# Patient Record
Sex: FEMALE | Race: OTHER | HISPANIC OR LATINO | Employment: OTHER | URBAN - METROPOLITAN AREA
[De-identification: names, ages, dates, MRNs, and addresses within clinical notes are randomized per-mention and may not be internally consistent; named-entity substitution may affect disease eponyms.]

---

## 2021-01-01 ENCOUNTER — APPOINTMENT (INPATIENT)
Dept: DIALYSIS | Facility: HOSPITAL | Age: 71
DRG: 637 | End: 2021-01-01
Attending: INTERNAL MEDICINE
Payer: MEDICARE

## 2021-01-01 ENCOUNTER — TELEPHONE (OUTPATIENT)
Dept: OTHER | Facility: OTHER | Age: 71
End: 2021-01-01

## 2021-01-01 ENCOUNTER — APPOINTMENT (INPATIENT)
Dept: RADIOLOGY | Facility: HOSPITAL | Age: 71
DRG: 637 | End: 2021-01-01
Payer: MEDICARE

## 2021-01-01 ENCOUNTER — APPOINTMENT (INPATIENT)
Dept: CT IMAGING | Facility: HOSPITAL | Age: 71
DRG: 637 | End: 2021-01-01
Payer: MEDICARE

## 2021-01-01 ENCOUNTER — APPOINTMENT (INPATIENT)
Dept: DIALYSIS | Facility: HOSPITAL | Age: 71
DRG: 637 | End: 2021-01-01
Payer: MEDICARE

## 2021-01-01 ENCOUNTER — TELEPHONE (OUTPATIENT)
Dept: NEUROLOGY | Facility: CLINIC | Age: 71
End: 2021-01-01

## 2021-01-01 ENCOUNTER — NURSING HOME VISIT (OUTPATIENT)
Dept: GERIATRICS | Facility: OTHER | Age: 71
End: 2021-01-01
Payer: MEDICARE

## 2021-01-01 ENCOUNTER — HOSPITAL ENCOUNTER (INPATIENT)
Facility: HOSPITAL | Age: 71
LOS: 18 days | Discharge: NURSING, REHAB OR LONG-TERM CARE | DRG: 637 | End: 2021-12-22
Attending: INTERNAL MEDICINE | Admitting: INTERNAL MEDICINE
Payer: MEDICARE

## 2021-01-01 ENCOUNTER — PREP FOR PROCEDURE (OUTPATIENT)
Dept: INTERVENTIONAL RADIOLOGY/VASCULAR | Facility: CLINIC | Age: 71
End: 2021-01-01

## 2021-01-01 ENCOUNTER — HOSPITAL ENCOUNTER (INPATIENT)
Dept: VASCULAR ULTRASOUND | Facility: HOSPITAL | Age: 71
Discharge: HOME | DRG: 637 | End: 2021-12-08
Payer: MEDICARE

## 2021-01-01 ENCOUNTER — TELEPHONE (OUTPATIENT)
Dept: CT IMAGING | Facility: HOSPITAL | Age: 71
End: 2021-01-01

## 2021-01-01 ENCOUNTER — APPOINTMENT (EMERGENCY)
Dept: CT IMAGING | Facility: HOSPITAL | Age: 71
End: 2021-01-01
Payer: MEDICARE

## 2021-01-01 ENCOUNTER — APPOINTMENT (INPATIENT)
Dept: NEUROLOGY | Facility: HOSPITAL | Age: 71
DRG: 637 | End: 2021-01-01
Payer: MEDICARE

## 2021-01-01 ENCOUNTER — APPOINTMENT (INPATIENT)
Dept: NON INVASIVE DIAGNOSTICS | Facility: HOSPITAL | Age: 71
DRG: 637 | End: 2021-01-01
Payer: MEDICARE

## 2021-01-01 ENCOUNTER — HOSPITAL ENCOUNTER (EMERGENCY)
Facility: HOSPITAL | Age: 71
End: 2021-12-04
Attending: EMERGENCY MEDICINE | Admitting: EMERGENCY MEDICINE
Payer: MEDICARE

## 2021-01-01 ENCOUNTER — APPOINTMENT (INPATIENT)
Dept: RADIOLOGY | Facility: HOSPITAL | Age: 71
DRG: 637 | End: 2021-01-01
Attending: RADIOLOGY
Payer: MEDICARE

## 2021-01-01 VITALS
BODY MASS INDEX: 20.43 KG/M2 | SYSTOLIC BLOOD PRESSURE: 145 MMHG | DIASTOLIC BLOOD PRESSURE: 68 MMHG | OXYGEN SATURATION: 92 % | TEMPERATURE: 98.4 F | WEIGHT: 119 LBS | RESPIRATION RATE: 18 BRPM | HEART RATE: 70 BPM

## 2021-01-01 VITALS
HEART RATE: 78 BPM | DIASTOLIC BLOOD PRESSURE: 58 MMHG | SYSTOLIC BLOOD PRESSURE: 103 MMHG | HEIGHT: 64 IN | RESPIRATION RATE: 18 BRPM | BODY MASS INDEX: 20.32 KG/M2 | TEMPERATURE: 97.8 F | WEIGHT: 119.05 LBS | OXYGEN SATURATION: 97 %

## 2021-01-01 VITALS
WEIGHT: 130.73 LBS | RESPIRATION RATE: 18 BRPM | OXYGEN SATURATION: 99 % | SYSTOLIC BLOOD PRESSURE: 142 MMHG | TEMPERATURE: 97.8 F | HEART RATE: 94 BPM | DIASTOLIC BLOOD PRESSURE: 62 MMHG

## 2021-01-01 DIAGNOSIS — Z99.2 ESRD (END STAGE RENAL DISEASE) ON DIALYSIS (HCC): Primary | ICD-10-CM

## 2021-01-01 DIAGNOSIS — N18.6 ESRD (END STAGE RENAL DISEASE) ON DIALYSIS (HCC): ICD-10-CM

## 2021-01-01 DIAGNOSIS — R93.89 ABNORMAL CT OF THE CHEST: ICD-10-CM

## 2021-01-01 DIAGNOSIS — E11.00 TYPE 2 DIABETES MELLITUS WITH HYPEROSMOLARITY WITHOUT COMA, WITH LONG-TERM CURRENT USE OF INSULIN (HCC): ICD-10-CM

## 2021-01-01 DIAGNOSIS — R13.10 DYSPHAGIA, UNSPECIFIED TYPE: ICD-10-CM

## 2021-01-01 DIAGNOSIS — Z79.4 TYPE 2 DIABETES MELLITUS WITH HYPEROSMOLARITY WITHOUT COMA, WITH LONG-TERM CURRENT USE OF INSULIN (HCC): ICD-10-CM

## 2021-01-01 DIAGNOSIS — R29.90 STROKE-LIKE SYMPTOMS: ICD-10-CM

## 2021-01-01 DIAGNOSIS — I25.10 CAD (CORONARY ARTERY DISEASE): ICD-10-CM

## 2021-01-01 DIAGNOSIS — K21.9 GASTROESOPHAGEAL REFLUX DISEASE WITHOUT ESOPHAGITIS: ICD-10-CM

## 2021-01-01 DIAGNOSIS — R26.2 AMBULATORY DYSFUNCTION: ICD-10-CM

## 2021-01-01 DIAGNOSIS — G93.40 ENCEPHALOPATHY: ICD-10-CM

## 2021-01-01 DIAGNOSIS — R41.82 ALTERED MENTAL STATUS: Primary | ICD-10-CM

## 2021-01-01 DIAGNOSIS — E87.6 HYPOKALEMIA: ICD-10-CM

## 2021-01-01 DIAGNOSIS — N18.6 ESRD (END STAGE RENAL DISEASE) ON DIALYSIS (HCC): Primary | ICD-10-CM

## 2021-01-01 DIAGNOSIS — I25.5 ISCHEMIC CARDIOMYOPATHY: ICD-10-CM

## 2021-01-01 DIAGNOSIS — R13.10 DYSPHAGIA, UNSPECIFIED TYPE: Primary | ICD-10-CM

## 2021-01-01 DIAGNOSIS — I63.9 CEREBELLAR STROKE (HCC): ICD-10-CM

## 2021-01-01 DIAGNOSIS — R77.8 TROPONIN LEVEL ELEVATED: ICD-10-CM

## 2021-01-01 DIAGNOSIS — Z99.2 ESRD ON PERITONEAL DIALYSIS (HCC): Primary | ICD-10-CM

## 2021-01-01 DIAGNOSIS — Z99.2 ESRD (END STAGE RENAL DISEASE) ON DIALYSIS (HCC): ICD-10-CM

## 2021-01-01 DIAGNOSIS — I63.9 CVA (CEREBRAL VASCULAR ACCIDENT) (HCC): ICD-10-CM

## 2021-01-01 DIAGNOSIS — E11.01 HYPEROSMOLAR COMA (HCC): ICD-10-CM

## 2021-01-01 DIAGNOSIS — N18.6 ESRD ON PERITONEAL DIALYSIS (HCC): Primary | ICD-10-CM

## 2021-01-01 DIAGNOSIS — I10 PRIMARY HYPERTENSION: ICD-10-CM

## 2021-01-01 DIAGNOSIS — R77.8 ELEVATED TROPONIN: ICD-10-CM

## 2021-01-01 LAB
2HR DELTA HS TROPONIN: 49 NG/L
2HR DELTA HS TROPONIN: 85 NG/L
4HR DELTA HS TROPONIN: 118 NG/L
4HR DELTA HS TROPONIN: 131 NG/L
ALBUMIN SERPL BCP-MCNC: 1.7 G/DL (ref 3.5–5)
ALBUMIN SERPL BCP-MCNC: 1.8 G/DL (ref 3.5–5)
ALBUMIN SERPL BCP-MCNC: 1.8 G/DL (ref 3.5–5)
ALBUMIN SERPL BCP-MCNC: 2.6 G/DL (ref 3.4–4.8)
ALP SERPL-CCNC: 108 U/L (ref 46–116)
ALP SERPL-CCNC: 64 U/L (ref 35–140)
ALP SERPL-CCNC: 79 U/L (ref 46–116)
ALP SERPL-CCNC: 80 U/L (ref 46–116)
ALT SERPL W P-5'-P-CCNC: 43 U/L (ref 5–54)
ALT SERPL W P-5'-P-CCNC: 48 U/L (ref 12–78)
ALT SERPL W P-5'-P-CCNC: 50 U/L (ref 12–78)
ALT SERPL W P-5'-P-CCNC: 63 U/L (ref 12–78)
AMMONIA PLAS-SCNC: 20 UMOL/L (ref 11–35)
ANION GAP SERPL CALCULATED.3IONS-SCNC: 10 MMOL/L (ref 4–13)
ANION GAP SERPL CALCULATED.3IONS-SCNC: 11 MMOL/L (ref 4–13)
ANION GAP SERPL CALCULATED.3IONS-SCNC: 12 MMOL/L (ref 4–13)
ANION GAP SERPL CALCULATED.3IONS-SCNC: 13 MMOL/L (ref 4–13)
ANION GAP SERPL CALCULATED.3IONS-SCNC: 13 MMOL/L (ref 4–13)
ANION GAP SERPL CALCULATED.3IONS-SCNC: 14 MMOL/L (ref 4–13)
ANION GAP SERPL CALCULATED.3IONS-SCNC: 6 MMOL/L (ref 4–13)
ANION GAP SERPL CALCULATED.3IONS-SCNC: 6 MMOL/L (ref 4–13)
ANION GAP SERPL CALCULATED.3IONS-SCNC: 7 MMOL/L (ref 4–13)
ANION GAP SERPL CALCULATED.3IONS-SCNC: 7 MMOL/L (ref 4–13)
ANION GAP SERPL CALCULATED.3IONS-SCNC: 8 MMOL/L (ref 4–13)
ANION GAP SERPL CALCULATED.3IONS-SCNC: 9 MMOL/L (ref 4–13)
AORTIC ROOT: 2.8 CM
APICAL FOUR CHAMBER EJECTION FRACTION: 41 %
APPEARANCE FLD: CLEAR
APTT PPP: 18 SECONDS (ref 23–37)
APTT PPP: 28 SECONDS (ref 23–37)
ARTERIAL PATENCY WRIST A: YES
ARTERIAL PATENCY WRIST A: YES
ASCENDING AORTA: 3.1 CM
AST SERPL W P-5'-P-CCNC: 40 U/L (ref 5–45)
AST SERPL W P-5'-P-CCNC: 44 U/L (ref 5–45)
AST SERPL W P-5'-P-CCNC: 50 U/L (ref 15–41)
AST SERPL W P-5'-P-CCNC: 66 U/L (ref 5–45)
ATRIAL RATE: 100 BPM
ATRIAL RATE: 107 BPM
ATRIAL RATE: 110 BPM
ATRIAL RATE: 21 BPM
ATRIAL RATE: 83 BPM
ATRIAL RATE: 89 BPM
ATRIAL RATE: 90 BPM
ATRIAL RATE: 91 BPM
ATRIAL RATE: 95 BPM
AV LVOT PEAK GRADIENT: 1 MMHG
AV PEAK GRADIENT: 10 MMHG
AV REGURGITATION PRESSURE HALF TIME: 0.35 MS
BACTERIA SPEC BFLD CULT: NO GROWTH
BASE EX.OXY STD BLDV CALC-SCNC: 78 % (ref 60–80)
BASE EX.OXY STD BLDV CALC-SCNC: 80.4 % (ref 60–80)
BASE EXCESS BLDA CALC-SCNC: 1.4 MMOL/L
BASE EXCESS BLDA CALC-SCNC: 2 MMOL/L (ref -2–3)
BASE EXCESS BLDV CALC-SCNC: -0.5 MMOL/L
BASE EXCESS BLDV CALC-SCNC: 0.6 MMOL/L
BASOPHILS # BLD AUTO: 0.01 THOUSANDS/ΜL (ref 0–0.1)
BASOPHILS # BLD AUTO: 0.01 THOUSANDS/ΜL (ref 0–0.1)
BASOPHILS # BLD AUTO: 0.02 THOUSANDS/ΜL (ref 0–0.1)
BASOPHILS # BLD AUTO: 0.03 THOUSANDS/ΜL (ref 0–0.1)
BASOPHILS # BLD MANUAL: 0 THOUSAND/UL (ref 0–0.1)
BASOPHILS NFR BLD AUTO: 0 % (ref 0–1)
BASOPHILS NFR BLD AUTO: 1 % (ref 0–1)
BASOPHILS NFR MAR MANUAL: 0 % (ref 0–1)
BETA-HYDROXYBUTYRATE: 2.3 MMOL/L
BILIRUB DIRECT SERPL-MCNC: 0.22 MG/DL (ref 0–0.2)
BILIRUB SERPL-MCNC: 0.37 MG/DL (ref 0.2–1)
BILIRUB SERPL-MCNC: 0.41 MG/DL (ref 0.2–1)
BILIRUB SERPL-MCNC: 0.41 MG/DL (ref 0.2–1)
BILIRUB SERPL-MCNC: 0.49 MG/DL (ref 0.3–1.2)
BUN SERPL-MCNC: 17 MG/DL (ref 5–25)
BUN SERPL-MCNC: 18 MG/DL (ref 5–25)
BUN SERPL-MCNC: 24 MG/DL (ref 5–25)
BUN SERPL-MCNC: 27 MG/DL (ref 5–25)
BUN SERPL-MCNC: 28 MG/DL (ref 5–25)
BUN SERPL-MCNC: 28 MG/DL (ref 5–25)
BUN SERPL-MCNC: 29 MG/DL (ref 5–25)
BUN SERPL-MCNC: 30 MG/DL (ref 5–25)
BUN SERPL-MCNC: 30 MG/DL (ref 5–25)
BUN SERPL-MCNC: 32 MG/DL (ref 5–25)
BUN SERPL-MCNC: 35 MG/DL (ref 5–25)
BUN SERPL-MCNC: 36 MG/DL (ref 5–25)
BUN SERPL-MCNC: 36 MG/DL (ref 6–20)
BUN SERPL-MCNC: 38 MG/DL (ref 5–25)
BUN SERPL-MCNC: 38 MG/DL (ref 5–25)
BUN SERPL-MCNC: 38 MG/DL (ref 6–20)
BUN SERPL-MCNC: 38 MG/DL (ref 6–20)
BUN SERPL-MCNC: 39 MG/DL (ref 5–25)
BUN SERPL-MCNC: 40 MG/DL (ref 6–20)
BUN SERPL-MCNC: 42 MG/DL (ref 5–25)
CA-I BLD-SCNC: 0.96 MMOL/L (ref 1.12–1.32)
CA-I BLD-SCNC: 1.02 MMOL/L (ref 1.12–1.32)
CA-I BLD-SCNC: 1.07 MMOL/L (ref 1.12–1.32)
CA-I BLD-SCNC: 1.13 MMOL/L (ref 1.12–1.32)
CA-I BLD-SCNC: 1.14 MMOL/L (ref 1.12–1.32)
CA-I BLD-SCNC: 1.15 MMOL/L (ref 1.12–1.32)
CA-I BLD-SCNC: 1.19 MMOL/L (ref 1.12–1.32)
CA-I BLD-SCNC: 1.19 MMOL/L (ref 1.12–1.32)
CALCIUM ALBUM COR SERPL-MCNC: 10 MG/DL (ref 8.3–10.1)
CALCIUM ALBUM COR SERPL-MCNC: 10.1 MG/DL (ref 8.3–10.1)
CALCIUM ALBUM COR SERPL-MCNC: 10.5 MG/DL (ref 8.3–10.1)
CALCIUM SERPL-MCNC: 7.6 MG/DL (ref 8.3–10.1)
CALCIUM SERPL-MCNC: 7.6 MG/DL (ref 8.3–10.1)
CALCIUM SERPL-MCNC: 8 MG/DL (ref 8.4–10.2)
CALCIUM SERPL-MCNC: 8.1 MG/DL (ref 8.3–10.1)
CALCIUM SERPL-MCNC: 8.3 MG/DL (ref 8.3–10.1)
CALCIUM SERPL-MCNC: 8.4 MG/DL (ref 8.3–10.1)
CALCIUM SERPL-MCNC: 8.4 MG/DL (ref 8.3–10.1)
CALCIUM SERPL-MCNC: 8.5 MG/DL (ref 8.3–10.1)
CALCIUM SERPL-MCNC: 8.6 MG/DL (ref 8.3–10.1)
CALCIUM SERPL-MCNC: 8.7 MG/DL (ref 8.3–10.1)
CALCIUM SERPL-MCNC: 8.7 MG/DL (ref 8.4–10.2)
CALCIUM SERPL-MCNC: 8.8 MG/DL (ref 8.3–10.1)
CALCIUM SERPL-MCNC: 8.8 MG/DL (ref 8.3–10.1)
CALCIUM SERPL-MCNC: 8.9 MG/DL (ref 8.3–10.1)
CALCIUM SERPL-MCNC: 8.9 MG/DL (ref 8.4–10.2)
CALCIUM SERPL-MCNC: 8.9 MG/DL (ref 8.4–10.2)
CALCIUM SERPL-MCNC: 9 MG/DL (ref 8.3–10.1)
CALCIUM SERPL-MCNC: 9 MG/DL (ref 8.3–10.1)
CALCIUM SERPL-MCNC: 9.1 MG/DL (ref 8.3–10.1)
CALCIUM SERPL-MCNC: 9.2 MG/DL (ref 8.3–10.1)
CARDIAC TROPONIN I PNL SERPL HS: 297 NG/L
CARDIAC TROPONIN I PNL SERPL HS: 382 NG/L
CARDIAC TROPONIN I PNL SERPL HS: 428 NG/L
CARDIAC TROPONIN I PNL SERPL HS: 795 NG/L
CARDIAC TROPONIN I PNL SERPL HS: 844 NG/L
CARDIAC TROPONIN I PNL SERPL HS: 913 NG/L
CHLORIDE SERPL-SCNC: 102 MMOL/L (ref 100–108)
CHLORIDE SERPL-SCNC: 102 MMOL/L (ref 100–108)
CHLORIDE SERPL-SCNC: 102 MMOL/L (ref 96–108)
CHLORIDE SERPL-SCNC: 103 MMOL/L (ref 100–108)
CHLORIDE SERPL-SCNC: 104 MMOL/L (ref 100–108)
CHLORIDE SERPL-SCNC: 105 MMOL/L (ref 100–108)
CHLORIDE SERPL-SCNC: 105 MMOL/L (ref 96–108)
CHLORIDE SERPL-SCNC: 105 MMOL/L (ref 96–108)
CHLORIDE SERPL-SCNC: 106 MMOL/L (ref 100–108)
CHLORIDE SERPL-SCNC: 107 MMOL/L (ref 100–108)
CHLORIDE SERPL-SCNC: 109 MMOL/L (ref 100–108)
CHLORIDE SERPL-SCNC: 110 MMOL/L (ref 100–108)
CHLORIDE SERPL-SCNC: 96 MMOL/L (ref 96–108)
CHLORIDE SERPL-SCNC: 99 MMOL/L (ref 100–108)
CHOLEST SERPL-MCNC: 56 MG/DL
CO2 SERPL-SCNC: 20 MMOL/L (ref 21–32)
CO2 SERPL-SCNC: 23 MMOL/L (ref 21–32)
CO2 SERPL-SCNC: 24 MMOL/L (ref 21–32)
CO2 SERPL-SCNC: 24 MMOL/L (ref 22–33)
CO2 SERPL-SCNC: 25 MMOL/L (ref 21–32)
CO2 SERPL-SCNC: 26 MMOL/L (ref 21–32)
CO2 SERPL-SCNC: 26 MMOL/L (ref 22–33)
CO2 SERPL-SCNC: 27 MMOL/L (ref 21–32)
CO2 SERPL-SCNC: 28 MMOL/L (ref 21–32)
CO2 SERPL-SCNC: 28 MMOL/L (ref 22–33)
CO2 SERPL-SCNC: 29 MMOL/L (ref 22–33)
COLOR FLD: COLORLESS
CREAT SERPL-MCNC: 2.03 MG/DL (ref 0.6–1.3)
CREAT SERPL-MCNC: 2.2 MG/DL (ref 0.6–1.3)
CREAT SERPL-MCNC: 2.65 MG/DL (ref 0.6–1.3)
CREAT SERPL-MCNC: 2.87 MG/DL (ref 0.6–1.3)
CREAT SERPL-MCNC: 2.93 MG/DL (ref 0.6–1.3)
CREAT SERPL-MCNC: 3.04 MG/DL (ref 0.6–1.3)
CREAT SERPL-MCNC: 3.23 MG/DL (ref 0.6–1.3)
CREAT SERPL-MCNC: 3.36 MG/DL (ref 0.6–1.3)
CREAT SERPL-MCNC: 3.44 MG/DL (ref 0.6–1.3)
CREAT SERPL-MCNC: 3.49 MG/DL (ref 0.6–1.3)
CREAT SERPL-MCNC: 3.5 MG/DL (ref 0.6–1.3)
CREAT SERPL-MCNC: 3.58 MG/DL (ref 0.6–1.3)
CREAT SERPL-MCNC: 3.61 MG/DL (ref 0.6–1.3)
CREAT SERPL-MCNC: 3.62 MG/DL (ref 0.6–1.3)
CREAT SERPL-MCNC: 3.76 MG/DL (ref 0.6–1.3)
CREAT SERPL-MCNC: 3.79 MG/DL (ref 0.6–1.3)
CREAT SERPL-MCNC: 3.89 MG/DL (ref 0.6–1.3)
CREAT SERPL-MCNC: 3.96 MG/DL (ref 0.6–1.3)
CREAT SERPL-MCNC: 3.98 MG/DL (ref 0.6–1.3)
CREAT SERPL-MCNC: 4.02 MG/DL (ref 0.4–1.1)
CREAT SERPL-MCNC: 4.14 MG/DL (ref 0.4–1.1)
CREAT SERPL-MCNC: 4.18 MG/DL (ref 0.6–1.3)
CREAT SERPL-MCNC: 4.19 MG/DL (ref 0.4–1.1)
CREAT SERPL-MCNC: 4.27 MG/DL (ref 0.6–1.3)
CREAT SERPL-MCNC: 4.36 MG/DL (ref 0.4–1.1)
DOP CALC LVOT AREA: 2.54 CM2
DOP CALC LVOT DIAMETER: 1.8 CM
DOP CALC MV VTI: 24.39 CM
DOP CALC RVOT PEAK VEL: 0.7 M/S
E WAVE DECELERATION TIME: 126 MS
EOSINOPHIL # BLD AUTO: 0.02 THOUSAND/ΜL (ref 0–0.61)
EOSINOPHIL # BLD AUTO: 0.03 THOUSAND/ΜL (ref 0–0.61)
EOSINOPHIL # BLD AUTO: 0.04 THOUSAND/ΜL (ref 0–0.61)
EOSINOPHIL # BLD AUTO: 0.04 THOUSAND/ΜL (ref 0–0.61)
EOSINOPHIL # BLD AUTO: 0.05 THOUSAND/ΜL (ref 0–0.61)
EOSINOPHIL # BLD AUTO: 0.06 THOUSAND/ΜL (ref 0–0.61)
EOSINOPHIL # BLD AUTO: 0.08 THOUSAND/ΜL (ref 0–0.61)
EOSINOPHIL # BLD AUTO: 0.16 THOUSAND/ΜL (ref 0–0.61)
EOSINOPHIL # BLD MANUAL: 0.1 THOUSAND/UL (ref 0–0.4)
EOSINOPHIL NFR BLD AUTO: 0 % (ref 0–6)
EOSINOPHIL NFR BLD AUTO: 1 % (ref 0–6)
EOSINOPHIL NFR BLD AUTO: 3 % (ref 0–6)
EOSINOPHIL NFR BLD MANUAL: 2 % (ref 0–6)
ERYTHROCYTE [DISTWIDTH] IN BLOOD BY AUTOMATED COUNT: 14.5 % (ref 11.6–15.1)
ERYTHROCYTE [DISTWIDTH] IN BLOOD BY AUTOMATED COUNT: 14.6 % (ref 11.6–15.1)
ERYTHROCYTE [DISTWIDTH] IN BLOOD BY AUTOMATED COUNT: 14.9 % (ref 11.6–15.1)
ERYTHROCYTE [DISTWIDTH] IN BLOOD BY AUTOMATED COUNT: 14.9 % (ref 11.6–15.1)
ERYTHROCYTE [DISTWIDTH] IN BLOOD BY AUTOMATED COUNT: 15.2 % (ref 11.6–15.1)
ERYTHROCYTE [DISTWIDTH] IN BLOOD BY AUTOMATED COUNT: 15.3 % (ref 11.6–15.1)
ERYTHROCYTE [DISTWIDTH] IN BLOOD BY AUTOMATED COUNT: 15.5 % (ref 11.6–15.1)
EST. AVERAGE GLUCOSE BLD GHB EST-MCNC: 249 MG/DL
FIO2 GAS DIL.REBREATH: 50 L
FLUAV RNA RESP QL NAA+PROBE: NEGATIVE
FLUBV RNA RESP QL NAA+PROBE: NEGATIVE
FRACTIONAL SHORTENING: 29 % (ref 28–44)
GFR SERPL CREATININE-BSD FRML MDRD: 10 ML/MIN/1.73SQ M
GFR SERPL CREATININE-BSD FRML MDRD: 11 ML/MIN/1.73SQ M
GFR SERPL CREATININE-BSD FRML MDRD: 12 ML/MIN/1.73SQ M
GFR SERPL CREATININE-BSD FRML MDRD: 13 ML/MIN/1.73SQ M
GFR SERPL CREATININE-BSD FRML MDRD: 14 ML/MIN/1.73SQ M
GFR SERPL CREATININE-BSD FRML MDRD: 15 ML/MIN/1.73SQ M
GFR SERPL CREATININE-BSD FRML MDRD: 15 ML/MIN/1.73SQ M
GFR SERPL CREATININE-BSD FRML MDRD: 16 ML/MIN/1.73SQ M
GFR SERPL CREATININE-BSD FRML MDRD: 17 ML/MIN/1.73SQ M
GFR SERPL CREATININE-BSD FRML MDRD: 22 ML/MIN/1.73SQ M
GFR SERPL CREATININE-BSD FRML MDRD: 24 ML/MIN/1.73SQ M
GLUCOSE SERPL-MCNC: 101 MG/DL (ref 65–140)
GLUCOSE SERPL-MCNC: 102 MG/DL (ref 65–140)
GLUCOSE SERPL-MCNC: 103 MG/DL (ref 65–140)
GLUCOSE SERPL-MCNC: 104 MG/DL (ref 65–140)
GLUCOSE SERPL-MCNC: 105 MG/DL (ref 65–140)
GLUCOSE SERPL-MCNC: 105 MG/DL (ref 65–140)
GLUCOSE SERPL-MCNC: 106 MG/DL (ref 65–140)
GLUCOSE SERPL-MCNC: 107 MG/DL (ref 65–140)
GLUCOSE SERPL-MCNC: 111 MG/DL (ref 65–140)
GLUCOSE SERPL-MCNC: 112 MG/DL (ref 65–140)
GLUCOSE SERPL-MCNC: 113 MG/DL (ref 65–140)
GLUCOSE SERPL-MCNC: 113 MG/DL (ref 65–140)
GLUCOSE SERPL-MCNC: 114 MG/DL (ref 65–140)
GLUCOSE SERPL-MCNC: 115 MG/DL (ref 65–140)
GLUCOSE SERPL-MCNC: 116 MG/DL (ref 65–140)
GLUCOSE SERPL-MCNC: 118 MG/DL (ref 65–140)
GLUCOSE SERPL-MCNC: 118 MG/DL (ref 65–140)
GLUCOSE SERPL-MCNC: 121 MG/DL (ref 65–140)
GLUCOSE SERPL-MCNC: 123 MG/DL (ref 65–140)
GLUCOSE SERPL-MCNC: 124 MG/DL (ref 65–140)
GLUCOSE SERPL-MCNC: 124 MG/DL (ref 65–140)
GLUCOSE SERPL-MCNC: 125 MG/DL (ref 65–140)
GLUCOSE SERPL-MCNC: 125 MG/DL (ref 65–140)
GLUCOSE SERPL-MCNC: 126 MG/DL (ref 65–140)
GLUCOSE SERPL-MCNC: 128 MG/DL (ref 65–140)
GLUCOSE SERPL-MCNC: 129 MG/DL (ref 65–140)
GLUCOSE SERPL-MCNC: 132 MG/DL (ref 65–140)
GLUCOSE SERPL-MCNC: 132 MG/DL (ref 65–140)
GLUCOSE SERPL-MCNC: 134 MG/DL (ref 65–140)
GLUCOSE SERPL-MCNC: 134 MG/DL (ref 65–140)
GLUCOSE SERPL-MCNC: 135 MG/DL (ref 65–140)
GLUCOSE SERPL-MCNC: 136 MG/DL (ref 65–140)
GLUCOSE SERPL-MCNC: 137 MG/DL (ref 65–140)
GLUCOSE SERPL-MCNC: 139 MG/DL (ref 65–140)
GLUCOSE SERPL-MCNC: 140 MG/DL (ref 65–140)
GLUCOSE SERPL-MCNC: 142 MG/DL (ref 65–140)
GLUCOSE SERPL-MCNC: 143 MG/DL (ref 65–140)
GLUCOSE SERPL-MCNC: 144 MG/DL (ref 65–140)
GLUCOSE SERPL-MCNC: 144 MG/DL (ref 65–140)
GLUCOSE SERPL-MCNC: 146 MG/DL (ref 65–140)
GLUCOSE SERPL-MCNC: 148 MG/DL (ref 65–140)
GLUCOSE SERPL-MCNC: 152 MG/DL (ref 65–140)
GLUCOSE SERPL-MCNC: 153 MG/DL (ref 65–140)
GLUCOSE SERPL-MCNC: 154 MG/DL (ref 65–140)
GLUCOSE SERPL-MCNC: 156 MG/DL (ref 65–140)
GLUCOSE SERPL-MCNC: 159 MG/DL (ref 65–140)
GLUCOSE SERPL-MCNC: 159 MG/DL (ref 65–140)
GLUCOSE SERPL-MCNC: 160 MG/DL (ref 65–140)
GLUCOSE SERPL-MCNC: 160 MG/DL (ref 65–140)
GLUCOSE SERPL-MCNC: 163 MG/DL (ref 65–140)
GLUCOSE SERPL-MCNC: 164 MG/DL (ref 65–140)
GLUCOSE SERPL-MCNC: 166 MG/DL (ref 65–140)
GLUCOSE SERPL-MCNC: 168 MG/DL (ref 65–140)
GLUCOSE SERPL-MCNC: 169 MG/DL (ref 65–140)
GLUCOSE SERPL-MCNC: 170 MG/DL (ref 65–140)
GLUCOSE SERPL-MCNC: 172 MG/DL (ref 65–140)
GLUCOSE SERPL-MCNC: 177 MG/DL (ref 65–140)
GLUCOSE SERPL-MCNC: 180 MG/DL (ref 65–140)
GLUCOSE SERPL-MCNC: 187 MG/DL (ref 65–140)
GLUCOSE SERPL-MCNC: 188 MG/DL (ref 65–140)
GLUCOSE SERPL-MCNC: 189 MG/DL (ref 65–140)
GLUCOSE SERPL-MCNC: 190 MG/DL (ref 65–140)
GLUCOSE SERPL-MCNC: 194 MG/DL (ref 65–140)
GLUCOSE SERPL-MCNC: 194 MG/DL (ref 65–140)
GLUCOSE SERPL-MCNC: 197 MG/DL (ref 65–140)
GLUCOSE SERPL-MCNC: 202 MG/DL (ref 65–140)
GLUCOSE SERPL-MCNC: 205 MG/DL (ref 65–140)
GLUCOSE SERPL-MCNC: 208 MG/DL (ref 65–140)
GLUCOSE SERPL-MCNC: 214 MG/DL (ref 65–140)
GLUCOSE SERPL-MCNC: 216 MG/DL (ref 65–140)
GLUCOSE SERPL-MCNC: 219 MG/DL (ref 65–140)
GLUCOSE SERPL-MCNC: 224 MG/DL (ref 65–140)
GLUCOSE SERPL-MCNC: 225 MG/DL (ref 65–140)
GLUCOSE SERPL-MCNC: 229 MG/DL (ref 65–140)
GLUCOSE SERPL-MCNC: 231 MG/DL (ref 65–140)
GLUCOSE SERPL-MCNC: 231 MG/DL (ref 65–140)
GLUCOSE SERPL-MCNC: 233 MG/DL (ref 65–140)
GLUCOSE SERPL-MCNC: 235 MG/DL (ref 65–140)
GLUCOSE SERPL-MCNC: 241 MG/DL (ref 65–140)
GLUCOSE SERPL-MCNC: 242 MG/DL (ref 65–140)
GLUCOSE SERPL-MCNC: 249 MG/DL (ref 65–140)
GLUCOSE SERPL-MCNC: 251 MG/DL (ref 65–140)
GLUCOSE SERPL-MCNC: 281 MG/DL (ref 65–140)
GLUCOSE SERPL-MCNC: 293 MG/DL (ref 65–140)
GLUCOSE SERPL-MCNC: 296 MG/DL (ref 65–140)
GLUCOSE SERPL-MCNC: 301 MG/DL (ref 65–140)
GLUCOSE SERPL-MCNC: 330 MG/DL (ref 65–140)
GLUCOSE SERPL-MCNC: 360 MG/DL (ref 65–140)
GLUCOSE SERPL-MCNC: 384 MG/DL (ref 65–140)
GLUCOSE SERPL-MCNC: 398 MG/DL (ref 65–140)
GLUCOSE SERPL-MCNC: 404 MG/DL (ref 65–140)
GLUCOSE SERPL-MCNC: 405 MG/DL (ref 65–140)
GLUCOSE SERPL-MCNC: 406 MG/DL (ref 65–140)
GLUCOSE SERPL-MCNC: 413 MG/DL (ref 65–140)
GLUCOSE SERPL-MCNC: 440 MG/DL (ref 65–140)
GLUCOSE SERPL-MCNC: 454 MG/DL (ref 65–140)
GLUCOSE SERPL-MCNC: 462 MG/DL (ref 65–140)
GLUCOSE SERPL-MCNC: 475 MG/DL (ref 65–140)
GLUCOSE SERPL-MCNC: 498 MG/DL (ref 65–140)
GLUCOSE SERPL-MCNC: 55 MG/DL (ref 65–140)
GLUCOSE SERPL-MCNC: 56 MG/DL (ref 65–140)
GLUCOSE SERPL-MCNC: 57 MG/DL (ref 65–140)
GLUCOSE SERPL-MCNC: 59 MG/DL (ref 65–140)
GLUCOSE SERPL-MCNC: 64 MG/DL (ref 65–140)
GLUCOSE SERPL-MCNC: 670 MG/DL (ref 65–140)
GLUCOSE SERPL-MCNC: 684 MG/DL (ref 65–140)
GLUCOSE SERPL-MCNC: 69 MG/DL (ref 65–140)
GLUCOSE SERPL-MCNC: 700 MG/DL (ref 65–140)
GLUCOSE SERPL-MCNC: 71 MG/DL (ref 65–140)
GLUCOSE SERPL-MCNC: 75 MG/DL (ref 65–140)
GLUCOSE SERPL-MCNC: 76 MG/DL (ref 65–140)
GLUCOSE SERPL-MCNC: 77 MG/DL (ref 65–140)
GLUCOSE SERPL-MCNC: 78 MG/DL (ref 65–140)
GLUCOSE SERPL-MCNC: 81 MG/DL (ref 65–140)
GLUCOSE SERPL-MCNC: 82 MG/DL (ref 65–140)
GLUCOSE SERPL-MCNC: 82 MG/DL (ref 65–140)
GLUCOSE SERPL-MCNC: 83 MG/DL (ref 65–140)
GLUCOSE SERPL-MCNC: 85 MG/DL (ref 65–140)
GLUCOSE SERPL-MCNC: 86 MG/DL (ref 65–140)
GLUCOSE SERPL-MCNC: 86 MG/DL (ref 65–140)
GLUCOSE SERPL-MCNC: 869 MG/DL (ref 65–140)
GLUCOSE SERPL-MCNC: 87 MG/DL (ref 65–140)
GLUCOSE SERPL-MCNC: 88 MG/DL (ref 65–140)
GLUCOSE SERPL-MCNC: 88 MG/DL (ref 65–140)
GLUCOSE SERPL-MCNC: 92 MG/DL (ref 65–140)
GLUCOSE SERPL-MCNC: 99 MG/DL (ref 65–140)
GLUCOSE SERPL-MCNC: 99 MG/DL (ref 65–140)
GLUCOSE SERPL-MCNC: 990 MG/DL (ref 65–140)
GLUCOSE SERPL-MCNC: <20 MG/DL (ref 65–140)
GLUCOSE SERPL-MCNC: <20 MG/DL (ref 65–140)
GLUCOSE SERPL-MCNC: >500 MG/DL (ref 65–140)
GRAM STN SPEC: NORMAL
HBA1C MFR BLD: 10.3 %
HBV CORE AB SER QL: NORMAL
HBV CORE IGM SER QL: NORMAL
HBV SURFACE AB SER-ACNC: 412.89 MIU/ML
HBV SURFACE AG SER QL: NORMAL
HCO3 BLDA-SCNC: 24.5 MMOL/L (ref 22–28)
HCO3 BLDA-SCNC: 26 MMOL/L (ref 22–28)
HCO3 BLDV-SCNC: 24.7 MMOL/L (ref 24–30)
HCO3 BLDV-SCNC: 26.2 MMOL/L (ref 24–30)
HCT VFR BLD AUTO: 30.1 % (ref 34.8–46.1)
HCT VFR BLD AUTO: 30.1 % (ref 34.8–46.1)
HCT VFR BLD AUTO: 30.3 % (ref 34.8–46.1)
HCT VFR BLD AUTO: 34.8 % (ref 34.8–46.1)
HCT VFR BLD AUTO: 35.3 % (ref 34.8–46.1)
HCT VFR BLD AUTO: 35.7 % (ref 34.8–46.1)
HCT VFR BLD AUTO: 37.4 % (ref 34.8–46.1)
HCT VFR BLD AUTO: 41 % (ref 34.8–46.1)
HCT VFR BLD AUTO: 42 % (ref 34.8–46.1)
HCT VFR BLD AUTO: 42.2 % (ref 34.8–46.1)
HCT VFR BLD AUTO: 46 % (ref 34.8–46.1)
HCT VFR BLD CALC: 30 % (ref 34.8–46.1)
HCV AB SER QL: NORMAL
HDLC SERPL-MCNC: 14 MG/DL
HGB BLD-MCNC: 10.4 G/DL (ref 11.5–15.4)
HGB BLD-MCNC: 10.7 G/DL (ref 11.5–15.4)
HGB BLD-MCNC: 10.9 G/DL (ref 11.5–15.4)
HGB BLD-MCNC: 11.8 G/DL (ref 11.5–15.4)
HGB BLD-MCNC: 12.5 G/DL (ref 11.5–15.4)
HGB BLD-MCNC: 12.9 G/DL (ref 11.5–15.4)
HGB BLD-MCNC: 13.5 G/DL (ref 11.5–15.4)
HGB BLD-MCNC: 13.5 G/DL (ref 11.5–15.4)
HGB BLD-MCNC: 9.2 G/DL (ref 11.5–15.4)
HGB BLD-MCNC: 9.4 G/DL (ref 11.5–15.4)
HGB BLD-MCNC: 9.5 G/DL (ref 11.5–15.4)
HGB BLDA-MCNC: 10.2 G/DL (ref 11.5–15.4)
IMM GRANULOCYTES # BLD AUTO: 0.02 THOUSAND/UL (ref 0–0.2)
IMM GRANULOCYTES # BLD AUTO: 0.03 THOUSAND/UL (ref 0–0.2)
IMM GRANULOCYTES # BLD AUTO: 0.04 THOUSAND/UL (ref 0–0.2)
IMM GRANULOCYTES # BLD AUTO: 0.07 THOUSAND/UL (ref 0–0.2)
IMM GRANULOCYTES # BLD AUTO: 0.09 THOUSAND/UL (ref 0–0.2)
IMM GRANULOCYTES # BLD AUTO: 0.09 THOUSAND/UL (ref 0–0.2)
IMM GRANULOCYTES # BLD AUTO: 0.11 THOUSAND/UL (ref 0–0.2)
IMM GRANULOCYTES # BLD AUTO: 0.12 THOUSAND/UL (ref 0–0.2)
IMM GRANULOCYTES NFR BLD AUTO: 0 % (ref 0–2)
IMM GRANULOCYTES NFR BLD AUTO: 0 % (ref 0–2)
IMM GRANULOCYTES NFR BLD AUTO: 1 % (ref 0–2)
IMM GRANULOCYTES NFR BLD AUTO: 2 % (ref 0–2)
IMM GRANULOCYTES NFR BLD AUTO: 2 % (ref 0–2)
INR PPP: 0.93 (ref 0.84–1.19)
INR PPP: 0.99 (ref 0.84–1.19)
INR PPP: 1.11 (ref 0.84–1.19)
INTERVENTRICULAR SEPTUM IN DIASTOLE (PARASTERNAL SHORT AXIS VIEW): 1.1 CM
LACTATE SERPL-SCNC: 1.5 MMOL/L (ref 0–2)
LACTATE SERPL-SCNC: 1.9 MMOL/L (ref 0.5–2)
LACTATE SERPL-SCNC: 2.1 MMOL/L (ref 0–2)
LDLC SERPL CALC-MCNC: 3 MG/DL (ref 0–100)
LEFT ATRIUM AREA SYSTOLE SINGLE PLANE A4C: 14.9 CM2
LEFT INTERNAL DIMENSION IN SYSTOLE: 2.9 CM (ref 2.1–4)
LEFT VENTRICULAR INTERNAL DIMENSION IN DIASTOLE: 4.1 CM (ref 3.76–5.59)
LEFT VENTRICULAR POSTERIOR WALL IN END DIASTOLE: 0.8 CM
LEFT VENTRICULAR STROKE VOLUME: 43 ML
LYMPHOCYTES # BLD AUTO: 0.88 THOUSANDS/ΜL (ref 0.6–4.47)
LYMPHOCYTES # BLD AUTO: 1.06 THOUSANDS/ΜL (ref 0.6–4.47)
LYMPHOCYTES # BLD AUTO: 1.18 THOUSANDS/ΜL (ref 0.6–4.47)
LYMPHOCYTES # BLD AUTO: 1.69 THOUSAND/UL (ref 0.6–4.47)
LYMPHOCYTES # BLD AUTO: 1.75 THOUSANDS/ΜL (ref 0.6–4.47)
LYMPHOCYTES # BLD AUTO: 1.87 THOUSANDS/ΜL (ref 0.6–4.47)
LYMPHOCYTES # BLD AUTO: 1.91 THOUSANDS/ΜL (ref 0.6–4.47)
LYMPHOCYTES # BLD AUTO: 1.94 THOUSANDS/ΜL (ref 0.6–4.47)
LYMPHOCYTES # BLD AUTO: 2.03 THOUSANDS/ΜL (ref 0.6–4.47)
LYMPHOCYTES # BLD AUTO: 35 % (ref 14–44)
LYMPHOCYTES NFR BLD AUTO: 13 % (ref 14–44)
LYMPHOCYTES NFR BLD AUTO: 16 % (ref 14–44)
LYMPHOCYTES NFR BLD AUTO: 17 % (ref 14–44)
LYMPHOCYTES NFR BLD AUTO: 27 % (ref 14–44)
LYMPHOCYTES NFR BLD AUTO: 31 % (ref 14–44)
LYMPHOCYTES NFR BLD AUTO: 34 % (ref 14–44)
LYMPHOCYTES NFR BLD AUTO: 34 % (ref 14–44)
LYMPHOCYTES NFR BLD AUTO: 35 % (ref 14–44)
MAGNESIUM SERPL-MCNC: 1.5 MG/DL (ref 1.6–2.6)
MAGNESIUM SERPL-MCNC: 1.6 MG/DL (ref 1.6–2.6)
MAGNESIUM SERPL-MCNC: 1.7 MG/DL (ref 1.6–2.6)
MAGNESIUM SERPL-MCNC: 1.8 MG/DL (ref 1.6–2.6)
MAGNESIUM SERPL-MCNC: 1.8 MG/DL (ref 1.6–2.6)
MAGNESIUM SERPL-MCNC: 1.9 MG/DL (ref 1.6–2.6)
MAGNESIUM SERPL-MCNC: 2.5 MG/DL (ref 1.6–2.6)
MAGNESIUM SERPL-MCNC: 2.7 MG/DL (ref 1.6–2.6)
MAGNESIUM SERPL-MCNC: 2.9 MG/DL (ref 1.6–2.6)
MAGNESIUM SERPL-MCNC: 3 MG/DL (ref 1.6–2.6)
MCH RBC QN AUTO: 28.3 PG (ref 26.8–34.3)
MCH RBC QN AUTO: 28.4 PG (ref 26.8–34.3)
MCH RBC QN AUTO: 28.7 PG (ref 26.8–34.3)
MCH RBC QN AUTO: 28.9 PG (ref 26.8–34.3)
MCH RBC QN AUTO: 29.2 PG (ref 26.8–34.3)
MCH RBC QN AUTO: 29.7 PG (ref 26.8–34.3)
MCH RBC QN AUTO: 29.7 PG (ref 26.8–34.3)
MCH RBC QN AUTO: 29.8 PG (ref 26.8–34.3)
MCH RBC QN AUTO: 29.9 PG (ref 26.8–34.3)
MCHC RBC AUTO-ENTMCNC: 29.3 G/DL (ref 31.4–37.4)
MCHC RBC AUTO-ENTMCNC: 29.5 G/DL (ref 31.4–37.4)
MCHC RBC AUTO-ENTMCNC: 30.4 G/DL (ref 31.4–37.4)
MCHC RBC AUTO-ENTMCNC: 30.5 G/DL (ref 31.4–37.4)
MCHC RBC AUTO-ENTMCNC: 30.5 G/DL (ref 31.4–37.4)
MCHC RBC AUTO-ENTMCNC: 30.7 G/DL (ref 31.4–37.4)
MCHC RBC AUTO-ENTMCNC: 30.7 G/DL (ref 31.4–37.4)
MCHC RBC AUTO-ENTMCNC: 31.2 G/DL (ref 31.4–37.4)
MCHC RBC AUTO-ENTMCNC: 31.6 G/DL (ref 31.4–37.4)
MCHC RBC AUTO-ENTMCNC: 31.6 G/DL (ref 31.4–37.4)
MCHC RBC AUTO-ENTMCNC: 32 G/DL (ref 31.4–37.4)
MCV RBC AUTO: 93 FL (ref 82–98)
MCV RBC AUTO: 94 FL (ref 82–98)
MCV RBC AUTO: 95 FL (ref 82–98)
MCV RBC AUTO: 96 FL (ref 82–98)
MCV RBC AUTO: 96 FL (ref 82–98)
MCV RBC AUTO: 97 FL (ref 82–98)
MITRAL REGURGITATION PEAK VELOCITY: 5.79 M/S
MITRAL VALVE MEAN INFLOW VELOCITY: 4.46 M/S
MITRAL VALVE REGURGITANT PEAK GRADIENT: 134 MMHG
MONOCYTES # BLD AUTO: 0.24 THOUSAND/UL (ref 0–1.22)
MONOCYTES # BLD AUTO: 0.28 THOUSAND/ΜL (ref 0.17–1.22)
MONOCYTES # BLD AUTO: 0.32 THOUSAND/ΜL (ref 0.17–1.22)
MONOCYTES # BLD AUTO: 0.35 THOUSAND/ΜL (ref 0.17–1.22)
MONOCYTES # BLD AUTO: 0.39 THOUSAND/ΜL (ref 0.17–1.22)
MONOCYTES # BLD AUTO: 0.43 THOUSAND/ΜL (ref 0.17–1.22)
MONOCYTES # BLD AUTO: 0.44 THOUSAND/ΜL (ref 0.17–1.22)
MONOCYTES # BLD AUTO: 0.45 THOUSAND/ΜL (ref 0.17–1.22)
MONOCYTES # BLD AUTO: 0.48 THOUSAND/ΜL (ref 0.17–1.22)
MONOCYTES NFR BLD AUTO: 4 % (ref 4–12)
MONOCYTES NFR BLD AUTO: 5 % (ref 4–12)
MONOCYTES NFR BLD AUTO: 5 % (ref 4–12)
MONOCYTES NFR BLD AUTO: 6 % (ref 4–12)
MONOCYTES NFR BLD AUTO: 7 % (ref 4–12)
MONOCYTES NFR BLD AUTO: 8 % (ref 4–12)
MONOCYTES NFR BLD AUTO: 8 % (ref 4–12)
MONOCYTES NFR BLD AUTO: 9 % (ref 4–12)
MONOCYTES NFR BLD: 5 % (ref 4–12)
MRSA NOSE QL CULT: NORMAL
MV E'TISSUE VEL-SEP: 5 CM/S
MV MEAN GRADIENT: 5 MMHG
MV PEAK A VEL: 0.22 M/S
MV PEAK E VEL: 172 CM/S
MV PEAK GRADIENT: 14 MMHG
MV STENOSIS PRESSURE HALF TIME: 0 MS
NEUTROPHILS # BLD AUTO: 3.03 THOUSANDS/ΜL (ref 1.85–7.62)
NEUTROPHILS # BLD AUTO: 3.11 THOUSANDS/ΜL (ref 1.85–7.62)
NEUTROPHILS # BLD AUTO: 3.24 THOUSANDS/ΜL (ref 1.85–7.62)
NEUTROPHILS # BLD AUTO: 3.38 THOUSANDS/ΜL (ref 1.85–7.62)
NEUTROPHILS # BLD AUTO: 4.48 THOUSANDS/ΜL (ref 1.85–7.62)
NEUTROPHILS # BLD AUTO: 4.96 THOUSANDS/ΜL (ref 1.85–7.62)
NEUTROPHILS # BLD AUTO: 5.43 THOUSANDS/ΜL (ref 1.85–7.62)
NEUTROPHILS # BLD AUTO: 5.46 THOUSANDS/ΜL (ref 1.85–7.62)
NEUTROPHILS # BLD MANUAL: 2.81 THOUSAND/UL (ref 1.85–7.62)
NEUTS SEG NFR BLD AUTO: 54 % (ref 43–75)
NEUTS SEG NFR BLD AUTO: 55 % (ref 43–75)
NEUTS SEG NFR BLD AUTO: 55 % (ref 43–75)
NEUTS SEG NFR BLD AUTO: 58 % (ref 43–75)
NEUTS SEG NFR BLD AUTO: 59 % (ref 43–75)
NEUTS SEG NFR BLD AUTO: 64 % (ref 43–75)
NEUTS SEG NFR BLD AUTO: 76 % (ref 43–75)
NEUTS SEG NFR BLD AUTO: 78 % (ref 43–75)
NEUTS SEG NFR BLD AUTO: 82 % (ref 43–75)
NONHDLC SERPL-MCNC: 42 MG/DL
NRBC BLD AUTO-RTO: 0 /100 WBCS
O2 CT BLDA-SCNC: 14.9 ML/DL (ref 16–23)
O2 CT BLDV-SCNC: 12.8 ML/DL
O2 CT BLDV-SCNC: 14.7 ML/DL
OTHER FIO2: ABNORMAL %
OXYHGB MFR BLDA: 96.2 % (ref 94–97)
P AXIS: -28 DEGREES
P AXIS: -3 DEGREES
P AXIS: 0 DEGREES
P AXIS: 0 DEGREES
P AXIS: 185 DEGREES
P AXIS: 5 DEGREES
P AXIS: 8 DEGREES
PCO2 BLD: 27 MMOL/L (ref 21–32)
PCO2 BLD: 37 MM HG (ref 36–44)
PCO2 BLDA: 33.5 MM HG (ref 36–44)
PCO2 BLDV: 42.6 MM HG (ref 42–50)
PCO2 BLDV: 46 MM HG (ref 42–50)
PH BLD: 7.46 [PH] (ref 7.35–7.45)
PH BLDA: 7.48 [PH] (ref 7.35–7.45)
PH BLDV: 7.37 [PH] (ref 7.3–7.4)
PH BLDV: 7.38 [PH] (ref 7.3–7.4)
PHOSPHATE SERPL-MCNC: 1 MG/DL (ref 2.3–4.1)
PHOSPHATE SERPL-MCNC: 1.5 MG/DL (ref 2.3–4.1)
PHOSPHATE SERPL-MCNC: 1.9 MG/DL (ref 2.5–5)
PHOSPHATE SERPL-MCNC: 2 MG/DL (ref 2.5–5)
PHOSPHATE SERPL-MCNC: 2.2 MG/DL (ref 2.3–4.1)
PHOSPHATE SERPL-MCNC: 2.7 MG/DL (ref 2.5–5)
PHOSPHATE SERPL-MCNC: 3.5 MG/DL (ref 2.3–4.1)
PHOSPHATE SERPL-MCNC: 3.5 MG/DL (ref 2.3–4.1)
PHOSPHATE SERPL-MCNC: 3.7 MG/DL (ref 2.3–4.1)
PHOSPHATE SERPL-MCNC: 3.9 MG/DL (ref 2.3–4.1)
PHOSPHATE SERPL-MCNC: 4 MG/DL (ref 2.3–4.1)
PHOSPHATE SERPL-MCNC: 4.3 MG/DL (ref 2.3–4.1)
PLATELET # BLD AUTO: 107 THOUSANDS/UL (ref 149–390)
PLATELET # BLD AUTO: 133 THOUSANDS/UL (ref 149–390)
PLATELET # BLD AUTO: 174 THOUSANDS/UL (ref 149–390)
PLATELET # BLD AUTO: 175 THOUSANDS/UL (ref 149–390)
PLATELET # BLD AUTO: 179 THOUSANDS/UL (ref 149–390)
PLATELET # BLD AUTO: 192 THOUSANDS/UL (ref 149–390)
PLATELET # BLD AUTO: 197 THOUSANDS/UL (ref 149–390)
PLATELET # BLD AUTO: 202 THOUSANDS/UL (ref 149–390)
PLATELET # BLD AUTO: 203 THOUSANDS/UL (ref 149–390)
PLATELET # BLD AUTO: 203 THOUSANDS/UL (ref 149–390)
PLATELET # BLD AUTO: 238 THOUSANDS/UL (ref 149–390)
PLATELET BLD QL SMEAR: ADEQUATE
PMV BLD AUTO: 11.1 FL (ref 8.9–12.7)
PMV BLD AUTO: 11.7 FL (ref 8.9–12.7)
PMV BLD AUTO: 11.7 FL (ref 8.9–12.7)
PMV BLD AUTO: 12 FL (ref 8.9–12.7)
PMV BLD AUTO: 12.2 FL (ref 8.9–12.7)
PMV BLD AUTO: 12.3 FL (ref 8.9–12.7)
PMV BLD AUTO: 12.4 FL (ref 8.9–12.7)
PMV BLD AUTO: 12.5 FL (ref 8.9–12.7)
PMV BLD AUTO: 12.6 FL (ref 8.9–12.7)
PMV BLD AUTO: 12.7 FL (ref 8.9–12.7)
PMV BLD AUTO: 12.9 FL (ref 8.9–12.7)
PO2 BLD: 108 MM HG (ref 75–129)
PO2 BLDA: 89.3 MM HG (ref 75–129)
PO2 BLDV: 43.7 MM HG (ref 35–45)
PO2 BLDV: 46.3 MM HG (ref 35–45)
POTASSIUM BLD-SCNC: 3.7 MMOL/L (ref 3.5–5.3)
POTASSIUM SERPL-SCNC: 2.5 MMOL/L (ref 3.5–5.3)
POTASSIUM SERPL-SCNC: 2.7 MMOL/L (ref 3.5–5)
POTASSIUM SERPL-SCNC: 2.9 MMOL/L (ref 3.5–5.3)
POTASSIUM SERPL-SCNC: 3 MMOL/L (ref 3.5–5.3)
POTASSIUM SERPL-SCNC: 3 MMOL/L (ref 3.5–5.3)
POTASSIUM SERPL-SCNC: 3.3 MMOL/L (ref 3.5–5.3)
POTASSIUM SERPL-SCNC: 3.4 MMOL/L (ref 3.5–5.3)
POTASSIUM SERPL-SCNC: 3.4 MMOL/L (ref 3.5–5.3)
POTASSIUM SERPL-SCNC: 3.5 MMOL/L (ref 3.5–5)
POTASSIUM SERPL-SCNC: 3.5 MMOL/L (ref 3.5–5.3)
POTASSIUM SERPL-SCNC: 3.6 MMOL/L (ref 3.5–5)
POTASSIUM SERPL-SCNC: 3.6 MMOL/L (ref 3.5–5.3)
POTASSIUM SERPL-SCNC: 3.6 MMOL/L (ref 3.5–5.3)
POTASSIUM SERPL-SCNC: 3.7 MMOL/L (ref 3.5–5.3)
POTASSIUM SERPL-SCNC: 3.8 MMOL/L (ref 3.5–5)
POTASSIUM SERPL-SCNC: 3.8 MMOL/L (ref 3.5–5.3)
POTASSIUM SERPL-SCNC: 3.8 MMOL/L (ref 3.5–5.3)
POTASSIUM SERPL-SCNC: 4.2 MMOL/L (ref 3.5–5.3)
POTASSIUM SERPL-SCNC: 4.4 MMOL/L (ref 3.5–5.3)
POTASSIUM SERPL-SCNC: 4.6 MMOL/L (ref 3.5–5.3)
POTASSIUM SERPL-SCNC: 4.7 MMOL/L (ref 3.5–5.3)
PR INTERVAL: 117 MS
PR INTERVAL: 192 MS
PR INTERVAL: 200 MS
PR INTERVAL: 212 MS
PR INTERVAL: 218 MS
PR INTERVAL: 224 MS
PR INTERVAL: 64 MS
PROCALCITONIN SERPL-MCNC: 0.41 NG/ML
PROT SERPL-MCNC: 5 G/DL (ref 6.4–8.2)
PROT SERPL-MCNC: 5.2 G/DL (ref 6.4–8.2)
PROT SERPL-MCNC: 5.3 G/DL (ref 6.4–8.2)
PROT SERPL-MCNC: 5.3 G/DL (ref 6.4–8.3)
PROTHROMBIN TIME: 12.5 SECONDS (ref 11.6–14.5)
PROTHROMBIN TIME: 13.1 SECONDS (ref 11.6–14.5)
PROTHROMBIN TIME: 14.2 SECONDS (ref 11.6–14.5)
PV PEAK GRADIENT: 1 MMHG
QRS AXIS: -69 DEGREES
QRS AXIS: -71 DEGREES
QRS AXIS: -76 DEGREES
QRS AXIS: -77 DEGREES
QRS AXIS: -77 DEGREES
QRS AXIS: -78 DEGREES
QRS AXIS: -79 DEGREES
QRSD INTERVAL: 134 MS
QRSD INTERVAL: 152 MS
QRSD INTERVAL: 158 MS
QRSD INTERVAL: 162 MS
QRSD INTERVAL: 162 MS
QRSD INTERVAL: 167 MS
QRSD INTERVAL: 168 MS
QRSD INTERVAL: 190 MS
QRSD INTERVAL: 198 MS
QT INTERVAL: 378 MS
QT INTERVAL: 396 MS
QT INTERVAL: 424 MS
QT INTERVAL: 437 MS
QT INTERVAL: 440 MS
QT INTERVAL: 447 MS
QT INTERVAL: 454 MS
QT INTERVAL: 471 MS
QT INTERVAL: 492 MS
QTC INTERVAL: 510 MS
QTC INTERVAL: 511 MS
QTC INTERVAL: 541 MS
QTC INTERVAL: 552 MS
QTC INTERVAL: 564 MS
QTC INTERVAL: 575 MS
QTC INTERVAL: 578 MS
QTC INTERVAL: 586 MS
QTC INTERVAL: 626 MS
RBC # BLD AUTO: 3.15 MILLION/UL (ref 3.81–5.12)
RBC # BLD AUTO: 3.2 MILLION/UL (ref 3.81–5.12)
RBC # BLD AUTO: 3.21 MILLION/UL (ref 3.81–5.12)
RBC # BLD AUTO: 3.67 MILLION/UL (ref 3.81–5.12)
RBC # BLD AUTO: 3.67 MILLION/UL (ref 3.81–5.12)
RBC # BLD AUTO: 3.77 MILLION/UL (ref 3.81–5.12)
RBC # BLD AUTO: 3.94 MILLION/UL (ref 3.81–5.12)
RBC # BLD AUTO: 4.33 MILLION/UL (ref 3.81–5.12)
RBC # BLD AUTO: 4.46 MILLION/UL (ref 3.81–5.12)
RBC # BLD AUTO: 4.54 MILLION/UL (ref 3.81–5.12)
RBC # BLD AUTO: 4.75 MILLION/UL (ref 3.81–5.12)
RBC MORPH BLD: NORMAL
RIGHT ATRIUM AREA SYSTOLE A4C: 7.3 CM2
RIGHT VENTRICLE ID DIMENSION: 3 CM
RSV RNA RESP QL NAA+PROBE: NEGATIVE
SAO2 % BLD FROM PO2: 98 % (ref 60–85)
SARS-COV-2 RNA RESP QL NAA+PROBE: NEGATIVE
SL CV AV DECELERATION TIME RETROGRADE: 1201 MS
SL CV AV PEAK GRADIENT RETROGRADE: 46 MMHG
SL CV DOP CALC MV VTI RETROGRADE: 202.2 CM
SL CV LV EF: 35
SL CV MV MEAN GRADIENT RETROGRADE: 87 MMHG
SL CV PED ECHO LEFT VENTRICLE DIASTOLIC VOLUME (MOD BIPLANE) 2D: 76 ML
SL CV PED ECHO LEFT VENTRICLE SYSTOLIC VOLUME (MOD BIPLANE) 2D: 33 ML
SL CV RVOT MAX GRADIENT: 2 MMHG
SODIUM BLD-SCNC: 137 MMOL/L (ref 136–145)
SODIUM SERPL-SCNC: 133 MMOL/L (ref 136–145)
SODIUM SERPL-SCNC: 136 MMOL/L (ref 136–145)
SODIUM SERPL-SCNC: 137 MMOL/L (ref 133–145)
SODIUM SERPL-SCNC: 138 MMOL/L (ref 136–145)
SODIUM SERPL-SCNC: 139 MMOL/L (ref 133–145)
SODIUM SERPL-SCNC: 139 MMOL/L (ref 136–145)
SODIUM SERPL-SCNC: 140 MMOL/L (ref 136–145)
SODIUM SERPL-SCNC: 140 MMOL/L (ref 136–145)
SODIUM SERPL-SCNC: 141 MMOL/L (ref 136–145)
SODIUM SERPL-SCNC: 141 MMOL/L (ref 136–145)
SODIUM SERPL-SCNC: 142 MMOL/L (ref 136–145)
SODIUM SERPL-SCNC: 143 MMOL/L (ref 133–145)
SODIUM SERPL-SCNC: 143 MMOL/L (ref 133–145)
SODIUM SERPL-SCNC: 143 MMOL/L (ref 136–145)
SODIUM SERPL-SCNC: 144 MMOL/L (ref 136–145)
SODIUM SERPL-SCNC: 149 MMOL/L (ref 136–145)
SODIUM SERPL-SCNC: 150 MMOL/L (ref 136–145)
SPECIMEN SOURCE: ABNORMAL
SPECIMEN SOURCE: ABNORMAL
T WAVE AXIS: 100 DEGREES
T WAVE AXIS: 109 DEGREES
T WAVE AXIS: 148 DEGREES
T WAVE AXIS: 172 DEGREES
T WAVE AXIS: 83 DEGREES
T WAVE AXIS: 87 DEGREES
T WAVE AXIS: 89 DEGREES
T WAVE AXIS: 93 DEGREES
T WAVE AXIS: 94 DEGREES
TRICUSPID VALVE PEAK REGURGITATION VELOCITY: 2.39 M/S
TRICUSPID VALVE S': 1.7 CM/S
TRIGL SERPL-MCNC: 195 MG/DL
TV PEAK GRADIENT: 23 MMHG
VENTRICULAR RATE: 100 BPM
VENTRICULAR RATE: 104 BPM
VENTRICULAR RATE: 106 BPM
VENTRICULAR RATE: 110 BPM
VENTRICULAR RATE: 115 BPM
VENTRICULAR RATE: 83 BPM
VENTRICULAR RATE: 88 BPM
VENTRICULAR RATE: 89 BPM
VENTRICULAR RATE: 99 BPM
WBC # BLD AUTO: 10.36 THOUSAND/UL (ref 4.31–10.16)
WBC # BLD AUTO: 4.84 THOUSAND/UL (ref 4.31–10.16)
WBC # BLD AUTO: 5.47 THOUSAND/UL (ref 4.31–10.16)
WBC # BLD AUTO: 5.49 THOUSAND/UL (ref 4.31–10.16)
WBC # BLD AUTO: 5.64 THOUSAND/UL (ref 4.31–10.16)
WBC # BLD AUTO: 5.68 THOUSAND/UL (ref 4.31–10.16)
WBC # BLD AUTO: 6 THOUSAND/UL (ref 4.31–10.16)
WBC # BLD AUTO: 6.46 THOUSAND/UL (ref 4.31–10.16)
WBC # BLD AUTO: 6.72 THOUSAND/UL (ref 4.31–10.16)
WBC # BLD AUTO: 6.96 THOUSAND/UL (ref 4.31–10.16)
WBC # BLD AUTO: 7.12 THOUSAND/UL (ref 4.31–10.16)
WBC # FLD MANUAL: 0 /UL
Z-SCORE OF LEFT VENTRICULAR DIMENSION IN END SYSTOLE: -1.11

## 2021-01-01 PROCEDURE — 80048 BASIC METABOLIC PNL TOTAL CA: CPT | Performed by: PHYSICIAN ASSISTANT

## 2021-01-01 PROCEDURE — 85610 PROTHROMBIN TIME: CPT | Performed by: INTERNAL MEDICINE

## 2021-01-01 PROCEDURE — 85025 COMPLETE CBC W/AUTO DIFF WBC: CPT | Performed by: PHYSICIAN ASSISTANT

## 2021-01-01 PROCEDURE — 85027 COMPLETE CBC AUTOMATED: CPT | Performed by: PHYSICIAN ASSISTANT

## 2021-01-01 PROCEDURE — 99233 SBSQ HOSP IP/OBS HIGH 50: CPT | Performed by: NURSE PRACTITIONER

## 2021-01-01 PROCEDURE — 99232 SBSQ HOSP IP/OBS MODERATE 35: CPT | Performed by: PSYCHIATRY & NEUROLOGY

## 2021-01-01 PROCEDURE — 99232 SBSQ HOSP IP/OBS MODERATE 35: CPT | Performed by: INTERNAL MEDICINE

## 2021-01-01 PROCEDURE — 99233 SBSQ HOSP IP/OBS HIGH 50: CPT | Performed by: PSYCHIATRY & NEUROLOGY

## 2021-01-01 PROCEDURE — 02H633Z INSERTION OF INFUSION DEVICE INTO RIGHT ATRIUM, PERCUTANEOUS APPROACH: ICD-10-PCS | Performed by: RADIOLOGY

## 2021-01-01 PROCEDURE — 93010 ELECTROCARDIOGRAM REPORT: CPT | Performed by: INTERNAL MEDICINE

## 2021-01-01 PROCEDURE — 87081 CULTURE SCREEN ONLY: CPT | Performed by: FAMILY MEDICINE

## 2021-01-01 PROCEDURE — 99223 1ST HOSP IP/OBS HIGH 75: CPT | Performed by: PSYCHIATRY & NEUROLOGY

## 2021-01-01 PROCEDURE — 82948 REAGENT STRIP/BLOOD GLUCOSE: CPT

## 2021-01-01 PROCEDURE — 80053 COMPREHEN METABOLIC PANEL: CPT | Performed by: INTERNAL MEDICINE

## 2021-01-01 PROCEDURE — 83735 ASSAY OF MAGNESIUM: CPT | Performed by: PHYSICIAN ASSISTANT

## 2021-01-01 PROCEDURE — 80048 BASIC METABOLIC PNL TOTAL CA: CPT | Performed by: NURSE PRACTITIONER

## 2021-01-01 PROCEDURE — 80053 COMPREHEN METABOLIC PANEL: CPT | Performed by: PHYSICIAN ASSISTANT

## 2021-01-01 PROCEDURE — 76937 US GUIDE VASCULAR ACCESS: CPT

## 2021-01-01 PROCEDURE — 84100 ASSAY OF PHOSPHORUS: CPT | Performed by: PHYSICIAN ASSISTANT

## 2021-01-01 PROCEDURE — 83605 ASSAY OF LACTIC ACID: CPT | Performed by: PHYSICIAN ASSISTANT

## 2021-01-01 PROCEDURE — 84145 PROCALCITONIN (PCT): CPT | Performed by: PHYSICIAN ASSISTANT

## 2021-01-01 PROCEDURE — 85027 COMPLETE CBC AUTOMATED: CPT | Performed by: FAMILY MEDICINE

## 2021-01-01 PROCEDURE — 36558 INSERT TUNNELED CV CATH: CPT | Performed by: RADIOLOGY

## 2021-01-01 PROCEDURE — 85610 PROTHROMBIN TIME: CPT | Performed by: PHYSICIAN ASSISTANT

## 2021-01-01 PROCEDURE — 96366 THER/PROPH/DIAG IV INF ADDON: CPT

## 2021-01-01 PROCEDURE — NC001 PR NO CHARGE: Performed by: RADIOLOGY

## 2021-01-01 PROCEDURE — 77001 FLUOROGUIDE FOR VEIN DEVICE: CPT | Performed by: RADIOLOGY

## 2021-01-01 PROCEDURE — 86704 HEP B CORE ANTIBODY TOTAL: CPT | Performed by: INTERNAL MEDICINE

## 2021-01-01 PROCEDURE — 92526 ORAL FUNCTION THERAPY: CPT

## 2021-01-01 PROCEDURE — 85730 THROMBOPLASTIN TIME PARTIAL: CPT | Performed by: PHYSICIAN ASSISTANT

## 2021-01-01 PROCEDURE — 84484 ASSAY OF TROPONIN QUANT: CPT | Performed by: PHYSICIAN ASSISTANT

## 2021-01-01 PROCEDURE — 74176 CT ABD & PELVIS W/O CONTRAST: CPT

## 2021-01-01 PROCEDURE — 85027 COMPLETE CBC AUTOMATED: CPT | Performed by: INTERNAL MEDICINE

## 2021-01-01 PROCEDURE — 99222 1ST HOSP IP/OBS MODERATE 55: CPT | Performed by: INTERNAL MEDICINE

## 2021-01-01 PROCEDURE — 70450 CT HEAD/BRAIN W/O DYE: CPT

## 2021-01-01 PROCEDURE — G1004 CDSM NDSC: HCPCS

## 2021-01-01 PROCEDURE — 80076 HEPATIC FUNCTION PANEL: CPT | Performed by: PHYSICIAN ASSISTANT

## 2021-01-01 PROCEDURE — NC001 PR NO CHARGE: Performed by: PSYCHIATRY & NEUROLOGY

## 2021-01-01 PROCEDURE — 82803 BLOOD GASES ANY COMBINATION: CPT

## 2021-01-01 PROCEDURE — 99233 SBSQ HOSP IP/OBS HIGH 50: CPT | Performed by: INTERNAL MEDICINE

## 2021-01-01 PROCEDURE — 99285 EMERGENCY DEPT VISIT HI MDM: CPT

## 2021-01-01 PROCEDURE — 99239 HOSP IP/OBS DSCHRG MGMT >30: CPT | Performed by: INTERNAL MEDICINE

## 2021-01-01 PROCEDURE — C8929 TTE W OR WO FOL WCON,DOPPLER: HCPCS

## 2021-01-01 PROCEDURE — 99232 SBSQ HOSP IP/OBS MODERATE 35: CPT | Performed by: HOSPITALIST

## 2021-01-01 PROCEDURE — 80048 BASIC METABOLIC PNL TOTAL CA: CPT | Performed by: INTERNAL MEDICINE

## 2021-01-01 PROCEDURE — 99223 1ST HOSP IP/OBS HIGH 75: CPT | Performed by: STUDENT IN AN ORGANIZED HEALTH CARE EDUCATION/TRAINING PROGRAM

## 2021-01-01 PROCEDURE — 99292 CRITICAL CARE ADDL 30 MIN: CPT | Performed by: INTERNAL MEDICINE

## 2021-01-01 PROCEDURE — 99223 1ST HOSP IP/OBS HIGH 75: CPT | Performed by: PHYSICIAN ASSISTANT

## 2021-01-01 PROCEDURE — 97530 THERAPEUTIC ACTIVITIES: CPT

## 2021-01-01 PROCEDURE — 0241U HB NFCT DS VIR RESP RNA 4 TRGT: CPT | Performed by: PHYSICIAN ASSISTANT

## 2021-01-01 PROCEDURE — 90935 HEMODIALYSIS ONE EVALUATION: CPT | Performed by: INTERNAL MEDICINE

## 2021-01-01 PROCEDURE — 82330 ASSAY OF CALCIUM: CPT | Performed by: PHYSICIAN ASSISTANT

## 2021-01-01 PROCEDURE — 84295 ASSAY OF SERUM SODIUM: CPT

## 2021-01-01 PROCEDURE — 85007 BL SMEAR W/DIFF WBC COUNT: CPT | Performed by: INTERNAL MEDICINE

## 2021-01-01 PROCEDURE — 80048 BASIC METABOLIC PNL TOTAL CA: CPT | Performed by: STUDENT IN AN ORGANIZED HEALTH CARE EDUCATION/TRAINING PROGRAM

## 2021-01-01 PROCEDURE — 95819 EEG AWAKE AND ASLEEP: CPT | Performed by: STUDENT IN AN ORGANIZED HEALTH CARE EDUCATION/TRAINING PROGRAM

## 2021-01-01 PROCEDURE — 99292 CRITICAL CARE ADDL 30 MIN: CPT | Performed by: PHYSICIAN ASSISTANT

## 2021-01-01 PROCEDURE — 92610 EVALUATE SWALLOWING FUNCTION: CPT

## 2021-01-01 PROCEDURE — 80048 BASIC METABOLIC PNL TOTAL CA: CPT | Performed by: FAMILY MEDICINE

## 2021-01-01 PROCEDURE — 85025 COMPLETE CBC W/AUTO DIFF WBC: CPT | Performed by: INTERNAL MEDICINE

## 2021-01-01 PROCEDURE — 87340 HEPATITIS B SURFACE AG IA: CPT | Performed by: INTERNAL MEDICINE

## 2021-01-01 PROCEDURE — 82805 BLOOD GASES W/O2 SATURATION: CPT | Performed by: INTERNAL MEDICINE

## 2021-01-01 PROCEDURE — 36558 INSERT TUNNELED CV CATH: CPT

## 2021-01-01 PROCEDURE — 80061 LIPID PANEL: CPT | Performed by: PSYCHIATRY & NEUROLOGY

## 2021-01-01 PROCEDURE — 99285 EMERGENCY DEPT VISIT HI MDM: CPT | Performed by: PHYSICIAN ASSISTANT

## 2021-01-01 PROCEDURE — 36415 COLL VENOUS BLD VENIPUNCTURE: CPT | Performed by: PHYSICIAN ASSISTANT

## 2021-01-01 PROCEDURE — C1769 GUIDE WIRE: HCPCS

## 2021-01-01 PROCEDURE — C1750 CATH, HEMODIALYSIS,LONG-TERM: HCPCS

## 2021-01-01 PROCEDURE — 71045 X-RAY EXAM CHEST 1 VIEW: CPT

## 2021-01-01 PROCEDURE — NC001 PR NO CHARGE: Performed by: SURGERY

## 2021-01-01 PROCEDURE — 93880 EXTRACRANIAL BILAT STUDY: CPT | Performed by: SURGERY

## 2021-01-01 PROCEDURE — 93005 ELECTROCARDIOGRAM TRACING: CPT

## 2021-01-01 PROCEDURE — 99232 SBSQ HOSP IP/OBS MODERATE 35: CPT | Performed by: FAMILY MEDICINE

## 2021-01-01 PROCEDURE — 99291 CRITICAL CARE FIRST HOUR: CPT | Performed by: PHYSICIAN ASSISTANT

## 2021-01-01 PROCEDURE — 82140 ASSAY OF AMMONIA: CPT | Performed by: FAMILY MEDICINE

## 2021-01-01 PROCEDURE — 86706 HEP B SURFACE ANTIBODY: CPT | Performed by: INTERNAL MEDICINE

## 2021-01-01 PROCEDURE — 86803 HEPATITIS C AB TEST: CPT | Performed by: INTERNAL MEDICINE

## 2021-01-01 PROCEDURE — 82805 BLOOD GASES W/O2 SATURATION: CPT | Performed by: PHYSICIAN ASSISTANT

## 2021-01-01 PROCEDURE — 99309 SBSQ NF CARE MODERATE MDM 30: CPT | Performed by: NURSE PRACTITIONER

## 2021-01-01 PROCEDURE — 85025 COMPLETE CBC W/AUTO DIFF WBC: CPT | Performed by: NURSE PRACTITIONER

## 2021-01-01 PROCEDURE — 80048 BASIC METABOLIC PNL TOTAL CA: CPT | Performed by: EMERGENCY MEDICINE

## 2021-01-01 PROCEDURE — 93010 ELECTROCARDIOGRAM REPORT: CPT | Performed by: NURSE PRACTITIONER

## 2021-01-01 PROCEDURE — 85014 HEMATOCRIT: CPT

## 2021-01-01 PROCEDURE — 82010 KETONE BODYS QUAN: CPT | Performed by: PHYSICIAN ASSISTANT

## 2021-01-01 PROCEDURE — 70496 CT ANGIOGRAPHY HEAD: CPT

## 2021-01-01 PROCEDURE — 83735 ASSAY OF MAGNESIUM: CPT | Performed by: NURSE PRACTITIONER

## 2021-01-01 PROCEDURE — 97167 OT EVAL HIGH COMPLEX 60 MIN: CPT

## 2021-01-01 PROCEDURE — 5A1D70Z PERFORMANCE OF URINARY FILTRATION, INTERMITTENT, LESS THAN 6 HOURS PER DAY: ICD-10-PCS | Performed by: INTERNAL MEDICINE

## 2021-01-01 PROCEDURE — 96374 THER/PROPH/DIAG INJ IV PUSH: CPT

## 2021-01-01 PROCEDURE — 95816 EEG AWAKE AND DROWSY: CPT

## 2021-01-01 PROCEDURE — 84100 ASSAY OF PHOSPHORUS: CPT | Performed by: NURSE PRACTITIONER

## 2021-01-01 PROCEDURE — 84132 ASSAY OF SERUM POTASSIUM: CPT

## 2021-01-01 PROCEDURE — 84100 ASSAY OF PHOSPHORUS: CPT | Performed by: EMERGENCY MEDICINE

## 2021-01-01 PROCEDURE — 77001 FLUOROGUIDE FOR VEIN DEVICE: CPT

## 2021-01-01 PROCEDURE — 83036 HEMOGLOBIN GLYCOSYLATED A1C: CPT | Performed by: NURSE PRACTITIONER

## 2021-01-01 PROCEDURE — 87205 SMEAR GRAM STAIN: CPT | Performed by: INTERNAL MEDICINE

## 2021-01-01 PROCEDURE — 84100 ASSAY OF PHOSPHORUS: CPT | Performed by: INTERNAL MEDICINE

## 2021-01-01 PROCEDURE — 99306 1ST NF CARE HIGH MDM 50: CPT | Performed by: FAMILY MEDICINE

## 2021-01-01 PROCEDURE — 36600 WITHDRAWAL OF ARTERIAL BLOOD: CPT

## 2021-01-01 PROCEDURE — 89051 BODY FLUID CELL COUNT: CPT | Performed by: INTERNAL MEDICINE

## 2021-01-01 PROCEDURE — 97535 SELF CARE MNGMENT TRAINING: CPT

## 2021-01-01 PROCEDURE — 96365 THER/PROPH/DIAG IV INF INIT: CPT

## 2021-01-01 PROCEDURE — 97163 PT EVAL HIGH COMPLEX 45 MIN: CPT

## 2021-01-01 PROCEDURE — 96361 HYDRATE IV INFUSION ADD-ON: CPT

## 2021-01-01 PROCEDURE — 87070 CULTURE OTHR SPECIMN AEROBIC: CPT | Performed by: INTERNAL MEDICINE

## 2021-01-01 PROCEDURE — 82947 ASSAY GLUCOSE BLOOD QUANT: CPT

## 2021-01-01 PROCEDURE — 99223 1ST HOSP IP/OBS HIGH 75: CPT | Performed by: NURSE PRACTITIONER

## 2021-01-01 PROCEDURE — 99232 SBSQ HOSP IP/OBS MODERATE 35: CPT | Performed by: PHYSICIAN ASSISTANT

## 2021-01-01 PROCEDURE — 86705 HEP B CORE ANTIBODY IGM: CPT | Performed by: INTERNAL MEDICINE

## 2021-01-01 PROCEDURE — 93880 EXTRACRANIAL BILAT STUDY: CPT

## 2021-01-01 PROCEDURE — 93306 TTE W/DOPPLER COMPLETE: CPT | Performed by: INTERNAL MEDICINE

## 2021-01-01 PROCEDURE — 70498 CT ANGIOGRAPHY NECK: CPT

## 2021-01-01 PROCEDURE — 76937 US GUIDE VASCULAR ACCESS: CPT | Performed by: RADIOLOGY

## 2021-01-01 PROCEDURE — 71250 CT THORAX DX C-: CPT

## 2021-01-01 PROCEDURE — C1894 INTRO/SHEATH, NON-LASER: HCPCS

## 2021-01-01 PROCEDURE — 71046 X-RAY EXAM CHEST 2 VIEWS: CPT

## 2021-01-01 RX ORDER — HEPARIN SODIUM 5000 [USP'U]/ML
5000 INJECTION, SOLUTION INTRAVENOUS; SUBCUTANEOUS EVERY 8 HOURS SCHEDULED
Status: DISCONTINUED | OUTPATIENT
Start: 2021-01-01 | End: 2021-01-01 | Stop reason: HOSPADM

## 2021-01-01 RX ORDER — FUROSEMIDE 80 MG
80 TABLET ORAL DAILY
COMMUNITY
End: 2021-01-01 | Stop reason: HOSPADM

## 2021-01-01 RX ORDER — CLONIDINE HYDROCHLORIDE 0.1 MG/1
0.1 TABLET ORAL ONCE
COMMUNITY
End: 2021-01-01 | Stop reason: HOSPADM

## 2021-01-01 RX ORDER — ESOMEPRAZOLE MAGNESIUM 40 MG/1
40 CAPSULE, DELAYED RELEASE ORAL
COMMUNITY

## 2021-01-01 RX ORDER — METOPROLOL SUCCINATE 25 MG/1
25 TABLET, EXTENDED RELEASE ORAL ONCE
Status: COMPLETED | OUTPATIENT
Start: 2021-01-01 | End: 2021-01-01

## 2021-01-01 RX ORDER — SODIUM CHLORIDE, SODIUM GLUCONATE, SODIUM ACETATE, POTASSIUM CHLORIDE, MAGNESIUM CHLORIDE, SODIUM PHOSPHATE, DIBASIC, AND POTASSIUM PHOSPHATE .53; .5; .37; .037; .03; .012; .00082 G/100ML; G/100ML; G/100ML; G/100ML; G/100ML; G/100ML; G/100ML
250 INJECTION, SOLUTION INTRAVENOUS CONTINUOUS
Status: DISCONTINUED | OUTPATIENT
Start: 2021-01-01 | End: 2021-01-01

## 2021-01-01 RX ORDER — CALCITRIOL 0.25 UG/1
0.25 CAPSULE, LIQUID FILLED ORAL DAILY
Status: DISCONTINUED | OUTPATIENT
Start: 2021-01-01 | End: 2021-01-01

## 2021-01-01 RX ORDER — MIDODRINE HYDROCHLORIDE 5 MG/1
5 TABLET ORAL ONCE
Status: COMPLETED | OUTPATIENT
Start: 2021-01-01 | End: 2021-01-01

## 2021-01-01 RX ORDER — AMLODIPINE BESYLATE 5 MG/1
5 TABLET ORAL DAILY
Status: DISCONTINUED | OUTPATIENT
Start: 2021-01-01 | End: 2021-01-01

## 2021-01-01 RX ORDER — PERPHENAZINE 4 MG/1
4 TABLET, FILM COATED ORAL 3 TIMES DAILY
Status: DISCONTINUED | OUTPATIENT
Start: 2021-01-01 | End: 2021-01-01 | Stop reason: HOSPADM

## 2021-01-01 RX ORDER — INSULIN GLARGINE 100 [IU]/ML
8 INJECTION, SOLUTION SUBCUTANEOUS
Status: DISCONTINUED | OUTPATIENT
Start: 2021-01-01 | End: 2021-01-01

## 2021-01-01 RX ORDER — INSULIN GLARGINE 100 [IU]/ML
14 INJECTION, SOLUTION SUBCUTANEOUS
Status: DISCONTINUED | OUTPATIENT
Start: 2021-01-01 | End: 2021-01-01

## 2021-01-01 RX ORDER — CLOPIDOGREL BISULFATE 75 MG/1
75 TABLET ORAL DAILY
COMMUNITY

## 2021-01-01 RX ORDER — GENTAMICIN SULFATE 1 MG/G
1 CREAM TOPICAL DAILY
Qty: 15 G | Refills: 0 | Status: SHIPPED | OUTPATIENT
Start: 2021-01-01

## 2021-01-01 RX ORDER — POTASSIUM CHLORIDE 14.9 MG/ML
20 INJECTION INTRAVENOUS ONCE
Status: COMPLETED | OUTPATIENT
Start: 2021-01-01 | End: 2021-01-01

## 2021-01-01 RX ORDER — EPOETIN ALFA-EPBX 4000 [IU]/ML
1 INJECTION, SOLUTION INTRAVENOUS; SUBCUTANEOUS 3 TIMES WEEKLY
COMMUNITY
End: 2022-01-01 | Stop reason: HOSPADM

## 2021-01-01 RX ORDER — CHOLECALCIFEROL (VITAMIN D3) 10 MCG
1 TABLET ORAL
Status: DISCONTINUED | OUTPATIENT
Start: 2021-01-01 | End: 2021-01-01 | Stop reason: HOSPADM

## 2021-01-01 RX ORDER — MIDODRINE HYDROCHLORIDE 5 MG/1
5 TABLET ORAL 3 TIMES WEEKLY
COMMUNITY

## 2021-01-01 RX ORDER — MAGNESIUM SULFATE HEPTAHYDRATE 40 MG/ML
4 INJECTION, SOLUTION INTRAVENOUS ONCE
Status: COMPLETED | OUTPATIENT
Start: 2021-01-01 | End: 2021-01-01

## 2021-01-01 RX ORDER — LOSARTAN POTASSIUM 50 MG/1
50 TABLET ORAL 2 TIMES DAILY
Status: DISCONTINUED | OUTPATIENT
Start: 2021-01-01 | End: 2021-01-01

## 2021-01-01 RX ORDER — SODIUM CHLORIDE, SODIUM GLUCONATE, SODIUM ACETATE, POTASSIUM CHLORIDE, MAGNESIUM CHLORIDE, SODIUM PHOSPHATE, DIBASIC, AND POTASSIUM PHOSPHATE .53; .5; .37; .037; .03; .012; .00082 G/100ML; G/100ML; G/100ML; G/100ML; G/100ML; G/100ML; G/100ML
1000 INJECTION, SOLUTION INTRAVENOUS ONCE
Status: DISCONTINUED | OUTPATIENT
Start: 2021-01-01 | End: 2021-01-01 | Stop reason: HOSPADM

## 2021-01-01 RX ORDER — SEVELAMER CARBONATE FOR ORAL SUSPENSION 800 MG/1
1 POWDER, FOR SUSPENSION ORAL 3 TIMES DAILY
COMMUNITY

## 2021-01-01 RX ORDER — BUPROPION HYDROCHLORIDE 150 MG/1
150 TABLET, EXTENDED RELEASE ORAL 2 TIMES DAILY
COMMUNITY
End: 2021-01-01 | Stop reason: HOSPADM

## 2021-01-01 RX ORDER — AMLODIPINE BESYLATE 5 MG/1
5 TABLET ORAL DAILY
COMMUNITY
End: 2021-01-01 | Stop reason: HOSPADM

## 2021-01-01 RX ORDER — ACETAMINOPHEN 325 MG/1
975 TABLET ORAL EVERY 8 HOURS SCHEDULED
Status: DISCONTINUED | OUTPATIENT
Start: 2021-01-01 | End: 2021-01-01 | Stop reason: HOSPADM

## 2021-01-01 RX ORDER — CLOPIDOGREL BISULFATE 75 MG/1
75 TABLET ORAL DAILY
Status: DISCONTINUED | OUTPATIENT
Start: 2021-01-01 | End: 2021-01-01

## 2021-01-01 RX ORDER — POTASSIUM CHLORIDE 20 MEQ/1
20 TABLET, EXTENDED RELEASE ORAL ONCE
Status: COMPLETED | OUTPATIENT
Start: 2021-01-01 | End: 2021-01-01

## 2021-01-01 RX ORDER — ONDANSETRON 2 MG/ML
4 INJECTION INTRAMUSCULAR; INTRAVENOUS EVERY 6 HOURS PRN
Status: DISCONTINUED | OUTPATIENT
Start: 2021-01-01 | End: 2021-01-01 | Stop reason: HOSPADM

## 2021-01-01 RX ORDER — ATORVASTATIN CALCIUM 40 MG/1
40 TABLET, FILM COATED ORAL DAILY
COMMUNITY
End: 2021-01-01 | Stop reason: HOSPADM

## 2021-01-01 RX ORDER — MELATONIN
2000 DAILY
Status: DISCONTINUED | OUTPATIENT
Start: 2021-01-01 | End: 2021-01-01 | Stop reason: HOSPADM

## 2021-01-01 RX ORDER — SERTRALINE HYDROCHLORIDE 25 MG/1
25 TABLET, FILM COATED ORAL DAILY
COMMUNITY

## 2021-01-01 RX ORDER — METOPROLOL SUCCINATE 50 MG/1
50 TABLET, EXTENDED RELEASE ORAL DAILY
Status: DISCONTINUED | OUTPATIENT
Start: 2021-01-01 | End: 2021-01-01 | Stop reason: HOSPADM

## 2021-01-01 RX ORDER — DOCUSATE SODIUM 100 MG/1
100 CAPSULE, LIQUID FILLED ORAL 2 TIMES DAILY
COMMUNITY

## 2021-01-01 RX ORDER — DEXTROSE MONOHYDRATE 25 G/50ML
INJECTION, SOLUTION INTRAVENOUS
Status: COMPLETED
Start: 2021-01-01 | End: 2021-01-01

## 2021-01-01 RX ORDER — MIDODRINE HYDROCHLORIDE 5 MG/1
5 TABLET ORAL AS NEEDED
Qty: 30 TABLET | Refills: 0 | Status: SHIPPED | OUTPATIENT
Start: 2021-01-01 | End: 2021-01-01 | Stop reason: ALTCHOICE

## 2021-01-01 RX ORDER — METOPROLOL SUCCINATE 50 MG/1
50 TABLET, EXTENDED RELEASE ORAL DAILY
Qty: 30 TABLET | Refills: 0 | Status: SHIPPED | OUTPATIENT
Start: 2021-01-01 | End: 2021-01-01 | Stop reason: ALTCHOICE

## 2021-01-01 RX ORDER — CHOLESTYRAMINE LIGHT 4 G/5.7G
4 POWDER, FOR SUSPENSION ORAL 2 TIMES DAILY
Qty: 60 PACKET | Refills: 0 | Status: SHIPPED | OUTPATIENT
Start: 2021-01-01 | End: 2022-01-01

## 2021-01-01 RX ORDER — ATORVASTATIN CALCIUM 40 MG/1
40 TABLET, FILM COATED ORAL DAILY
Status: DISCONTINUED | OUTPATIENT
Start: 2021-01-01 | End: 2021-01-01

## 2021-01-01 RX ORDER — ASPIRIN 81 MG/1
81 TABLET ORAL DAILY
Status: DISCONTINUED | OUTPATIENT
Start: 2021-01-01 | End: 2021-01-01

## 2021-01-01 RX ORDER — DEXTROSE MONOHYDRATE 25 G/50ML
25 INJECTION, SOLUTION INTRAVENOUS ONCE
Status: COMPLETED | OUTPATIENT
Start: 2021-01-01 | End: 2021-01-01

## 2021-01-01 RX ORDER — CLOPIDOGREL BISULFATE 75 MG/1
75 TABLET ORAL DAILY
Status: DISCONTINUED | OUTPATIENT
Start: 2021-01-01 | End: 2021-01-01 | Stop reason: HOSPADM

## 2021-01-01 RX ORDER — SEVELAMER CARBONATE 800 MG/1
800 TABLET, FILM COATED ORAL DAILY
COMMUNITY
End: 2021-01-01 | Stop reason: DRUGHIGH

## 2021-01-01 RX ORDER — ALBUMIN, HUMAN INJ 5% 5 %
12.5 SOLUTION INTRAVENOUS ONCE
Status: COMPLETED | OUTPATIENT
Start: 2021-01-01 | End: 2021-01-01

## 2021-01-01 RX ORDER — LOSARTAN POTASSIUM 50 MG/1
50 TABLET ORAL 2 TIMES DAILY
COMMUNITY
End: 2021-01-01 | Stop reason: HOSPADM

## 2021-01-01 RX ORDER — METOPROLOL SUCCINATE 25 MG/1
25 TABLET, EXTENDED RELEASE ORAL DAILY
Status: DISCONTINUED | OUTPATIENT
Start: 2021-01-01 | End: 2021-01-01

## 2021-01-01 RX ORDER — INSULIN GLARGINE 100 [IU]/ML
14 INJECTION, SOLUTION SUBCUTANEOUS
COMMUNITY
End: 2021-01-01 | Stop reason: HOSPADM

## 2021-01-01 RX ORDER — FENOFIBRATE 145 MG/1
145 TABLET, COATED ORAL DAILY
COMMUNITY
End: 2021-01-01 | Stop reason: HOSPADM

## 2021-01-01 RX ORDER — POTASSIUM CHLORIDE 20 MEQ/1
40 TABLET, EXTENDED RELEASE ORAL ONCE
Status: DISCONTINUED | OUTPATIENT
Start: 2021-01-01 | End: 2021-01-01 | Stop reason: HOSPADM

## 2021-01-01 RX ORDER — CHOLESTYRAMINE LIGHT 4 G/5.7G
4 POWDER, FOR SUSPENSION ORAL 2 TIMES DAILY
Status: DISCONTINUED | OUTPATIENT
Start: 2021-01-01 | End: 2021-01-01 | Stop reason: HOSPADM

## 2021-01-01 RX ORDER — POTASSIUM CHLORIDE 20MEQ/15ML
40 LIQUID (ML) ORAL ONCE
Status: COMPLETED | OUTPATIENT
Start: 2021-01-01 | End: 2021-01-01

## 2021-01-01 RX ORDER — SERTRALINE HYDROCHLORIDE 25 MG/1
25 TABLET, FILM COATED ORAL DAILY
Status: DISCONTINUED | OUTPATIENT
Start: 2021-01-01 | End: 2021-01-01

## 2021-01-01 RX ORDER — INSULIN GLARGINE 100 [IU]/ML
10 INJECTION, SOLUTION SUBCUTANEOUS
Status: DISCONTINUED | OUTPATIENT
Start: 2021-01-01 | End: 2021-01-01

## 2021-01-01 RX ORDER — FENTANYL CITRATE 50 UG/ML
INJECTION, SOLUTION INTRAMUSCULAR; INTRAVENOUS CODE/TRAUMA/SEDATION MEDICATION
Status: COMPLETED | OUTPATIENT
Start: 2021-01-01 | End: 2021-01-01

## 2021-01-01 RX ORDER — DEXTROSE MONOHYDRATE 25 G/50ML
50 INJECTION, SOLUTION INTRAVENOUS ONCE
Status: COMPLETED | OUTPATIENT
Start: 2021-01-01 | End: 2021-01-01

## 2021-01-01 RX ORDER — SEVELAMER HYDROCHLORIDE 800 MG/1
800 TABLET, FILM COATED ORAL
Status: DISCONTINUED | OUTPATIENT
Start: 2021-01-01 | End: 2021-01-01

## 2021-01-01 RX ORDER — ACETAMINOPHEN 325 MG/1
650 TABLET ORAL EVERY 6 HOURS PRN
Status: DISCONTINUED | OUTPATIENT
Start: 2021-01-01 | End: 2021-01-01

## 2021-01-01 RX ORDER — SODIUM CHLORIDE, SODIUM GLUCONATE, SODIUM ACETATE, POTASSIUM CHLORIDE, MAGNESIUM CHLORIDE, SODIUM PHOSPHATE, DIBASIC, AND POTASSIUM PHOSPHATE .53; .5; .37; .037; .03; .012; .00082 G/100ML; G/100ML; G/100ML; G/100ML; G/100ML; G/100ML; G/100ML
1000 INJECTION, SOLUTION INTRAVENOUS ONCE
Status: COMPLETED | OUTPATIENT
Start: 2021-01-01 | End: 2021-01-01

## 2021-01-01 RX ORDER — LATANOPROST 50 UG/ML
1 SOLUTION/ DROPS OPHTHALMIC
Status: DISCONTINUED | OUTPATIENT
Start: 2021-01-01 | End: 2021-01-01 | Stop reason: HOSPADM

## 2021-01-01 RX ORDER — PERPHENAZINE 4 MG/1
4 TABLET, FILM COATED ORAL 3 TIMES DAILY
COMMUNITY

## 2021-01-01 RX ORDER — SODIUM CHLORIDE, SODIUM GLUCONATE, SODIUM ACETATE, POTASSIUM CHLORIDE, MAGNESIUM CHLORIDE, SODIUM PHOSPHATE, DIBASIC, AND POTASSIUM PHOSPHATE .53; .5; .37; .037; .03; .012; .00082 G/100ML; G/100ML; G/100ML; G/100ML; G/100ML; G/100ML; G/100ML
500 INJECTION, SOLUTION INTRAVENOUS CONTINUOUS
Status: DISCONTINUED | OUTPATIENT
Start: 2021-01-01 | End: 2021-01-01

## 2021-01-01 RX ORDER — ATORVASTATIN CALCIUM 10 MG/1
10 TABLET, FILM COATED ORAL DAILY
Qty: 30 TABLET | Refills: 0 | Status: SHIPPED | OUTPATIENT
Start: 2021-01-01 | End: 2022-01-01

## 2021-01-01 RX ORDER — MELATONIN
2000 DAILY
COMMUNITY

## 2021-01-01 RX ORDER — GABAPENTIN 300 MG/1
300 CAPSULE ORAL DAILY
COMMUNITY
End: 2021-01-01 | Stop reason: HOSPADM

## 2021-01-01 RX ORDER — POTASSIUM CHLORIDE 20 MEQ/1
20 TABLET, EXTENDED RELEASE ORAL EVERY OTHER DAY
COMMUNITY
End: 2021-01-01 | Stop reason: HOSPADM

## 2021-01-01 RX ORDER — DEXTROSE MONOHYDRATE 50 MG/ML
50 INJECTION, SOLUTION INTRAVENOUS CONTINUOUS
Status: DISCONTINUED | OUTPATIENT
Start: 2021-01-01 | End: 2021-01-01

## 2021-01-01 RX ORDER — ATORVASTATIN CALCIUM 10 MG/1
10 TABLET, FILM COATED ORAL DAILY
Status: DISCONTINUED | OUTPATIENT
Start: 2021-01-01 | End: 2021-01-01 | Stop reason: HOSPADM

## 2021-01-01 RX ORDER — POTASSIUM CHLORIDE 14.9 MG/ML
20 INJECTION INTRAVENOUS
Status: COMPLETED | OUTPATIENT
Start: 2021-01-01 | End: 2021-01-01

## 2021-01-01 RX ORDER — NYSTATIN 100000 U/G
1 CREAM TOPICAL DAILY
COMMUNITY

## 2021-01-01 RX ORDER — CALCIUM GLUCONATE 20 MG/ML
2 INJECTION, SOLUTION INTRAVENOUS ONCE
Status: COMPLETED | OUTPATIENT
Start: 2021-01-01 | End: 2021-01-01

## 2021-01-01 RX ORDER — ONDANSETRON 4 MG/1
4 TABLET, FILM COATED ORAL EVERY 6 HOURS PRN
COMMUNITY

## 2021-01-01 RX ORDER — ASPIRIN 81 MG/1
81 TABLET ORAL DAILY
COMMUNITY

## 2021-01-01 RX ORDER — POTASSIUM CHLORIDE 20 MEQ/1
40 TABLET, EXTENDED RELEASE ORAL DAILY
COMMUNITY
End: 2021-01-01 | Stop reason: HOSPADM

## 2021-01-01 RX ORDER — DEXTROSE, SODIUM CHLORIDE, SODIUM LACTATE, POTASSIUM CHLORIDE, AND CALCIUM CHLORIDE 5; .6; .31; .03; .02 G/100ML; G/100ML; G/100ML; G/100ML; G/100ML
250 INJECTION, SOLUTION INTRAVENOUS CONTINUOUS
Status: DISCONTINUED | OUTPATIENT
Start: 2021-01-01 | End: 2021-01-01

## 2021-01-01 RX ORDER — SIMETHICONE 80 MG
80 TABLET,CHEWABLE ORAL EVERY 6 HOURS PRN
Status: DISCONTINUED | OUTPATIENT
Start: 2021-01-01 | End: 2021-01-01

## 2021-01-01 RX ORDER — INSULIN GLARGINE 100 [IU]/ML
5 INJECTION, SOLUTION SUBCUTANEOUS
COMMUNITY

## 2021-01-01 RX ORDER — LATANOPROST 50 UG/ML
1 SOLUTION/ DROPS OPHTHALMIC
COMMUNITY

## 2021-01-01 RX ORDER — ALBUMIN (HUMAN) 12.5 G/50ML
25 SOLUTION INTRAVENOUS ONCE
Status: COMPLETED | OUTPATIENT
Start: 2021-01-01 | End: 2021-01-01

## 2021-01-01 RX ORDER — GENTAMICIN SULFATE 1 MG/G
1 CREAM TOPICAL DAILY
Status: DISCONTINUED | OUTPATIENT
Start: 2021-01-01 | End: 2021-01-01 | Stop reason: HOSPADM

## 2021-01-01 RX ORDER — CALCITRIOL 0.25 UG/1
0.25 CAPSULE, LIQUID FILLED ORAL DAILY
COMMUNITY

## 2021-01-01 RX ORDER — ASPIRIN 81 MG/1
81 TABLET ORAL DAILY
Status: DISCONTINUED | OUTPATIENT
Start: 2021-01-01 | End: 2021-01-01 | Stop reason: HOSPADM

## 2021-01-01 RX ORDER — PANTOPRAZOLE SODIUM 40 MG/1
40 TABLET, DELAYED RELEASE ORAL
Status: DISCONTINUED | OUTPATIENT
Start: 2021-01-01 | End: 2021-01-01 | Stop reason: HOSPADM

## 2021-01-01 RX ORDER — SODIUM CHLORIDE 450 MG/100ML
250 INJECTION, SOLUTION INTRAVENOUS CONTINUOUS
Status: DISCONTINUED | OUTPATIENT
Start: 2021-01-01 | End: 2021-01-01

## 2021-01-01 RX ADMIN — HEPARIN SODIUM 5000 UNITS: 5000 INJECTION INTRAVENOUS; SUBCUTANEOUS at 22:15

## 2021-01-01 RX ADMIN — SODIUM CHLORIDE 2 UNITS/HR: 9 INJECTION, SOLUTION INTRAVENOUS at 03:32

## 2021-01-01 RX ADMIN — CHOLESTYRAMINE 4 G: 4 POWDER, FOR SUSPENSION ORAL at 17:30

## 2021-01-01 RX ADMIN — LATANOPROST 1 DROP: 50 SOLUTION OPHTHALMIC at 21:02

## 2021-01-01 RX ADMIN — DEXTROSE 200 ML/HR: 5 SOLUTION INTRAVENOUS at 10:43

## 2021-01-01 RX ADMIN — HEPARIN SODIUM 5000 UNITS: 5000 INJECTION INTRAVENOUS; SUBCUTANEOUS at 05:38

## 2021-01-01 RX ADMIN — INSULIN LISPRO 8 UNITS: 100 INJECTION, SOLUTION INTRAVENOUS; SUBCUTANEOUS at 11:53

## 2021-01-01 RX ADMIN — METOPROLOL SUCCINATE 25 MG: 25 TABLET, EXTENDED RELEASE ORAL at 11:36

## 2021-01-01 RX ADMIN — NEPHROCAP 1 CAPSULE: 1 CAP ORAL at 15:38

## 2021-01-01 RX ADMIN — GENTAMICIN SULFATE 1 APPLICATION: 1 CREAM TOPICAL at 09:55

## 2021-01-01 RX ADMIN — INSULIN LISPRO 2 UNITS: 100 INJECTION, SOLUTION INTRAVENOUS; SUBCUTANEOUS at 22:36

## 2021-01-01 RX ADMIN — HEPARIN SODIUM 5000 UNITS: 5000 INJECTION INTRAVENOUS; SUBCUTANEOUS at 14:26

## 2021-01-01 RX ADMIN — SACUBITRIL AND VALSARTAN 1 TABLET: 24; 26 TABLET, FILM COATED ORAL at 08:13

## 2021-01-01 RX ADMIN — METOPROLOL SUCCINATE 50 MG: 50 TABLET, EXTENDED RELEASE ORAL at 09:46

## 2021-01-01 RX ADMIN — LOSARTAN POTASSIUM 50 MG: 50 TABLET, FILM COATED ORAL at 08:18

## 2021-01-01 RX ADMIN — ATORVASTATIN CALCIUM 40 MG: 40 TABLET, FILM COATED ORAL at 09:46

## 2021-01-01 RX ADMIN — PERPHENAZINE 4 MG: 4 TABLET, FILM COATED ORAL at 22:35

## 2021-01-01 RX ADMIN — HEPARIN SODIUM 5000 UNITS: 5000 INJECTION INTRAVENOUS; SUBCUTANEOUS at 12:51

## 2021-01-01 RX ADMIN — POTASSIUM CHLORIDE 20 MEQ: 14.9 INJECTION, SOLUTION INTRAVENOUS at 09:55

## 2021-01-01 RX ADMIN — CLOPIDOGREL 75 MG: 75 TABLET, FILM COATED ORAL at 08:04

## 2021-01-01 RX ADMIN — PERPHENAZINE 4 MG: 4 TABLET, FILM COATED ORAL at 22:31

## 2021-01-01 RX ADMIN — GENTAMICIN SULFATE 1 APPLICATION: 1 CREAM TOPICAL at 08:45

## 2021-01-01 RX ADMIN — ATORVASTATIN CALCIUM 40 MG: 40 TABLET, FILM COATED ORAL at 09:10

## 2021-01-01 RX ADMIN — PERPHENAZINE 4 MG: 4 TABLET, FILM COATED ORAL at 08:27

## 2021-01-01 RX ADMIN — HEPARIN SODIUM 5000 UNITS: 5000 INJECTION INTRAVENOUS; SUBCUTANEOUS at 23:32

## 2021-01-01 RX ADMIN — METOPROLOL SUCCINATE 50 MG: 50 TABLET, EXTENDED RELEASE ORAL at 08:27

## 2021-01-01 RX ADMIN — CHOLESTYRAMINE 4 G: 4 POWDER, FOR SUSPENSION ORAL at 12:51

## 2021-01-01 RX ADMIN — HEPARIN SODIUM 5000 UNITS: 5000 INJECTION INTRAVENOUS; SUBCUTANEOUS at 06:59

## 2021-01-01 RX ADMIN — SACUBITRIL AND VALSARTAN 1 TABLET: 24; 26 TABLET, FILM COATED ORAL at 17:05

## 2021-01-01 RX ADMIN — LATANOPROST 1 DROP: 50 SOLUTION OPHTHALMIC at 22:15

## 2021-01-01 RX ADMIN — GENTAMICIN SULFATE 1 APPLICATION: 1 CREAM TOPICAL at 08:13

## 2021-01-01 RX ADMIN — SACUBITRIL AND VALSARTAN 1 TABLET: 24; 26 TABLET, FILM COATED ORAL at 17:30

## 2021-01-01 RX ADMIN — AMLODIPINE BESYLATE 5 MG: 5 TABLET ORAL at 08:27

## 2021-01-01 RX ADMIN — PANTOPRAZOLE SODIUM 40 MG: 40 TABLET, DELAYED RELEASE ORAL at 05:22

## 2021-01-01 RX ADMIN — GENTAMICIN SULFATE 1 APPLICATION: 1 CREAM TOPICAL at 10:08

## 2021-01-01 RX ADMIN — MIDODRINE HYDROCHLORIDE 5 MG: 5 TABLET ORAL at 10:42

## 2021-01-01 RX ADMIN — ATORVASTATIN CALCIUM 40 MG: 40 TABLET, FILM COATED ORAL at 09:54

## 2021-01-01 RX ADMIN — INSULIN LISPRO 2 UNITS: 100 INJECTION, SOLUTION INTRAVENOUS; SUBCUTANEOUS at 07:50

## 2021-01-01 RX ADMIN — INSULIN LISPRO 1 UNITS: 100 INJECTION, SOLUTION INTRAVENOUS; SUBCUTANEOUS at 22:17

## 2021-01-01 RX ADMIN — CHOLESTYRAMINE 4 G: 4 POWDER, FOR SUSPENSION ORAL at 09:46

## 2021-01-01 RX ADMIN — ATORVASTATIN CALCIUM 40 MG: 40 TABLET, FILM COATED ORAL at 08:27

## 2021-01-01 RX ADMIN — FENTANYL CITRATE 25 MCG: 50 INJECTION, SOLUTION INTRAMUSCULAR; INTRAVENOUS at 08:29

## 2021-01-01 RX ADMIN — SERTRALINE 25 MG: 25 TABLET, FILM COATED ORAL at 08:32

## 2021-01-01 RX ADMIN — CALCITRIOL 0.25 MCG: 0.25 CAPSULE, LIQUID FILLED ORAL at 09:42

## 2021-01-01 RX ADMIN — Medication 2000 UNITS: at 09:45

## 2021-01-01 RX ADMIN — Medication 2000 UNITS: at 09:10

## 2021-01-01 RX ADMIN — PERPHENAZINE 4 MG: 4 TABLET, FILM COATED ORAL at 22:17

## 2021-01-01 RX ADMIN — Medication 2000 UNITS: at 08:27

## 2021-01-01 RX ADMIN — PERPHENAZINE 4 MG: 4 TABLET, FILM COATED ORAL at 17:31

## 2021-01-01 RX ADMIN — PERPHENAZINE 4 MG: 4 TABLET, FILM COATED ORAL at 17:08

## 2021-01-01 RX ADMIN — INSULIN LISPRO 2 UNITS: 100 INJECTION, SOLUTION INTRAVENOUS; SUBCUTANEOUS at 22:14

## 2021-01-01 RX ADMIN — PANTOPRAZOLE SODIUM 40 MG: 40 TABLET, DELAYED RELEASE ORAL at 05:09

## 2021-01-01 RX ADMIN — HEPARIN SODIUM 5000 UNITS: 5000 INJECTION INTRAVENOUS; SUBCUTANEOUS at 13:43

## 2021-01-01 RX ADMIN — ALBUMIN (HUMAN) 12.5 G: 12.5 INJECTION, SOLUTION INTRAVENOUS at 16:59

## 2021-01-01 RX ADMIN — ACETAMINOPHEN 975 MG: 325 TABLET, FILM COATED ORAL at 06:36

## 2021-01-01 RX ADMIN — SACUBITRIL AND VALSARTAN 1 TABLET: 24; 26 TABLET, FILM COATED ORAL at 08:54

## 2021-01-01 RX ADMIN — Medication 2000 UNITS: at 11:49

## 2021-01-01 RX ADMIN — INSULIN GLARGINE 10 UNITS: 100 INJECTION, SOLUTION SUBCUTANEOUS at 22:36

## 2021-01-01 RX ADMIN — ONDANSETRON 4 MG: 2 INJECTION INTRAMUSCULAR; INTRAVENOUS at 09:59

## 2021-01-01 RX ADMIN — HEPARIN SODIUM 5000 UNITS: 5000 INJECTION INTRAVENOUS; SUBCUTANEOUS at 13:00

## 2021-01-01 RX ADMIN — ATORVASTATIN CALCIUM 40 MG: 40 TABLET, FILM COATED ORAL at 16:52

## 2021-01-01 RX ADMIN — SERTRALINE 25 MG: 25 TABLET, FILM COATED ORAL at 08:44

## 2021-01-01 RX ADMIN — SACUBITRIL AND VALSARTAN 1 TABLET: 24; 26 TABLET, FILM COATED ORAL at 17:01

## 2021-01-01 RX ADMIN — SERTRALINE 25 MG: 25 TABLET, FILM COATED ORAL at 09:08

## 2021-01-01 RX ADMIN — GENTAMICIN SULFATE 1 APPLICATION: 1 CREAM TOPICAL at 08:28

## 2021-01-01 RX ADMIN — PERPHENAZINE 4 MG: 4 TABLET, FILM COATED ORAL at 17:05

## 2021-01-01 RX ADMIN — PERPHENAZINE 4 MG: 4 TABLET, FILM COATED ORAL at 12:27

## 2021-01-01 RX ADMIN — SERTRALINE 25 MG: 25 TABLET, FILM COATED ORAL at 09:54

## 2021-01-01 RX ADMIN — NEPHROCAP 1 CAPSULE: 1 CAP ORAL at 17:01

## 2021-01-01 RX ADMIN — GENTAMICIN SULFATE 1 APPLICATION: 1 CREAM TOPICAL at 09:12

## 2021-01-01 RX ADMIN — HEPARIN SODIUM 5000 UNITS: 5000 INJECTION INTRAVENOUS; SUBCUTANEOUS at 07:32

## 2021-01-01 RX ADMIN — ASPIRIN 81 MG: 81 TABLET, COATED ORAL at 12:50

## 2021-01-01 RX ADMIN — POTASSIUM CHLORIDE 20 MEQ: 14.9 INJECTION, SOLUTION INTRAVENOUS at 13:47

## 2021-01-01 RX ADMIN — PERPHENAZINE 4 MG: 4 TABLET, FILM COATED ORAL at 17:10

## 2021-01-01 RX ADMIN — DEXTROSE 100 ML/HR: 5 SOLUTION INTRAVENOUS at 22:48

## 2021-01-01 RX ADMIN — SACUBITRIL AND VALSARTAN 1 TABLET: 24; 26 TABLET, FILM COATED ORAL at 08:46

## 2021-01-01 RX ADMIN — HEPARIN SODIUM 5000 UNITS: 5000 INJECTION INTRAVENOUS; SUBCUTANEOUS at 12:43

## 2021-01-01 RX ADMIN — HEPARIN SODIUM 5000 UNITS: 5000 INJECTION INTRAVENOUS; SUBCUTANEOUS at 22:08

## 2021-01-01 RX ADMIN — CALCITRIOL 0.25 MCG: 0.25 CAPSULE, LIQUID FILLED ORAL at 08:28

## 2021-01-01 RX ADMIN — INSULIN LISPRO 1 UNITS: 100 INJECTION, SOLUTION INTRAVENOUS; SUBCUTANEOUS at 11:54

## 2021-01-01 RX ADMIN — PERPHENAZINE 4 MG: 4 TABLET, FILM COATED ORAL at 21:30

## 2021-01-01 RX ADMIN — ASPIRIN 81 MG: 81 TABLET, COATED ORAL at 08:44

## 2021-01-01 RX ADMIN — ATORVASTATIN CALCIUM 40 MG: 40 TABLET, FILM COATED ORAL at 08:54

## 2021-01-01 RX ADMIN — SACUBITRIL AND VALSARTAN 1 TABLET: 24; 26 TABLET, FILM COATED ORAL at 17:12

## 2021-01-01 RX ADMIN — HEPARIN SODIUM 5000 UNITS: 5000 INJECTION INTRAVENOUS; SUBCUTANEOUS at 14:00

## 2021-01-01 RX ADMIN — CLOPIDOGREL 75 MG: 75 TABLET, FILM COATED ORAL at 08:11

## 2021-01-01 RX ADMIN — HEPARIN SODIUM 5000 UNITS: 5000 INJECTION INTRAVENOUS; SUBCUTANEOUS at 13:46

## 2021-01-01 RX ADMIN — ATORVASTATIN CALCIUM 40 MG: 40 TABLET, FILM COATED ORAL at 08:10

## 2021-01-01 RX ADMIN — PERPHENAZINE 4 MG: 4 TABLET, FILM COATED ORAL at 22:07

## 2021-01-01 RX ADMIN — INSULIN GLARGINE 10 UNITS: 100 INJECTION, SOLUTION SUBCUTANEOUS at 22:28

## 2021-01-01 RX ADMIN — HEPARIN SODIUM 5000 UNITS: 5000 INJECTION INTRAVENOUS; SUBCUTANEOUS at 16:59

## 2021-01-01 RX ADMIN — PANTOPRAZOLE SODIUM 40 MG: 40 TABLET, DELAYED RELEASE ORAL at 06:39

## 2021-01-01 RX ADMIN — ASPIRIN 81 MG: 81 TABLET, COATED ORAL at 08:10

## 2021-01-01 RX ADMIN — INSULIN LISPRO 1 UNITS: 100 INJECTION, SOLUTION INTRAVENOUS; SUBCUTANEOUS at 14:09

## 2021-01-01 RX ADMIN — INSULIN LISPRO 4 UNITS: 100 INJECTION, SOLUTION INTRAVENOUS; SUBCUTANEOUS at 17:06

## 2021-01-01 RX ADMIN — DEXTROSE MONOHYDRATE 50 ML: 25 INJECTION, SOLUTION INTRAVENOUS at 21:02

## 2021-01-01 RX ADMIN — LATANOPROST 1 DROP: 50 SOLUTION OPHTHALMIC at 22:09

## 2021-01-01 RX ADMIN — PERPHENAZINE 4 MG: 4 TABLET, FILM COATED ORAL at 23:31

## 2021-01-01 RX ADMIN — CHOLESTYRAMINE 4 G: 4 POWDER, FOR SUSPENSION ORAL at 17:24

## 2021-01-01 RX ADMIN — Medication 2000 UNITS: at 08:44

## 2021-01-01 RX ADMIN — SODIUM CHLORIDE 1000 ML: 0.9 INJECTION, SOLUTION INTRAVENOUS at 18:44

## 2021-01-01 RX ADMIN — DEXTROSE 100 ML/HR: 5 SOLUTION INTRAVENOUS at 13:42

## 2021-01-01 RX ADMIN — ASPIRIN 81 MG: 81 TABLET, COATED ORAL at 08:18

## 2021-01-01 RX ADMIN — INSULIN LISPRO 1 UNITS: 100 INJECTION, SOLUTION INTRAVENOUS; SUBCUTANEOUS at 17:25

## 2021-01-01 RX ADMIN — LATANOPROST 1 DROP: 50 SOLUTION OPHTHALMIC at 23:09

## 2021-01-01 RX ADMIN — LATANOPROST 1 DROP: 50 SOLUTION OPHTHALMIC at 21:11

## 2021-01-01 RX ADMIN — PERPHENAZINE 4 MG: 4 TABLET, FILM COATED ORAL at 17:48

## 2021-01-01 RX ADMIN — ASPIRIN 81 MG: 81 TABLET, COATED ORAL at 08:27

## 2021-01-01 RX ADMIN — Medication 2000 UNITS: at 08:11

## 2021-01-01 RX ADMIN — PERFLUTREN 0.8 ML/MIN: 6.52 INJECTION, SUSPENSION INTRAVENOUS at 09:51

## 2021-01-01 RX ADMIN — INSULIN GLARGINE 8 UNITS: 100 INJECTION, SOLUTION SUBCUTANEOUS at 21:10

## 2021-01-01 RX ADMIN — SODIUM CHLORIDE 5 UNITS/HR: 9 INJECTION, SOLUTION INTRAVENOUS at 05:52

## 2021-01-01 RX ADMIN — GENTAMICIN SULFATE 1 APPLICATION: 1 CREAM TOPICAL at 09:08

## 2021-01-01 RX ADMIN — SACUBITRIL AND VALSARTAN 1 TABLET: 24; 26 TABLET, FILM COATED ORAL at 17:04

## 2021-01-01 RX ADMIN — SACUBITRIL AND VALSARTAN 1 TABLET: 24; 26 TABLET, FILM COATED ORAL at 17:18

## 2021-01-01 RX ADMIN — INSULIN LISPRO 1 UNITS: 100 INJECTION, SOLUTION INTRAVENOUS; SUBCUTANEOUS at 14:00

## 2021-01-01 RX ADMIN — CALCITRIOL 0.25 MCG: 0.25 CAPSULE, LIQUID FILLED ORAL at 08:19

## 2021-01-01 RX ADMIN — PERPHENAZINE 4 MG: 4 TABLET, FILM COATED ORAL at 22:20

## 2021-01-01 RX ADMIN — SACUBITRIL AND VALSARTAN 1 TABLET: 24; 26 TABLET, FILM COATED ORAL at 08:27

## 2021-01-01 RX ADMIN — HEPARIN SODIUM 5000 UNITS: 5000 INJECTION INTRAVENOUS; SUBCUTANEOUS at 23:09

## 2021-01-01 RX ADMIN — INSULIN LISPRO 5 UNITS: 100 INJECTION, SOLUTION INTRAVENOUS; SUBCUTANEOUS at 09:08

## 2021-01-01 RX ADMIN — PANTOPRAZOLE SODIUM 40 MG: 40 TABLET, DELAYED RELEASE ORAL at 05:29

## 2021-01-01 RX ADMIN — INSULIN LISPRO 4 UNITS: 100 INJECTION, SOLUTION INTRAVENOUS; SUBCUTANEOUS at 22:14

## 2021-01-01 RX ADMIN — PANTOPRAZOLE SODIUM 40 MG: 40 TABLET, DELAYED RELEASE ORAL at 05:14

## 2021-01-01 RX ADMIN — PERPHENAZINE 4 MG: 4 TABLET, FILM COATED ORAL at 08:32

## 2021-01-01 RX ADMIN — INSULIN LISPRO 2 UNITS: 100 INJECTION, SOLUTION INTRAVENOUS; SUBCUTANEOUS at 13:14

## 2021-01-01 RX ADMIN — SACUBITRIL AND VALSARTAN 1 TABLET: 24; 26 TABLET, FILM COATED ORAL at 09:12

## 2021-01-01 RX ADMIN — SERTRALINE 25 MG: 25 TABLET, FILM COATED ORAL at 08:18

## 2021-01-01 RX ADMIN — Medication 2000 UNITS: at 09:08

## 2021-01-01 RX ADMIN — HEPARIN SODIUM 5000 UNITS: 5000 INJECTION INTRAVENOUS; SUBCUTANEOUS at 05:29

## 2021-01-01 RX ADMIN — INSULIN LISPRO 4 UNITS: 100 INJECTION, SOLUTION INTRAVENOUS; SUBCUTANEOUS at 13:02

## 2021-01-01 RX ADMIN — INSULIN LISPRO 4 UNITS: 100 INJECTION, SOLUTION INTRAVENOUS; SUBCUTANEOUS at 07:50

## 2021-01-01 RX ADMIN — PANTOPRAZOLE SODIUM 40 MG: 40 TABLET, DELAYED RELEASE ORAL at 06:36

## 2021-01-01 RX ADMIN — LATANOPROST 1 DROP: 50 SOLUTION OPHTHALMIC at 22:06

## 2021-01-01 RX ADMIN — GENTAMICIN SULFATE 1 APPLICATION: 1 CREAM TOPICAL at 16:55

## 2021-01-01 RX ADMIN — SACUBITRIL AND VALSARTAN 1 TABLET: 24; 26 TABLET, FILM COATED ORAL at 17:26

## 2021-01-01 RX ADMIN — PERPHENAZINE 4 MG: 4 TABLET, FILM COATED ORAL at 09:12

## 2021-01-01 RX ADMIN — SACUBITRIL AND VALSARTAN 1 TABLET: 24; 26 TABLET, FILM COATED ORAL at 17:09

## 2021-01-01 RX ADMIN — LATANOPROST 1 DROP: 50 SOLUTION OPHTHALMIC at 22:16

## 2021-01-01 RX ADMIN — INSULIN GLARGINE 14 UNITS: 100 INJECTION, SOLUTION SUBCUTANEOUS at 22:15

## 2021-01-01 RX ADMIN — PERPHENAZINE 4 MG: 4 TABLET, FILM COATED ORAL at 08:13

## 2021-01-01 RX ADMIN — SACUBITRIL AND VALSARTAN 1 TABLET: 24; 26 TABLET, FILM COATED ORAL at 08:32

## 2021-01-01 RX ADMIN — PANTOPRAZOLE SODIUM 40 MG: 40 TABLET, DELAYED RELEASE ORAL at 05:58

## 2021-01-01 RX ADMIN — PANTOPRAZOLE SODIUM 40 MG: 40 TABLET, DELAYED RELEASE ORAL at 11:49

## 2021-01-01 RX ADMIN — HEPARIN SODIUM 5000 UNITS: 5000 INJECTION INTRAVENOUS; SUBCUTANEOUS at 14:25

## 2021-01-01 RX ADMIN — NEPHROCAP 1 CAPSULE: 1 CAP ORAL at 17:09

## 2021-01-01 RX ADMIN — POTASSIUM CHLORIDE 20 MEQ: 14.9 INJECTION, SOLUTION INTRAVENOUS at 22:18

## 2021-01-01 RX ADMIN — LATANOPROST 1 DROP: 50 SOLUTION OPHTHALMIC at 22:07

## 2021-01-01 RX ADMIN — METOPROLOL SUCCINATE 50 MG: 50 TABLET, EXTENDED RELEASE ORAL at 08:54

## 2021-01-01 RX ADMIN — INSULIN HUMAN 10 UNITS: 100 INJECTION, SOLUTION PARENTERAL at 20:44

## 2021-01-01 RX ADMIN — POTASSIUM CHLORIDE 20 MEQ: 14.9 INJECTION, SOLUTION INTRAVENOUS at 20:33

## 2021-01-01 RX ADMIN — ACETAMINOPHEN 650 MG: 325 TABLET, FILM COATED ORAL at 21:26

## 2021-01-01 RX ADMIN — HEPARIN SODIUM 5000 UNITS: 5000 INJECTION INTRAVENOUS; SUBCUTANEOUS at 05:46

## 2021-01-01 RX ADMIN — HEPARIN SODIUM 5000 UNITS: 5000 INJECTION INTRAVENOUS; SUBCUTANEOUS at 13:26

## 2021-01-01 RX ADMIN — INSULIN LISPRO 1 UNITS: 100 INJECTION, SOLUTION INTRAVENOUS; SUBCUTANEOUS at 13:13

## 2021-01-01 RX ADMIN — LOSARTAN POTASSIUM 50 MG: 50 TABLET, FILM COATED ORAL at 23:09

## 2021-01-01 RX ADMIN — DEXTROSE 100 ML/HR: 5 SOLUTION INTRAVENOUS at 17:21

## 2021-01-01 RX ADMIN — SODIUM CHLORIDE, SODIUM GLUCONATE, SODIUM ACETATE, POTASSIUM CHLORIDE, MAGNESIUM CHLORIDE, SODIUM PHOSPHATE, DIBASIC, AND POTASSIUM PHOSPHATE 250 ML/HR: .53; .5; .37; .037; .03; .012; .00082 INJECTION, SOLUTION INTRAVENOUS at 07:52

## 2021-01-01 RX ADMIN — ATORVASTATIN CALCIUM 40 MG: 40 TABLET, FILM COATED ORAL at 12:50

## 2021-01-01 RX ADMIN — SACUBITRIL AND VALSARTAN 1 TABLET: 24; 26 TABLET, FILM COATED ORAL at 09:46

## 2021-01-01 RX ADMIN — POTASSIUM CHLORIDE 40 MEQ: 20 SOLUTION ORAL at 10:45

## 2021-01-01 RX ADMIN — INSULIN GLARGINE 14 UNITS: 100 INJECTION, SOLUTION SUBCUTANEOUS at 22:52

## 2021-01-01 RX ADMIN — GENTAMICIN SULFATE 1 APPLICATION: 1 CREAM TOPICAL at 08:33

## 2021-01-01 RX ADMIN — INSULIN GLARGINE 14 UNITS: 100 INJECTION, SOLUTION SUBCUTANEOUS at 22:14

## 2021-01-01 RX ADMIN — METOPROLOL SUCCINATE 50 MG: 50 TABLET, EXTENDED RELEASE ORAL at 09:34

## 2021-01-01 RX ADMIN — POTASSIUM CHLORIDE 20 MEQ: 14.9 INJECTION, SOLUTION INTRAVENOUS at 09:47

## 2021-01-01 RX ADMIN — NEPHROCAP 1 CAPSULE: 1 CAP ORAL at 17:30

## 2021-01-01 RX ADMIN — PERPHENAZINE 4 MG: 4 TABLET, FILM COATED ORAL at 08:12

## 2021-01-01 RX ADMIN — ONDANSETRON 4 MG: 2 INJECTION INTRAMUSCULAR; INTRAVENOUS at 13:07

## 2021-01-01 RX ADMIN — CLOPIDOGREL 75 MG: 75 TABLET, FILM COATED ORAL at 09:10

## 2021-01-01 RX ADMIN — INSULIN LISPRO 4 UNITS: 100 INJECTION, SOLUTION INTRAVENOUS; SUBCUTANEOUS at 11:44

## 2021-01-01 RX ADMIN — PERPHENAZINE 4 MG: 4 TABLET, FILM COATED ORAL at 15:38

## 2021-01-01 RX ADMIN — ATORVASTATIN CALCIUM 40 MG: 40 TABLET, FILM COATED ORAL at 12:26

## 2021-01-01 RX ADMIN — HEPARIN SODIUM 5000 UNITS: 5000 INJECTION INTRAVENOUS; SUBCUTANEOUS at 05:09

## 2021-01-01 RX ADMIN — PERPHENAZINE 4 MG: 4 TABLET, FILM COATED ORAL at 09:09

## 2021-01-01 RX ADMIN — POTASSIUM CHLORIDE 20 MEQ: 14.9 INJECTION, SOLUTION INTRAVENOUS at 12:38

## 2021-01-01 RX ADMIN — ATORVASTATIN CALCIUM 40 MG: 40 TABLET, FILM COATED ORAL at 08:12

## 2021-01-01 RX ADMIN — NEPHROCAP 1 CAPSULE: 1 CAP ORAL at 17:04

## 2021-01-01 RX ADMIN — Medication 2000 UNITS: at 09:54

## 2021-01-01 RX ADMIN — HEPARIN SODIUM 5000 UNITS: 5000 INJECTION INTRAVENOUS; SUBCUTANEOUS at 05:57

## 2021-01-01 RX ADMIN — LATANOPROST 1 DROP: 50 SOLUTION OPHTHALMIC at 23:29

## 2021-01-01 RX ADMIN — NEPHROCAP 1 CAPSULE: 1 CAP ORAL at 17:08

## 2021-01-01 RX ADMIN — LATANOPROST 1 DROP: 50 SOLUTION OPHTHALMIC at 22:56

## 2021-01-01 RX ADMIN — HEPARIN SODIUM 5000 UNITS: 5000 INJECTION INTRAVENOUS; SUBCUTANEOUS at 22:51

## 2021-01-01 RX ADMIN — PERPHENAZINE 4 MG: 4 TABLET, FILM COATED ORAL at 22:15

## 2021-01-01 RX ADMIN — LOSARTAN POTASSIUM 50 MG: 50 TABLET, FILM COATED ORAL at 11:50

## 2021-01-01 RX ADMIN — PERPHENAZINE 4 MG: 4 TABLET, FILM COATED ORAL at 09:55

## 2021-01-01 RX ADMIN — SERTRALINE 25 MG: 25 TABLET, FILM COATED ORAL at 08:26

## 2021-01-01 RX ADMIN — ALTEPLASE 2 MG: 2.2 INJECTION, POWDER, LYOPHILIZED, FOR SOLUTION INTRAVENOUS at 10:27

## 2021-01-01 RX ADMIN — HEPARIN SODIUM 5000 UNITS: 5000 INJECTION INTRAVENOUS; SUBCUTANEOUS at 14:36

## 2021-01-01 RX ADMIN — METOPROLOL SUCCINATE 50 MG: 50 TABLET, EXTENDED RELEASE ORAL at 12:53

## 2021-01-01 RX ADMIN — CLOPIDOGREL BISULFATE 75 MG: 75 TABLET ORAL at 11:50

## 2021-01-01 RX ADMIN — ASPIRIN 81 MG: 81 TABLET, COATED ORAL at 16:52

## 2021-01-01 RX ADMIN — HEPARIN SODIUM 5000 UNITS: 5000 INJECTION INTRAVENOUS; SUBCUTANEOUS at 05:22

## 2021-01-01 RX ADMIN — INSULIN LISPRO 2 UNITS: 100 INJECTION, SOLUTION INTRAVENOUS; SUBCUTANEOUS at 09:47

## 2021-01-01 RX ADMIN — INSULIN GLARGINE 7 UNITS: 100 INJECTION, SOLUTION SUBCUTANEOUS at 22:25

## 2021-01-01 RX ADMIN — HEPARIN SODIUM 5000 UNITS: 5000 INJECTION INTRAVENOUS; SUBCUTANEOUS at 05:48

## 2021-01-01 RX ADMIN — Medication 2000 UNITS: at 08:54

## 2021-01-01 RX ADMIN — ALBUMIN (HUMAN) 25 G: 0.25 INJECTION, SOLUTION INTRAVENOUS at 10:03

## 2021-01-01 RX ADMIN — CLOPIDOGREL 75 MG: 75 TABLET, FILM COATED ORAL at 16:53

## 2021-01-01 RX ADMIN — ACETAMINOPHEN 975 MG: 325 TABLET, FILM COATED ORAL at 05:29

## 2021-01-01 RX ADMIN — HEPARIN SODIUM 5000 UNITS: 5000 INJECTION INTRAVENOUS; SUBCUTANEOUS at 05:51

## 2021-01-01 RX ADMIN — GENTAMICIN SULFATE 1 APPLICATION: 1 CREAM TOPICAL at 12:28

## 2021-01-01 RX ADMIN — CHOLESTYRAMINE 4 G: 4 POWDER, FOR SUSPENSION ORAL at 17:12

## 2021-01-01 RX ADMIN — ASPIRIN 81 MG: 81 TABLET, COATED ORAL at 08:32

## 2021-01-01 RX ADMIN — HEPARIN SODIUM 5000 UNITS: 5000 INJECTION INTRAVENOUS; SUBCUTANEOUS at 13:04

## 2021-01-01 RX ADMIN — PANTOPRAZOLE SODIUM 40 MG: 40 TABLET, DELAYED RELEASE ORAL at 05:50

## 2021-01-01 RX ADMIN — ACETAMINOPHEN 975 MG: 325 TABLET, FILM COATED ORAL at 22:56

## 2021-01-01 RX ADMIN — PERPHENAZINE 4 MG: 4 TABLET, FILM COATED ORAL at 16:04

## 2021-01-01 RX ADMIN — CHOLESTYRAMINE 4 G: 4 POWDER, FOR SUSPENSION ORAL at 08:26

## 2021-01-01 RX ADMIN — CHOLESTYRAMINE 4 G: 4 POWDER, FOR SUSPENSION ORAL at 09:34

## 2021-01-01 RX ADMIN — HEPARIN SODIUM 5000 UNITS: 5000 INJECTION INTRAVENOUS; SUBCUTANEOUS at 22:56

## 2021-01-01 RX ADMIN — CHOLESTYRAMINE 4 G: 4 POWDER, FOR SUSPENSION ORAL at 08:54

## 2021-01-01 RX ADMIN — HEPARIN SODIUM 5000 UNITS: 5000 INJECTION INTRAVENOUS; SUBCUTANEOUS at 05:07

## 2021-01-01 RX ADMIN — PERPHENAZINE 4 MG: 4 TABLET, FILM COATED ORAL at 22:05

## 2021-01-01 RX ADMIN — PERPHENAZINE 4 MG: 4 TABLET, FILM COATED ORAL at 08:20

## 2021-01-01 RX ADMIN — HEPARIN SODIUM 5000 UNITS: 5000 INJECTION INTRAVENOUS; SUBCUTANEOUS at 22:37

## 2021-01-01 RX ADMIN — SODIUM CHLORIDE 250 ML/HR: 0.45 INJECTION, SOLUTION INTRAVENOUS at 09:46

## 2021-01-01 RX ADMIN — CLOPIDOGREL BISULFATE 75 MG: 75 TABLET ORAL at 08:27

## 2021-01-01 RX ADMIN — SERTRALINE 25 MG: 25 TABLET, FILM COATED ORAL at 11:49

## 2021-01-01 RX ADMIN — IOHEXOL 85 ML: 350 INJECTION, SOLUTION INTRAVENOUS at 10:11

## 2021-01-01 RX ADMIN — INSULIN LISPRO 1 UNITS: 100 INJECTION, SOLUTION INTRAVENOUS; SUBCUTANEOUS at 12:31

## 2021-01-01 RX ADMIN — METOPROLOL SUCCINATE 50 MG: 50 TABLET, EXTENDED RELEASE ORAL at 08:11

## 2021-01-01 RX ADMIN — INSULIN LISPRO 4 UNITS: 100 INJECTION, SOLUTION INTRAVENOUS; SUBCUTANEOUS at 09:10

## 2021-01-01 RX ADMIN — GENTAMICIN SULFATE 1 APPLICATION: 1 CREAM TOPICAL at 08:54

## 2021-01-01 RX ADMIN — SODIUM CHLORIDE 11.6 UNITS/HR: 9 INJECTION, SOLUTION INTRAVENOUS at 09:59

## 2021-01-01 RX ADMIN — PERPHENAZINE 4 MG: 4 TABLET, FILM COATED ORAL at 17:18

## 2021-01-01 RX ADMIN — INSULIN LISPRO 1 UNITS: 100 INJECTION, SOLUTION INTRAVENOUS; SUBCUTANEOUS at 21:11

## 2021-01-01 RX ADMIN — CALCITRIOL 0.25 MCG: 0.25 CAPSULE, LIQUID FILLED ORAL at 08:32

## 2021-01-01 RX ADMIN — SACUBITRIL AND VALSARTAN 1 TABLET: 24; 26 TABLET, FILM COATED ORAL at 22:07

## 2021-01-01 RX ADMIN — ASPIRIN 81 MG: 81 TABLET, COATED ORAL at 09:46

## 2021-01-01 RX ADMIN — CALCITRIOL 0.25 MCG: 0.25 CAPSULE, LIQUID FILLED ORAL at 09:11

## 2021-01-01 RX ADMIN — CLOPIDOGREL 75 MG: 75 TABLET, FILM COATED ORAL at 08:44

## 2021-01-01 RX ADMIN — NEPHROCAP 1 CAPSULE: 1 CAP ORAL at 17:24

## 2021-01-01 RX ADMIN — CLOPIDOGREL BISULFATE 75 MG: 75 TABLET ORAL at 08:18

## 2021-01-01 RX ADMIN — CLOPIDOGREL 75 MG: 75 TABLET, FILM COATED ORAL at 08:27

## 2021-01-01 RX ADMIN — CLOPIDOGREL 75 MG: 75 TABLET, FILM COATED ORAL at 09:46

## 2021-01-01 RX ADMIN — INSULIN LISPRO 1 UNITS: 100 INJECTION, SOLUTION INTRAVENOUS; SUBCUTANEOUS at 13:25

## 2021-01-01 RX ADMIN — GENTAMICIN SULFATE 1 APPLICATION: 1 CREAM TOPICAL at 08:27

## 2021-01-01 RX ADMIN — PANTOPRAZOLE SODIUM 40 MG: 40 TABLET, DELAYED RELEASE ORAL at 05:38

## 2021-01-01 RX ADMIN — POTASSIUM PHOSPHATE, MONOBASIC POTASSIUM PHOSPHATE, DIBASIC 30 MMOL: 224; 236 INJECTION, SOLUTION, CONCENTRATE INTRAVENOUS at 12:11

## 2021-01-01 RX ADMIN — Medication 2000 UNITS: at 09:34

## 2021-01-01 RX ADMIN — INSULIN LISPRO 1 UNITS: 100 INJECTION, SOLUTION INTRAVENOUS; SUBCUTANEOUS at 11:44

## 2021-01-01 RX ADMIN — Medication 2000 UNITS: at 12:50

## 2021-01-01 RX ADMIN — SODIUM CHLORIDE 5.8 UNITS/HR: 9 INJECTION, SOLUTION INTRAVENOUS at 04:42

## 2021-01-01 RX ADMIN — NEPHROCAP 1 CAPSULE: 1 CAP ORAL at 17:21

## 2021-01-01 RX ADMIN — METOPROLOL SUCCINATE 50 MG: 50 TABLET, EXTENDED RELEASE ORAL at 12:26

## 2021-01-01 RX ADMIN — INSULIN LISPRO 1 UNITS: 100 INJECTION, SOLUTION INTRAVENOUS; SUBCUTANEOUS at 23:55

## 2021-01-01 RX ADMIN — LATANOPROST 1 DROP: 50 SOLUTION OPHTHALMIC at 21:45

## 2021-01-01 RX ADMIN — PERPHENAZINE 4 MG: 4 TABLET, FILM COATED ORAL at 16:54

## 2021-01-01 RX ADMIN — INSULIN HUMAN 10 UNITS: 100 INJECTION, SUSPENSION SUBCUTANEOUS at 14:38

## 2021-01-01 RX ADMIN — ONDANSETRON 4 MG: 2 INJECTION INTRAMUSCULAR; INTRAVENOUS at 19:21

## 2021-01-01 RX ADMIN — INSULIN LISPRO 1 UNITS: 100 INJECTION, SOLUTION INTRAVENOUS; SUBCUTANEOUS at 08:32

## 2021-01-01 RX ADMIN — ASPIRIN 81 MG: 81 TABLET, COATED ORAL at 08:54

## 2021-01-01 RX ADMIN — CALCITRIOL 0.25 MCG: 0.25 CAPSULE, LIQUID FILLED ORAL at 09:55

## 2021-01-01 RX ADMIN — HEPARIN SODIUM 5000 UNITS: 5000 INJECTION INTRAVENOUS; SUBCUTANEOUS at 22:04

## 2021-01-01 RX ADMIN — INSULIN LISPRO 2 UNITS: 100 INJECTION, SOLUTION INTRAVENOUS; SUBCUTANEOUS at 09:48

## 2021-01-01 RX ADMIN — PERPHENAZINE 4 MG: 4 TABLET, FILM COATED ORAL at 22:12

## 2021-01-01 RX ADMIN — METOPROLOL SUCCINATE 50 MG: 50 TABLET, EXTENDED RELEASE ORAL at 08:44

## 2021-01-01 RX ADMIN — INSULIN LISPRO 3 UNITS: 100 INJECTION, SOLUTION INTRAVENOUS; SUBCUTANEOUS at 17:09

## 2021-01-01 RX ADMIN — ACETAMINOPHEN 975 MG: 325 TABLET, FILM COATED ORAL at 00:02

## 2021-01-01 RX ADMIN — HEPARIN SODIUM 5000 UNITS: 5000 INJECTION INTRAVENOUS; SUBCUTANEOUS at 22:10

## 2021-01-01 RX ADMIN — ASPIRIN 81 MG: 81 TABLET, COATED ORAL at 09:10

## 2021-01-01 RX ADMIN — AMLODIPINE BESYLATE 5 MG: 5 TABLET ORAL at 09:08

## 2021-01-01 RX ADMIN — CLOPIDOGREL 75 MG: 75 TABLET, FILM COATED ORAL at 09:34

## 2021-01-01 RX ADMIN — HEPARIN SODIUM 5000 UNITS: 5000 INJECTION INTRAVENOUS; SUBCUTANEOUS at 06:39

## 2021-01-01 RX ADMIN — SACUBITRIL AND VALSARTAN 1 TABLET: 24; 26 TABLET, FILM COATED ORAL at 17:21

## 2021-01-01 RX ADMIN — HEPARIN SODIUM 5000 UNITS: 5000 INJECTION INTRAVENOUS; SUBCUTANEOUS at 05:05

## 2021-01-01 RX ADMIN — METOPROLOL SUCCINATE 25 MG: 25 TABLET, EXTENDED RELEASE ORAL at 09:10

## 2021-01-01 RX ADMIN — HEPARIN SODIUM 5000 UNITS: 5000 INJECTION INTRAVENOUS; SUBCUTANEOUS at 14:37

## 2021-01-01 RX ADMIN — SACUBITRIL AND VALSARTAN 1 TABLET: 24; 26 TABLET, FILM COATED ORAL at 17:48

## 2021-01-01 RX ADMIN — LATANOPROST 1 DROP: 50 SOLUTION OPHTHALMIC at 22:51

## 2021-01-01 RX ADMIN — CLOPIDOGREL 75 MG: 75 TABLET, FILM COATED ORAL at 12:26

## 2021-01-01 RX ADMIN — INSULIN LISPRO 1 UNITS: 100 INJECTION, SOLUTION INTRAVENOUS; SUBCUTANEOUS at 22:19

## 2021-01-01 RX ADMIN — ACETAMINOPHEN 975 MG: 325 TABLET, FILM COATED ORAL at 16:58

## 2021-01-01 RX ADMIN — HEPARIN SODIUM 5000 UNITS: 5000 INJECTION INTRAVENOUS; SUBCUTANEOUS at 14:08

## 2021-01-01 RX ADMIN — PERPHENAZINE 4 MG: 4 TABLET, FILM COATED ORAL at 08:46

## 2021-01-01 RX ADMIN — HEPARIN SODIUM 5000 UNITS: 5000 INJECTION INTRAVENOUS; SUBCUTANEOUS at 21:10

## 2021-01-01 RX ADMIN — Medication 2000 UNITS: at 08:26

## 2021-01-01 RX ADMIN — CLOPIDOGREL 75 MG: 75 TABLET, FILM COATED ORAL at 08:12

## 2021-01-01 RX ADMIN — INSULIN LISPRO 2 UNITS: 100 INJECTION, SOLUTION INTRAVENOUS; SUBCUTANEOUS at 17:05

## 2021-01-01 RX ADMIN — PERPHENAZINE 4 MG: 4 TABLET, FILM COATED ORAL at 17:13

## 2021-01-01 RX ADMIN — INSULIN LISPRO 1 UNITS: 100 INJECTION, SOLUTION INTRAVENOUS; SUBCUTANEOUS at 08:43

## 2021-01-01 RX ADMIN — Medication 2000 UNITS: at 16:52

## 2021-01-01 RX ADMIN — CLOPIDOGREL BISULFATE 75 MG: 75 TABLET ORAL at 09:08

## 2021-01-01 RX ADMIN — LATANOPROST 1 DROP: 50 SOLUTION OPHTHALMIC at 23:55

## 2021-01-01 RX ADMIN — LATANOPROST 1 DROP: 50 SOLUTION OPHTHALMIC at 22:35

## 2021-01-01 RX ADMIN — HEPARIN SODIUM 5000 UNITS: 5000 INJECTION INTRAVENOUS; SUBCUTANEOUS at 06:21

## 2021-01-01 RX ADMIN — POTASSIUM CHLORIDE 40 MEQ: 20 SOLUTION ORAL at 10:06

## 2021-01-01 RX ADMIN — MAGNESIUM SULFATE HEPTAHYDRATE 4 G: 40 INJECTION, SOLUTION INTRAVENOUS at 10:46

## 2021-01-01 RX ADMIN — SACUBITRIL AND VALSARTAN 1 TABLET: 24; 26 TABLET, FILM COATED ORAL at 17:08

## 2021-01-01 RX ADMIN — Medication 2000 UNITS: at 12:26

## 2021-01-01 RX ADMIN — LATANOPROST 1 DROP: 50 SOLUTION OPHTHALMIC at 22:19

## 2021-01-01 RX ADMIN — SACUBITRIL AND VALSARTAN 1 TABLET: 24; 26 TABLET, FILM COATED ORAL at 09:55

## 2021-01-01 RX ADMIN — AMLODIPINE BESYLATE 5 MG: 5 TABLET ORAL at 08:18

## 2021-01-01 RX ADMIN — POTASSIUM CHLORIDE 20 MEQ: 14.9 INJECTION, SOLUTION INTRAVENOUS at 00:00

## 2021-01-01 RX ADMIN — NEPHROCAP 1 CAPSULE: 1 CAP ORAL at 17:17

## 2021-01-01 RX ADMIN — HEPARIN SODIUM 5000 UNITS: 5000 INJECTION INTRAVENOUS; SUBCUTANEOUS at 23:29

## 2021-01-01 RX ADMIN — HEPARIN SODIUM 5000 UNITS: 5000 INJECTION INTRAVENOUS; SUBCUTANEOUS at 21:43

## 2021-01-01 RX ADMIN — CHOLESTYRAMINE 4 G: 4 POWDER, FOR SUSPENSION ORAL at 08:10

## 2021-01-01 RX ADMIN — ATORVASTATIN CALCIUM 40 MG: 40 TABLET, FILM COATED ORAL at 08:18

## 2021-01-01 RX ADMIN — GENTAMICIN SULFATE 1 APPLICATION: 1 CREAM TOPICAL at 09:52

## 2021-01-01 RX ADMIN — NEPHROCAP 1 CAPSULE: 1 CAP ORAL at 17:48

## 2021-01-01 RX ADMIN — PERPHENAZINE 4 MG: 4 TABLET, FILM COATED ORAL at 10:44

## 2021-01-01 RX ADMIN — NEPHROCAP 1 CAPSULE: 1 CAP ORAL at 17:13

## 2021-01-01 RX ADMIN — SACUBITRIL AND VALSARTAN 1 TABLET: 24; 26 TABLET, FILM COATED ORAL at 17:14

## 2021-01-01 RX ADMIN — SODIUM CHLORIDE, SODIUM GLUCONATE, SODIUM ACETATE, POTASSIUM CHLORIDE, MAGNESIUM CHLORIDE, SODIUM PHOSPHATE, DIBASIC, AND POTASSIUM PHOSPHATE 500 ML/HR: .53; .5; .37; .037; .03; .012; .00082 INJECTION, SOLUTION INTRAVENOUS at 04:14

## 2021-01-01 RX ADMIN — CALCIUM GLUCONATE 2 G: 20 INJECTION, SOLUTION INTRAVENOUS at 08:37

## 2021-01-01 RX ADMIN — DEXTROSE 50 ML/HR: 5 SOLUTION INTRAVENOUS at 10:30

## 2021-01-01 RX ADMIN — SERTRALINE 25 MG: 25 TABLET, FILM COATED ORAL at 09:10

## 2021-01-01 RX ADMIN — DEXTROSE MONOHYDRATE 25 ML: 500 INJECTION PARENTERAL at 13:38

## 2021-01-01 RX ADMIN — ASPIRIN 81 MG: 81 TABLET, COATED ORAL at 08:12

## 2021-01-01 RX ADMIN — PERPHENAZINE 4 MG: 4 TABLET, FILM COATED ORAL at 17:04

## 2021-01-01 RX ADMIN — INSULIN LISPRO 2 UNITS: 100 INJECTION, SOLUTION INTRAVENOUS; SUBCUTANEOUS at 22:43

## 2021-01-01 RX ADMIN — CLOPIDOGREL 75 MG: 75 TABLET, FILM COATED ORAL at 08:54

## 2021-01-01 RX ADMIN — INSULIN LISPRO 2 UNITS: 100 INJECTION, SOLUTION INTRAVENOUS; SUBCUTANEOUS at 13:07

## 2021-01-01 RX ADMIN — ASPIRIN 81 MG: 81 TABLET, COATED ORAL at 11:50

## 2021-01-01 RX ADMIN — PERPHENAZINE 4 MG: 4 TABLET, FILM COATED ORAL at 08:54

## 2021-01-01 RX ADMIN — INSULIN LISPRO 1 UNITS: 100 INJECTION, SOLUTION INTRAVENOUS; SUBCUTANEOUS at 08:45

## 2021-01-01 RX ADMIN — METOPROLOL SUCCINATE 25 MG: 25 TABLET, EXTENDED RELEASE ORAL at 12:38

## 2021-01-01 RX ADMIN — HEPARIN SODIUM 5000 UNITS: 5000 INJECTION INTRAVENOUS; SUBCUTANEOUS at 14:12

## 2021-01-01 RX ADMIN — HEPARIN SODIUM 5000 UNITS: 5000 INJECTION INTRAVENOUS; SUBCUTANEOUS at 21:01

## 2021-01-01 RX ADMIN — PANTOPRAZOLE SODIUM 40 MG: 40 TABLET, DELAYED RELEASE ORAL at 07:33

## 2021-01-01 RX ADMIN — HEPARIN SODIUM 5000 UNITS: 5000 INJECTION INTRAVENOUS; SUBCUTANEOUS at 05:59

## 2021-01-01 RX ADMIN — CHOLESTYRAMINE 4 G: 4 POWDER, FOR SUSPENSION ORAL at 16:53

## 2021-01-01 RX ADMIN — Medication 2000 UNITS: at 08:18

## 2021-01-01 RX ADMIN — INSULIN LISPRO 1 UNITS: 100 INJECTION, SOLUTION INTRAVENOUS; SUBCUTANEOUS at 16:02

## 2021-01-01 RX ADMIN — ATORVASTATIN CALCIUM 40 MG: 40 TABLET, FILM COATED ORAL at 09:34

## 2021-01-01 RX ADMIN — PERPHENAZINE 4 MG: 4 TABLET, FILM COATED ORAL at 23:10

## 2021-01-01 RX ADMIN — ASPIRIN 81 MG: 81 TABLET, COATED ORAL at 09:34

## 2021-01-01 RX ADMIN — PERPHENAZINE 4 MG: 4 TABLET, FILM COATED ORAL at 21:11

## 2021-01-01 RX ADMIN — SODIUM CHLORIDE, SODIUM GLUCONATE, SODIUM ACETATE, POTASSIUM CHLORIDE, MAGNESIUM CHLORIDE, SODIUM PHOSPHATE, DIBASIC, AND POTASSIUM PHOSPHATE 1000 ML: .53; .5; .37; .037; .03; .012; .00082 INJECTION, SOLUTION INTRAVENOUS at 03:37

## 2021-01-01 RX ADMIN — DEXTROSE MONOHYDRATE 50 ML: 25 INJECTION, SOLUTION INTRAVENOUS at 11:40

## 2021-01-01 RX ADMIN — ACETAMINOPHEN 975 MG: 325 TABLET, FILM COATED ORAL at 12:51

## 2021-01-01 RX ADMIN — CLOPIDOGREL BISULFATE 75 MG: 75 TABLET ORAL at 08:32

## 2021-01-01 RX ADMIN — PERPHENAZINE 4 MG: 4 TABLET, FILM COATED ORAL at 21:01

## 2021-01-01 RX ADMIN — INSULIN LISPRO 2 UNITS: 100 INJECTION, SOLUTION INTRAVENOUS; SUBCUTANEOUS at 08:33

## 2021-01-01 RX ADMIN — CHOLESTYRAMINE 4 G: 4 POWDER, FOR SUSPENSION ORAL at 17:08

## 2021-01-01 RX ADMIN — INSULIN GLARGINE 14 UNITS: 100 INJECTION, SOLUTION SUBCUTANEOUS at 22:04

## 2021-01-01 RX ADMIN — INSULIN LISPRO 3 UNITS: 100 INJECTION, SOLUTION INTRAVENOUS; SUBCUTANEOUS at 09:12

## 2021-01-01 RX ADMIN — HEPARIN SODIUM 5000 UNITS: 5000 INJECTION INTRAVENOUS; SUBCUTANEOUS at 13:24

## 2021-01-01 RX ADMIN — PERPHENAZINE 4 MG: 4 TABLET, FILM COATED ORAL at 08:28

## 2021-01-01 RX ADMIN — POTASSIUM CHLORIDE 20 MEQ: 1500 TABLET, EXTENDED RELEASE ORAL at 12:51

## 2021-01-01 RX ADMIN — HEPARIN SODIUM 5000 UNITS: 5000 INJECTION INTRAVENOUS; SUBCUTANEOUS at 05:19

## 2021-01-01 RX ADMIN — PERPHENAZINE 4 MG: 4 TABLET, FILM COATED ORAL at 17:01

## 2021-01-01 RX ADMIN — HEPARIN SODIUM 5000 UNITS: 5000 INJECTION INTRAVENOUS; SUBCUTANEOUS at 22:28

## 2021-01-01 RX ADMIN — PERPHENAZINE 4 MG: 4 TABLET, FILM COATED ORAL at 09:47

## 2021-01-01 RX ADMIN — ASPIRIN 81 MG: 81 TABLET, COATED ORAL at 09:08

## 2021-01-01 RX ADMIN — PANTOPRAZOLE SODIUM 40 MG: 40 TABLET, DELAYED RELEASE ORAL at 06:21

## 2021-01-01 RX ADMIN — CALCITRIOL 0.25 MCG: 0.25 CAPSULE, LIQUID FILLED ORAL at 09:09

## 2021-01-01 RX ADMIN — PERPHENAZINE 4 MG: 4 TABLET, FILM COATED ORAL at 22:56

## 2021-01-01 RX ADMIN — ATORVASTATIN CALCIUM 40 MG: 40 TABLET, FILM COATED ORAL at 09:08

## 2021-01-01 RX ADMIN — ACETAMINOPHEN 975 MG: 325 TABLET, FILM COATED ORAL at 22:00

## 2021-01-01 RX ADMIN — ACETAMINOPHEN 975 MG: 325 TABLET, FILM COATED ORAL at 14:26

## 2021-01-01 RX ADMIN — SACUBITRIL AND VALSARTAN 1 TABLET: 24; 26 TABLET, FILM COATED ORAL at 10:19

## 2021-01-01 RX ADMIN — PERPHENAZINE 4 MG: 4 TABLET, FILM COATED ORAL at 22:01

## 2021-01-01 RX ADMIN — ATORVASTATIN CALCIUM 40 MG: 40 TABLET, FILM COATED ORAL at 11:49

## 2021-01-01 RX ADMIN — HEPARIN SODIUM 5000 UNITS: 5000 INJECTION INTRAVENOUS; SUBCUTANEOUS at 06:36

## 2021-01-01 RX ADMIN — INSULIN LISPRO 4 UNITS: 100 INJECTION, SOLUTION INTRAVENOUS; SUBCUTANEOUS at 17:13

## 2021-01-01 RX ADMIN — SACUBITRIL AND VALSARTAN 1 TABLET: 24; 26 TABLET, FILM COATED ORAL at 09:09

## 2021-01-01 RX ADMIN — HEPARIN SODIUM 5000 UNITS: 5000 INJECTION INTRAVENOUS; SUBCUTANEOUS at 22:18

## 2021-01-01 RX ADMIN — PERPHENAZINE 4 MG: 4 TABLET, FILM COATED ORAL at 22:52

## 2021-01-01 RX ADMIN — HEPARIN SODIUM 5000 UNITS: 5000 INJECTION INTRAVENOUS; SUBCUTANEOUS at 21:30

## 2021-01-01 RX ADMIN — LATANOPROST 1 DROP: 50 SOLUTION OPHTHALMIC at 22:31

## 2021-01-01 RX ADMIN — LATANOPROST 1 DROP: 50 SOLUTION OPHTHALMIC at 22:12

## 2021-01-01 RX ADMIN — CHOLESTYRAMINE 4 G: 4 POWDER, FOR SUSPENSION ORAL at 17:09

## 2021-01-01 RX ADMIN — CALCITRIOL 0.25 MCG: 0.25 CAPSULE, LIQUID FILLED ORAL at 08:13

## 2021-01-01 RX ADMIN — INSULIN GLARGINE 10 UNITS: 100 INJECTION, SOLUTION SUBCUTANEOUS at 22:16

## 2021-01-01 RX ADMIN — PERPHENAZINE 4 MG: 4 TABLET, FILM COATED ORAL at 17:25

## 2021-01-01 RX ADMIN — INSULIN LISPRO 4 UNITS: 100 INJECTION, SOLUTION INTRAVENOUS; SUBCUTANEOUS at 23:31

## 2021-01-01 RX ADMIN — INSULIN GLARGINE 14 UNITS: 100 INJECTION, SOLUTION SUBCUTANEOUS at 23:09

## 2021-01-01 RX ADMIN — Medication 2000 UNITS: at 08:32

## 2021-01-01 RX ADMIN — ATORVASTATIN CALCIUM 40 MG: 40 TABLET, FILM COATED ORAL at 08:32

## 2021-01-01 RX ADMIN — GENTAMICIN SULFATE 1 APPLICATION: 1 CREAM TOPICAL at 09:47

## 2021-01-01 RX ADMIN — Medication 2000 UNITS: at 08:12

## 2021-01-01 RX ADMIN — PANTOPRAZOLE SODIUM 40 MG: 40 TABLET, DELAYED RELEASE ORAL at 05:19

## 2021-01-01 RX ADMIN — ALBUMIN (HUMAN) 12.5 G: 12.5 INJECTION, SOLUTION INTRAVENOUS at 15:50

## 2021-01-01 RX ADMIN — AMLODIPINE BESYLATE 5 MG: 5 TABLET ORAL at 11:50

## 2021-01-01 RX ADMIN — HEPARIN SODIUM 5000 UNITS: 5000 INJECTION INTRAVENOUS; SUBCUTANEOUS at 22:01

## 2021-01-01 RX ADMIN — ATORVASTATIN CALCIUM 40 MG: 40 TABLET, FILM COATED ORAL at 08:44

## 2021-12-04 PROBLEM — N18.6 ESRD ON PERITONEAL DIALYSIS (HCC): Status: ACTIVE | Noted: 2021-01-01

## 2021-12-04 PROBLEM — I10 HTN (HYPERTENSION): Status: ACTIVE | Noted: 2021-01-01

## 2021-12-04 PROBLEM — E78.5 HLD (HYPERLIPIDEMIA): Status: ACTIVE | Noted: 2021-01-01

## 2021-12-04 PROBLEM — K21.9 GERD (GASTROESOPHAGEAL REFLUX DISEASE): Status: ACTIVE | Noted: 2021-01-01

## 2021-12-04 PROBLEM — E11.9 TYPE 2 DIABETES MELLITUS (HCC): Status: ACTIVE | Noted: 2021-01-01

## 2021-12-04 PROBLEM — E11.01 HHNC (HYPERGLYCEMIC HYPEROSMOLAR NONKETOTIC COMA) (HCC): Status: ACTIVE | Noted: 2021-01-01

## 2021-12-04 PROBLEM — Z95.0 PACEMAKER: Status: ACTIVE | Noted: 2021-01-01

## 2021-12-04 PROBLEM — F32.A DEPRESSION: Status: ACTIVE | Noted: 2021-01-01

## 2021-12-04 PROBLEM — Z99.2 ESRD ON PERITONEAL DIALYSIS (HCC): Status: ACTIVE | Noted: 2021-01-01

## 2021-12-05 PROBLEM — E87.0 HYPERNATREMIA: Status: ACTIVE | Noted: 2021-01-01

## 2021-12-06 PROBLEM — G93.40 ENCEPHALOPATHY: Status: ACTIVE | Noted: 2021-01-01

## 2021-12-08 PROBLEM — I63.9 CVA (CEREBRAL VASCULAR ACCIDENT) (HCC): Status: ACTIVE | Noted: 2021-01-01

## 2021-12-08 PROBLEM — I95.9 HYPOTENSION: Status: ACTIVE | Noted: 2021-01-01

## 2021-12-10 PROBLEM — I25.5 ISCHEMIC CARDIOMYOPATHY: Status: ACTIVE | Noted: 2021-01-01

## 2021-12-10 PROBLEM — I95.9 HYPOTENSION: Status: RESOLVED | Noted: 2021-01-01 | Resolved: 2021-01-01

## 2021-12-10 PROBLEM — I25.10 CAD (CORONARY ARTERY DISEASE): Status: ACTIVE | Noted: 2021-01-01

## 2021-12-17 PROBLEM — R29.90 STROKE-LIKE SYMPTOMS: Status: ACTIVE | Noted: 2021-01-01

## 2021-12-22 PROBLEM — R93.89 ABNORMAL CT OF THE CHEST: Status: ACTIVE | Noted: 2021-01-01

## 2021-12-23 PROBLEM — R13.10 DYSPHAGIA: Status: ACTIVE | Noted: 2021-01-01

## 2021-12-23 PROBLEM — R26.2 AMBULATORY DYSFUNCTION: Status: ACTIVE | Noted: 2021-01-01

## 2022-01-01 ENCOUNTER — NURSING HOME VISIT (OUTPATIENT)
Dept: WOUND CARE | Facility: HOSPITAL | Age: 72
End: 2022-01-01
Payer: MEDICARE

## 2022-01-01 ENCOUNTER — TELEPHONE (OUTPATIENT)
Dept: OTHER | Facility: OTHER | Age: 72
End: 2022-01-01

## 2022-01-01 ENCOUNTER — APPOINTMENT (EMERGENCY)
Dept: RADIOLOGY | Facility: HOSPITAL | Age: 72
DRG: 689 | End: 2022-01-01
Payer: MEDICARE

## 2022-01-01 ENCOUNTER — APPOINTMENT (INPATIENT)
Dept: RADIOLOGY | Facility: HOSPITAL | Age: 72
DRG: 689 | End: 2022-01-01
Payer: MEDICARE

## 2022-01-01 ENCOUNTER — HOSPITAL ENCOUNTER (OUTPATIENT)
Dept: RADIOLOGY | Facility: HOSPITAL | Age: 72
Discharge: HOME | End: 2022-01-03
Attending: STUDENT IN AN ORGANIZED HEALTH CARE EDUCATION/TRAINING PROGRAM | Admitting: RADIOLOGY
Payer: MEDICARE

## 2022-01-01 ENCOUNTER — APPOINTMENT (OUTPATIENT)
Dept: RADIOLOGY | Facility: HOSPITAL | Age: 72
End: 2022-01-01
Payer: COMMERCIAL

## 2022-01-01 ENCOUNTER — HOSPITAL ENCOUNTER (OUTPATIENT)
Facility: HOSPITAL | Age: 72
Setting detail: OUTPATIENT SURGERY
Discharge: NURSING, REHAB OR LONG-TERM CARE | End: 2022-02-18
Attending: SURGERY | Admitting: SURGERY
Payer: COMMERCIAL

## 2022-01-01 ENCOUNTER — NURSING HOME VISIT (OUTPATIENT)
Dept: GERIATRICS | Facility: OTHER | Age: 72
End: 2022-01-01
Payer: MEDICARE

## 2022-01-01 ENCOUNTER — APPOINTMENT (EMERGENCY)
Dept: CT IMAGING | Facility: HOSPITAL | Age: 72
DRG: 689 | End: 2022-01-01
Payer: MEDICARE

## 2022-01-01 ENCOUNTER — APPOINTMENT (OUTPATIENT)
Dept: PREADMISSION TESTING | Facility: HOSPITAL | Age: 72
End: 2022-01-01

## 2022-01-01 ENCOUNTER — AMB VIDEO VISIT (OUTPATIENT)
Dept: WOUND CARE | Facility: HOSPITAL | Age: 72
End: 2022-01-01
Payer: MEDICARE

## 2022-01-01 ENCOUNTER — PREP FOR PROCEDURE (OUTPATIENT)
Dept: INTERVENTIONAL RADIOLOGY/VASCULAR | Facility: CLINIC | Age: 72
End: 2022-01-01

## 2022-01-01 ENCOUNTER — TELEPHONE (OUTPATIENT)
Dept: GERIATRICS | Age: 72
End: 2022-01-01

## 2022-01-01 ENCOUNTER — APPOINTMENT (EMERGENCY)
Dept: RADIOLOGY | Facility: HOSPITAL | Age: 72
End: 2022-01-01
Payer: MEDICARE

## 2022-01-01 ENCOUNTER — HOSPITAL ENCOUNTER (OUTPATIENT)
Dept: RADIOLOGY | Facility: HOSPITAL | Age: 72
Discharge: HOME | End: 2022-01-31
Attending: STUDENT IN AN ORGANIZED HEALTH CARE EDUCATION/TRAINING PROGRAM
Payer: MEDICARE

## 2022-01-01 ENCOUNTER — HOSPITAL ENCOUNTER (INPATIENT)
Facility: HOSPITAL | Age: 72
LOS: 4 days | Discharge: NURSING, REHAB OR LONG-TERM CARE | DRG: 689 | End: 2022-01-21
Attending: EMERGENCY MEDICINE | Admitting: INTERNAL MEDICINE
Payer: MEDICARE

## 2022-01-01 ENCOUNTER — HOSPITAL ENCOUNTER (EMERGENCY)
Facility: HOSPITAL | Age: 72
Discharge: HOME | End: 2022-01-22
Attending: EMERGENCY MEDICINE
Payer: MEDICARE

## 2022-01-01 ENCOUNTER — APPOINTMENT (INPATIENT)
Dept: CT IMAGING | Facility: HOSPITAL | Age: 72
DRG: 689 | End: 2022-01-01
Payer: MEDICARE

## 2022-01-01 ENCOUNTER — APPOINTMENT (INPATIENT)
Dept: DIALYSIS | Facility: HOSPITAL | Age: 72
DRG: 689 | End: 2022-01-01
Payer: MEDICARE

## 2022-01-01 ENCOUNTER — PREP FOR PROCEDURE (OUTPATIENT)
Dept: SURGERY | Facility: CLINIC | Age: 72
End: 2022-01-01

## 2022-01-01 ENCOUNTER — CONSULT (OUTPATIENT)
Dept: SURGERY | Facility: CLINIC | Age: 72
End: 2022-01-01
Payer: MEDICARE

## 2022-01-01 VITALS
OXYGEN SATURATION: 96 % | WEIGHT: 111.77 LBS | HEART RATE: 89 BPM | DIASTOLIC BLOOD PRESSURE: 61 MMHG | TEMPERATURE: 98.1 F | RESPIRATION RATE: 16 BRPM | BODY MASS INDEX: 19.19 KG/M2 | SYSTOLIC BLOOD PRESSURE: 133 MMHG

## 2022-01-01 VITALS
DIASTOLIC BLOOD PRESSURE: 66 MMHG | SYSTOLIC BLOOD PRESSURE: 119 MMHG | HEIGHT: 63 IN | BODY MASS INDEX: 19.65 KG/M2 | HEART RATE: 109 BPM | WEIGHT: 110.89 LBS | TEMPERATURE: 98.4 F | OXYGEN SATURATION: 99 %

## 2022-01-01 VITALS
WEIGHT: 108.8 LBS | BODY MASS INDEX: 18.68 KG/M2 | SYSTOLIC BLOOD PRESSURE: 133 MMHG | OXYGEN SATURATION: 96 % | HEART RATE: 82 BPM | DIASTOLIC BLOOD PRESSURE: 66 MMHG | RESPIRATION RATE: 18 BRPM | TEMPERATURE: 97.4 F

## 2022-01-01 VITALS
OXYGEN SATURATION: 98 % | RESPIRATION RATE: 18 BRPM | WEIGHT: 117.3 LBS | SYSTOLIC BLOOD PRESSURE: 144 MMHG | DIASTOLIC BLOOD PRESSURE: 75 MMHG | HEART RATE: 86 BPM | TEMPERATURE: 97.2 F | BODY MASS INDEX: 20.13 KG/M2

## 2022-01-01 VITALS — WEIGHT: 111 LBS | BODY MASS INDEX: 19.05 KG/M2 | TEMPERATURE: 97.2 F | HEART RATE: 76 BPM

## 2022-01-01 VITALS
TEMPERATURE: 97.6 F | RESPIRATION RATE: 18 BRPM | SYSTOLIC BLOOD PRESSURE: 104 MMHG | OXYGEN SATURATION: 100 % | DIASTOLIC BLOOD PRESSURE: 57 MMHG | HEART RATE: 80 BPM

## 2022-01-01 VITALS — OXYGEN SATURATION: 100 % | RESPIRATION RATE: 18 BRPM | HEART RATE: 101 BPM

## 2022-01-01 DIAGNOSIS — N39.0 UTI (URINARY TRACT INFECTION): Primary | ICD-10-CM

## 2022-01-01 DIAGNOSIS — Z99.2 ESRD (END STAGE RENAL DISEASE) ON DIALYSIS (HCC): Primary | ICD-10-CM

## 2022-01-01 DIAGNOSIS — Z99.2 ESRD (END STAGE RENAL DISEASE) ON DIALYSIS (HCC): ICD-10-CM

## 2022-01-01 DIAGNOSIS — N18.6 ESRD (END STAGE RENAL DISEASE) ON DIALYSIS (HCC): Primary | ICD-10-CM

## 2022-01-01 DIAGNOSIS — L89.150 PRESSURE INJURY OF SACRAL REGION, UNSTAGEABLE (HCC): ICD-10-CM

## 2022-01-01 DIAGNOSIS — N39.0 URINARY TRACT INFECTION WITHOUT HEMATURIA, SITE UNSPECIFIED: ICD-10-CM

## 2022-01-01 DIAGNOSIS — L97.909 ARTERIAL LEG ULCER (HCC): ICD-10-CM

## 2022-01-01 DIAGNOSIS — L89.310 PRESSURE INJURY OF RIGHT BUTTOCK, UNSTAGEABLE (HCC): ICD-10-CM

## 2022-01-01 DIAGNOSIS — L89.210 UNSTAGEABLE PRESSURE ULCER OF RIGHT HIP (HCC): ICD-10-CM

## 2022-01-01 DIAGNOSIS — L89.150 PRESSURE INJURY OF SACRAL REGION, UNSTAGEABLE (HCC): Primary | ICD-10-CM

## 2022-01-01 DIAGNOSIS — L89.210 PRESSURE INJURY OF RIGHT HIP, UNSTAGEABLE (HCC): ICD-10-CM

## 2022-01-01 DIAGNOSIS — L89.220 PRESSURE INJURY OF LEFT HIP, UNSTAGEABLE (HCC): ICD-10-CM

## 2022-01-01 DIAGNOSIS — R26.2 AMBULATORY DYSFUNCTION: ICD-10-CM

## 2022-01-01 DIAGNOSIS — Z79.4 TYPE 2 DIABETES MELLITUS WITH HYPEROSMOLARITY WITHOUT COMA, WITH LONG-TERM CURRENT USE OF INSULIN (HCC): Primary | ICD-10-CM

## 2022-01-01 DIAGNOSIS — L89.522 PRESSURE INJURY OF LEFT ANKLE, STAGE 2 (HCC): ICD-10-CM

## 2022-01-01 DIAGNOSIS — I25.5 ISCHEMIC CARDIOMYOPATHY: ICD-10-CM

## 2022-01-01 DIAGNOSIS — T82.9XXA COMPLICATION OF ARTERIOVENOUS DIALYSIS FISTULA, INITIAL ENCOUNTER: Primary | ICD-10-CM

## 2022-01-01 DIAGNOSIS — Z79.4 TYPE 2 DIABETES MELLITUS WITH HYPEROSMOLARITY WITHOUT COMA, WITH LONG-TERM CURRENT USE OF INSULIN (HCC): ICD-10-CM

## 2022-01-01 DIAGNOSIS — T14.8XXA DEEP TISSUE INJURY: ICD-10-CM

## 2022-01-01 DIAGNOSIS — I10 PRIMARY HYPERTENSION: ICD-10-CM

## 2022-01-01 DIAGNOSIS — N18.6 ESRD (END STAGE RENAL DISEASE) ON DIALYSIS (HCC): ICD-10-CM

## 2022-01-01 DIAGNOSIS — L89.313 PRESSURE INJURY OF RIGHT BUTTOCK, STAGE 3 (HCC): ICD-10-CM

## 2022-01-01 DIAGNOSIS — Z51.5 HOSPICE CARE: ICD-10-CM

## 2022-01-01 DIAGNOSIS — I63.9 CEREBROVASCULAR ACCIDENT (CVA), UNSPECIFIED MECHANISM (HCC): ICD-10-CM

## 2022-01-01 DIAGNOSIS — G93.40 ENCEPHALOPATHY: ICD-10-CM

## 2022-01-01 DIAGNOSIS — L89.323 PRESSURE INJURY OF LEFT BUTTOCK, STAGE 3 (HCC): ICD-10-CM

## 2022-01-01 DIAGNOSIS — Z99.2 PERITONEAL DIALYSIS CATHETER IN PLACE (HCC): Primary | ICD-10-CM

## 2022-01-01 DIAGNOSIS — E11.00 TYPE 2 DIABETES MELLITUS WITH HYPEROSMOLARITY WITHOUT COMA, WITH LONG-TERM CURRENT USE OF INSULIN (HCC): Primary | ICD-10-CM

## 2022-01-01 DIAGNOSIS — E11.00 TYPE 2 DIABETES MELLITUS WITH HYPEROSMOLARITY WITHOUT COMA, WITH LONG-TERM CURRENT USE OF INSULIN (HCC): ICD-10-CM

## 2022-01-01 DIAGNOSIS — R63.8 ALTERATION IN NUTRITION: ICD-10-CM

## 2022-01-01 LAB
2HR DELTA HS TROPONIN: -18 NG/L
4HR DELTA HS TROPONIN: 10 NG/L
ALBUMIN SERPL BCP-MCNC: 2 G/DL (ref 3.5–5)
ALBUMIN SERPL BCP-MCNC: 2 G/DL (ref 3.5–5)
ALBUMIN SERPL BCP-MCNC: 2.1 G/DL (ref 3.5–5)
ALBUMIN SERPL BCP-MCNC: 2.4 G/DL (ref 3.5–5)
ALBUMIN SERPL BCP-MCNC: 2.8 G/DL (ref 3.4–4.8)
ALP SERPL-CCNC: 101.1 U/L (ref 35–140)
ALP SERPL-CCNC: 117 U/L (ref 46–116)
ALP SERPL-CCNC: 118 U/L (ref 46–116)
ALP SERPL-CCNC: 128 U/L (ref 46–116)
ALP SERPL-CCNC: 98 U/L (ref 46–116)
ALT SERPL W P-5'-P-CCNC: 25 U/L (ref 5–54)
ALT SERPL W P-5'-P-CCNC: 26 U/L (ref 12–78)
ALT SERPL W P-5'-P-CCNC: 28 U/L (ref 12–78)
ALT SERPL W P-5'-P-CCNC: 30 U/L (ref 12–78)
ALT SERPL W P-5'-P-CCNC: 35 U/L (ref 12–78)
ANION GAP SERPL CALCULATED.3IONS-SCNC: 10 MMOL/L (ref 4–13)
ANION GAP SERPL CALCULATED.3IONS-SCNC: 11 MMOL/L (ref 4–13)
ANION GAP SERPL CALCULATED.3IONS-SCNC: 11 MMOL/L (ref 4–13)
ANION GAP SERPL CALCULATED.3IONS-SCNC: 12 MMOL/L (ref 4–13)
ANION GAP SERPL CALCULATED.3IONS-SCNC: 12 MMOL/L (ref 4–13)
ANION GAP SERPL CALCULATED.3IONS-SCNC: 9 MMOL/L (ref 4–13)
ARTERIAL PATENCY WRIST A: YES
AST SERPL W P-5'-P-CCNC: 24 U/L (ref 15–41)
AST SERPL W P-5'-P-CCNC: 32 U/L (ref 5–45)
AST SERPL W P-5'-P-CCNC: 37 U/L (ref 5–45)
AST SERPL W P-5'-P-CCNC: 38 U/L (ref 5–45)
AST SERPL W P-5'-P-CCNC: 47 U/L (ref 5–45)
ATRIAL RATE: 119 BPM
ATRIAL RATE: 79 BPM
ATRIAL RATE: 98 BPM
BACTERIA BLD CULT: NORMAL
BACTERIA BLD CULT: NORMAL
BACTERIA UR CULT: ABNORMAL
BACTERIA UR QL AUTO: ABNORMAL /HPF
BASE EXCESS BLDA CALC-SCNC: -5.5 MMOL/L
BASOPHILS # BLD AUTO: 0.01 THOUSANDS/ΜL (ref 0–0.1)
BASOPHILS # BLD AUTO: 0.02 THOUSANDS/ΜL (ref 0–0.1)
BASOPHILS NFR BLD AUTO: 0 % (ref 0–1)
BILIRUB SERPL-MCNC: 0.21 MG/DL (ref 0.2–1)
BILIRUB SERPL-MCNC: 0.26 MG/DL (ref 0.2–1)
BILIRUB SERPL-MCNC: 0.31 MG/DL (ref 0.3–1.2)
BILIRUB SERPL-MCNC: 0.32 MG/DL (ref 0.2–1)
BILIRUB SERPL-MCNC: 0.33 MG/DL (ref 0.2–1)
BILIRUB UR QL STRIP: NEGATIVE
BUN SERPL-MCNC: 36 MG/DL (ref 5–25)
BUN SERPL-MCNC: 56 MG/DL (ref 6–20)
BUN SERPL-MCNC: 58 MG/DL (ref 5–25)
BUN SERPL-MCNC: 68 MG/DL (ref 5–25)
BUN SERPL-MCNC: 73 MG/DL (ref 5–25)
BUN SERPL-MCNC: 77 MG/DL (ref 5–25)
C DIFF TOX GENS STL QL NAA+PROBE: NEGATIVE
CA-I BLD-SCNC: 1.38 MMOL/L (ref 1.12–1.32)
CALCIUM ALBUM COR SERPL-MCNC: 10.7 MG/DL (ref 8.3–10.1)
CALCIUM ALBUM COR SERPL-MCNC: 10.8 MG/DL (ref 8.3–10.1)
CALCIUM ALBUM COR SERPL-MCNC: 11.1 MG/DL (ref 8.3–10.1)
CALCIUM ALBUM COR SERPL-MCNC: 11.1 MG/DL (ref 8.3–10.1)
CALCIUM ALBUM COR SERPL-MCNC: 11.5 MG/DL (ref 8.3–10.1)
CALCIUM SERPL-MCNC: 10.1 MG/DL (ref 8.3–10.1)
CALCIUM SERPL-MCNC: 9.4 MG/DL (ref 8.3–10.1)
CALCIUM SERPL-MCNC: 9.5 MG/DL (ref 8.3–10.1)
CALCIUM SERPL-MCNC: 9.6 MG/DL (ref 8.3–10.1)
CALCIUM SERPL-MCNC: 9.8 MG/DL (ref 8.4–10.2)
CALCIUM SERPL-MCNC: 9.9 MG/DL (ref 8.3–10.1)
CAMPYLOBACTER DNA SPEC NAA+PROBE: NORMAL
CARDIAC TROPONIN I PNL SERPL HS: 259 NG/L
CARDIAC TROPONIN I PNL SERPL HS: 277 NG/L
CARDIAC TROPONIN I PNL SERPL HS: 287 NG/L
CHLORIDE SERPL-SCNC: 105 MMOL/L (ref 100–108)
CHLORIDE SERPL-SCNC: 105 MMOL/L (ref 96–108)
CHLORIDE SERPL-SCNC: 106 MMOL/L (ref 100–108)
CHLORIDE SERPL-SCNC: 107 MMOL/L (ref 100–108)
CLARITY UR: ABNORMAL
CO2 SERPL-SCNC: 20 MMOL/L (ref 21–32)
CO2 SERPL-SCNC: 22 MMOL/L (ref 21–32)
CO2 SERPL-SCNC: 23 MMOL/L (ref 21–32)
CO2 SERPL-SCNC: 23 MMOL/L (ref 22–33)
COLOR UR: YELLOW
CREAT SERPL-MCNC: 2.55 MG/DL (ref 0.6–1.3)
CREAT SERPL-MCNC: 2.75 MG/DL (ref 0.6–1.3)
CREAT SERPL-MCNC: 2.94 MG/DL (ref 0.6–1.3)
CREAT SERPL-MCNC: 3.53 MG/DL (ref 0.4–1.1)
CREAT SERPL-MCNC: 3.73 MG/DL (ref 0.6–1.3)
CREAT SERPL-MCNC: 4.47 MG/DL (ref 0.6–1.3)
D DIMER PPP FEU-MCNC: 0.97 UG/ML FEU
EOSINOPHIL # BLD AUTO: 0 THOUSAND/ΜL (ref 0–0.61)
EOSINOPHIL # BLD AUTO: 0.02 THOUSAND/ΜL (ref 0–0.61)
EOSINOPHIL # BLD AUTO: 0.02 THOUSAND/ΜL (ref 0–0.61)
EOSINOPHIL # BLD AUTO: 0.03 THOUSAND/ΜL (ref 0–0.61)
EOSINOPHIL # BLD AUTO: 0.03 THOUSAND/ΜL (ref 0–0.61)
EOSINOPHIL # BLD AUTO: 0.04 THOUSAND/ΜL (ref 0–0.61)
EOSINOPHIL NFR BLD AUTO: 0 % (ref 0–6)
EOSINOPHIL NFR BLD AUTO: 1 % (ref 0–6)
ERYTHROCYTE [DISTWIDTH] IN BLOOD BY AUTOMATED COUNT: 16.1 % (ref 11.6–15.1)
ERYTHROCYTE [DISTWIDTH] IN BLOOD BY AUTOMATED COUNT: 17 % (ref 11.6–15.1)
ERYTHROCYTE [DISTWIDTH] IN BLOOD BY AUTOMATED COUNT: 17.1 % (ref 11.6–15.1)
ERYTHROCYTE [DISTWIDTH] IN BLOOD BY AUTOMATED COUNT: 17.2 % (ref 11.6–15.1)
FLUAV RNA RESP QL NAA+PROBE: NEGATIVE
FLUBV RNA RESP QL NAA+PROBE: NEGATIVE
GFR SERPL CREATININE-BSD FRML MDRD: 11 ML/MIN/1.73SQ M
GFR SERPL CREATININE-BSD FRML MDRD: 12 ML/MIN/1.73SQ M
GFR SERPL CREATININE-BSD FRML MDRD: 15 ML/MIN/1.73SQ M
GFR SERPL CREATININE-BSD FRML MDRD: 16 ML/MIN/1.73SQ M
GFR SERPL CREATININE-BSD FRML MDRD: 18 ML/MIN/1.73SQ M
GFR SERPL CREATININE-BSD FRML MDRD: 9 ML/MIN/1.73SQ M
GLUCOSE SERPL-MCNC: 100 MG/DL (ref 65–140)
GLUCOSE SERPL-MCNC: 106 MG/DL (ref 65–140)
GLUCOSE SERPL-MCNC: 132 MG/DL (ref 65–140)
GLUCOSE SERPL-MCNC: 133 MG/DL (ref 65–140)
GLUCOSE SERPL-MCNC: 134 MG/DL (ref 65–140)
GLUCOSE SERPL-MCNC: 152 MG/DL (ref 65–140)
GLUCOSE SERPL-MCNC: 173 MG/DL (ref 65–140)
GLUCOSE SERPL-MCNC: 183 MG/DL (ref 65–140)
GLUCOSE SERPL-MCNC: 194 MG/DL (ref 65–140)
GLUCOSE SERPL-MCNC: 255 MG/DL (ref 65–140)
GLUCOSE SERPL-MCNC: 59 MG/DL (ref 65–140)
GLUCOSE SERPL-MCNC: 79 MG/DL (ref 65–140)
GLUCOSE SERPL-MCNC: 81 MG/DL (ref 65–140)
GLUCOSE SERPL-MCNC: 89 MG/DL (ref 65–140)
GLUCOSE SERPL-MCNC: 89 MG/DL (ref 65–140)
GLUCOSE SERPL-MCNC: 90 MG/DL (ref 65–140)
GLUCOSE SERPL-MCNC: 92 MG/DL (ref 65–140)
GLUCOSE SERPL-MCNC: 92 MG/DL (ref 65–140)
GLUCOSE SERPL-MCNC: 96 MG/DL (ref 65–140)
GLUCOSE SERPL-MCNC: 97 MG/DL (ref 65–140)
GLUCOSE UR STRIP-MCNC: NEGATIVE MG/DL
HCO3 BLDA-SCNC: 19.6 MMOL/L (ref 22–28)
HCT VFR BLD AUTO: 25.4 % (ref 34.8–46.1)
HCT VFR BLD AUTO: 28.1 % (ref 34.8–46.1)
HCT VFR BLD AUTO: 28.1 % (ref 34.8–46.1)
HCT VFR BLD AUTO: 28.5 % (ref 34.8–46.1)
HCT VFR BLD AUTO: 29.2 % (ref 34.8–46.1)
HCT VFR BLD AUTO: 30.5 % (ref 34.8–46.1)
HGB BLD-MCNC: 8 G/DL (ref 11.5–15.4)
HGB BLD-MCNC: 8.9 G/DL (ref 11.5–15.4)
HGB BLD-MCNC: 9.3 G/DL (ref 11.5–15.4)
HGB BLD-MCNC: 9.3 G/DL (ref 11.5–15.4)
HGB BLD-MCNC: 9.4 G/DL (ref 11.5–15.4)
HGB BLD-MCNC: 9.6 G/DL (ref 11.5–15.4)
HGB UR QL STRIP.AUTO: ABNORMAL
IMM GRANULOCYTES # BLD AUTO: 0.03 THOUSAND/UL (ref 0–0.2)
IMM GRANULOCYTES # BLD AUTO: 0.04 THOUSAND/UL (ref 0–0.2)
IMM GRANULOCYTES # BLD AUTO: 0.06 THOUSAND/UL (ref 0–0.2)
IMM GRANULOCYTES NFR BLD AUTO: 0 % (ref 0–2)
IMM GRANULOCYTES NFR BLD AUTO: 1 % (ref 0–2)
KETONES UR STRIP-MCNC: NEGATIVE MG/DL
LACTATE SERPL-SCNC: 1.9 MMOL/L (ref 0.5–2)
LEUKOCYTE ESTERASE UR QL STRIP: ABNORMAL
LYMPHOCYTES # BLD AUTO: 1.6 THOUSANDS/ΜL (ref 0.6–4.47)
LYMPHOCYTES # BLD AUTO: 1.79 THOUSANDS/ΜL (ref 0.6–4.47)
LYMPHOCYTES # BLD AUTO: 2.44 THOUSANDS/ΜL (ref 0.6–4.47)
LYMPHOCYTES # BLD AUTO: 2.67 THOUSANDS/ΜL (ref 0.6–4.47)
LYMPHOCYTES # BLD AUTO: 2.77 THOUSANDS/ΜL (ref 0.6–4.47)
LYMPHOCYTES # BLD AUTO: 2.93 THOUSANDS/ΜL (ref 0.6–4.47)
LYMPHOCYTES NFR BLD AUTO: 31 % (ref 14–44)
LYMPHOCYTES NFR BLD AUTO: 34 % (ref 14–44)
LYMPHOCYTES NFR BLD AUTO: 36 % (ref 14–44)
LYMPHOCYTES NFR BLD AUTO: 42 % (ref 14–44)
LYMPHOCYTES NFR BLD AUTO: 43 % (ref 14–44)
LYMPHOCYTES NFR BLD AUTO: 44 % (ref 14–44)
MCH RBC QN AUTO: 29 PG (ref 26.8–34.3)
MCH RBC QN AUTO: 29.7 PG (ref 26.8–34.3)
MCH RBC QN AUTO: 29.8 PG (ref 26.8–34.3)
MCH RBC QN AUTO: 29.8 PG (ref 26.8–34.3)
MCH RBC QN AUTO: 29.9 PG (ref 26.8–34.3)
MCH RBC QN AUTO: 30.6 PG (ref 26.8–34.3)
MCHC RBC AUTO-ENTMCNC: 31.5 G/DL (ref 31.4–37.4)
MCHC RBC AUTO-ENTMCNC: 31.5 G/DL (ref 31.4–37.4)
MCHC RBC AUTO-ENTMCNC: 31.7 G/DL (ref 31.4–37.4)
MCHC RBC AUTO-ENTMCNC: 31.8 G/DL (ref 31.4–37.4)
MCHC RBC AUTO-ENTMCNC: 33 G/DL (ref 31.4–37.4)
MCHC RBC AUTO-ENTMCNC: 33.1 G/DL (ref 31.4–37.4)
MCV RBC AUTO: 91 FL (ref 82–98)
MCV RBC AUTO: 92 FL (ref 82–98)
MCV RBC AUTO: 92 FL (ref 82–98)
MCV RBC AUTO: 94 FL (ref 82–98)
MCV RBC AUTO: 94 FL (ref 82–98)
MCV RBC AUTO: 95 FL (ref 82–98)
MONOCYTES # BLD AUTO: 0.38 THOUSAND/ΜL (ref 0.17–1.22)
MONOCYTES # BLD AUTO: 0.41 THOUSAND/ΜL (ref 0.17–1.22)
MONOCYTES # BLD AUTO: 0.41 THOUSAND/ΜL (ref 0.17–1.22)
MONOCYTES # BLD AUTO: 0.42 THOUSAND/ΜL (ref 0.17–1.22)
MONOCYTES # BLD AUTO: 0.46 THOUSAND/ΜL (ref 0.17–1.22)
MONOCYTES # BLD AUTO: 0.5 THOUSAND/ΜL (ref 0.17–1.22)
MONOCYTES NFR BLD AUTO: 6 % (ref 4–12)
MONOCYTES NFR BLD AUTO: 7 % (ref 4–12)
MONOCYTES NFR BLD AUTO: 8 % (ref 4–12)
MONOCYTES NFR BLD AUTO: 9 % (ref 4–12)
MRSA NOSE QL CULT: NORMAL
NASAL CANNULA: 2
NEUTROPHILS # BLD AUTO: 2.59 THOUSANDS/ΜL (ref 1.85–7.62)
NEUTROPHILS # BLD AUTO: 3.04 THOUSANDS/ΜL (ref 1.85–7.62)
NEUTROPHILS # BLD AUTO: 3.07 THOUSANDS/ΜL (ref 1.85–7.62)
NEUTROPHILS # BLD AUTO: 3.36 THOUSANDS/ΜL (ref 1.85–7.62)
NEUTROPHILS # BLD AUTO: 3.61 THOUSANDS/ΜL (ref 1.85–7.62)
NEUTROPHILS # BLD AUTO: 3.81 THOUSANDS/ΜL (ref 1.85–7.62)
NEUTS SEG NFR BLD AUTO: 48 % (ref 43–75)
NEUTS SEG NFR BLD AUTO: 48 % (ref 43–75)
NEUTS SEG NFR BLD AUTO: 51 % (ref 43–75)
NEUTS SEG NFR BLD AUTO: 55 % (ref 43–75)
NEUTS SEG NFR BLD AUTO: 55 % (ref 43–75)
NEUTS SEG NFR BLD AUTO: 61 % (ref 43–75)
NITRITE UR QL STRIP: NEGATIVE
NON-SQ EPI CELLS URNS QL MICRO: ABNORMAL /HPF
NRBC BLD AUTO-RTO: 0 /100 WBCS
NT-PROBNP SERPL-MCNC: ABNORMAL PG/ML
O2 CT BLDA-SCNC: 12.7 ML/DL (ref 16–23)
OTHER STN SPEC: ABNORMAL
OXYHGB MFR BLDA: 98.2 % (ref 94–97)
P AXIS: -37 DEGREES
P AXIS: -73 DEGREES
P AXIS: 43 DEGREES
PCO2 BLDA: 36.3 MM HG (ref 36–44)
PH BLDA: 7.35 [PH] (ref 7.35–7.45)
PH UR STRIP.AUTO: 6 [PH]
PLATELET # BLD AUTO: 201 THOUSANDS/UL (ref 149–390)
PLATELET # BLD AUTO: 242 THOUSANDS/UL (ref 149–390)
PLATELET # BLD AUTO: 249 THOUSANDS/UL (ref 149–390)
PLATELET # BLD AUTO: 255 THOUSANDS/UL (ref 149–390)
PLATELET # BLD AUTO: 257 THOUSANDS/UL (ref 149–390)
PLATELET # BLD AUTO: 257 THOUSANDS/UL (ref 149–390)
PMV BLD AUTO: 11 FL (ref 8.9–12.7)
PMV BLD AUTO: 11.1 FL (ref 8.9–12.7)
PMV BLD AUTO: 11.6 FL (ref 8.9–12.7)
PMV BLD AUTO: 11.6 FL (ref 8.9–12.7)
PMV BLD AUTO: 11.7 FL (ref 8.9–12.7)
PMV BLD AUTO: 12 FL (ref 8.9–12.7)
PO2 BLDA: 160.3 MM HG (ref 75–129)
POTASSIUM SERPL-SCNC: 3.2 MMOL/L (ref 3.5–5.3)
POTASSIUM SERPL-SCNC: 3.3 MMOL/L (ref 3.5–5.3)
POTASSIUM SERPL-SCNC: 3.5 MMOL/L (ref 3.5–5)
POTASSIUM SERPL-SCNC: 3.6 MMOL/L (ref 3.5–5.3)
POTASSIUM SERPL-SCNC: 4 MMOL/L (ref 3.5–5.3)
POTASSIUM SERPL-SCNC: 4.5 MMOL/L (ref 3.5–5.3)
PR INTERVAL: 202 MS
PR INTERVAL: 229 MS
PR INTERVAL: 440 MS
PROT SERPL-MCNC: 5.2 G/DL (ref 6.4–8.2)
PROT SERPL-MCNC: 5.4 G/DL (ref 6.4–8.2)
PROT SERPL-MCNC: 5.5 G/DL (ref 6.4–8.2)
PROT SERPL-MCNC: 5.5 G/DL (ref 6.4–8.3)
PROT SERPL-MCNC: 5.7 G/DL (ref 6.4–8.2)
PROT UR STRIP-MCNC: ABNORMAL MG/DL
PTH-INTACT SERPL-MCNC: 69 PG/ML (ref 18.4–80.1)
QRS AXIS: -72 DEGREES
QRS AXIS: -73 DEGREES
QRS AXIS: -77 DEGREES
QRSD INTERVAL: 126 MS
QRSD INTERVAL: 159 MS
QRSD INTERVAL: 164 MS
QT INTERVAL: 394 MS
QT INTERVAL: 418 MS
QT INTERVAL: 455 MS
QTC INTERVAL: 522 MS
QTC INTERVAL: 533 MS
QTC INTERVAL: 543 MS
RBC # BLD AUTO: 2.68 MILLION/UL (ref 3.81–5.12)
RBC # BLD AUTO: 3 MILLION/UL (ref 3.81–5.12)
RBC # BLD AUTO: 3.04 MILLION/UL (ref 3.81–5.12)
RBC # BLD AUTO: 3.12 MILLION/UL (ref 3.81–5.12)
RBC # BLD AUTO: 3.15 MILLION/UL (ref 3.81–5.12)
RBC # BLD AUTO: 3.31 MILLION/UL (ref 3.81–5.12)
RBC #/AREA URNS AUTO: ABNORMAL /HPF
RSV RNA RESP QL NAA+PROBE: NEGATIVE
SALMONELLA DNA SPEC QL NAA+PROBE: NORMAL
SARS-COV-2 RNA RESP QL NAA+PROBE: NEGATIVE
SHIGA TOXIN STX GENE SPEC NAA+PROBE: NORMAL
SHIGELLA DNA SPEC QL NAA+PROBE: NORMAL
SODIUM SERPL-SCNC: 137 MMOL/L (ref 136–145)
SODIUM SERPL-SCNC: 137 MMOL/L (ref 136–145)
SODIUM SERPL-SCNC: 138 MMOL/L (ref 133–145)
SODIUM SERPL-SCNC: 138 MMOL/L (ref 136–145)
SODIUM SERPL-SCNC: 138 MMOL/L (ref 136–145)
SODIUM SERPL-SCNC: 140 MMOL/L (ref 136–145)
SP GR UR STRIP.AUTO: 1.01 (ref 1–1.03)
SPECIMEN SOURCE: ABNORMAL
T WAVE AXIS: 106 DEGREES
T WAVE AXIS: 93 DEGREES
T WAVE AXIS: 94 DEGREES
UROBILINOGEN UR QL STRIP.AUTO: 0.2 E.U./DL
VENTRICULAR RATE: 114 BPM
VENTRICULAR RATE: 79 BPM
VENTRICULAR RATE: 98 BPM
WBC # BLD AUTO: 4.67 THOUSAND/UL (ref 4.31–10.16)
WBC # BLD AUTO: 5.83 THOUSAND/UL (ref 4.31–10.16)
WBC # BLD AUTO: 6.29 THOUSAND/UL (ref 4.31–10.16)
WBC # BLD AUTO: 6.32 THOUSAND/UL (ref 4.31–10.16)
WBC # BLD AUTO: 6.77 THOUSAND/UL (ref 4.31–10.16)
WBC # BLD AUTO: 6.83 THOUSAND/UL (ref 4.31–10.16)
WBC #/AREA URNS AUTO: ABNORMAL /HPF

## 2022-01-01 PROCEDURE — 99308 SBSQ NF CARE LOW MDM 20: CPT | Performed by: NURSE PRACTITIONER

## 2022-01-01 PROCEDURE — 71045 X-RAY EXAM CHEST 1 VIEW: CPT

## 2022-01-01 PROCEDURE — 99203 OFFICE O/P NEW LOW 30 MIN: CPT | Performed by: NURSE PRACTITIONER

## 2022-01-01 PROCEDURE — 85379 FIBRIN DEGRADATION QUANT: CPT

## 2022-01-01 PROCEDURE — NC001 PR NO CHARGE: Performed by: SURGERY

## 2022-01-01 PROCEDURE — 36415 COLL VENOUS BLD VENIPUNCTURE: CPT

## 2022-01-01 PROCEDURE — 99233 SBSQ HOSP IP/OBS HIGH 50: CPT | Performed by: INTERNAL MEDICINE

## 2022-01-01 PROCEDURE — 93005 ELECTROCARDIOGRAM TRACING: CPT

## 2022-01-01 PROCEDURE — 87077 CULTURE AEROBIC IDENTIFY: CPT

## 2022-01-01 PROCEDURE — 93010 ELECTROCARDIOGRAM REPORT: CPT | Performed by: INTERNAL MEDICINE

## 2022-01-01 PROCEDURE — 83970 ASSAY OF PARATHORMONE: CPT | Performed by: INTERNAL MEDICINE

## 2022-01-01 PROCEDURE — 99222 1ST HOSP IP/OBS MODERATE 55: CPT | Performed by: INTERNAL MEDICINE

## 2022-01-01 PROCEDURE — 99285 EMERGENCY DEPT VISIT HI MDM: CPT | Performed by: EMERGENCY MEDICINE

## 2022-01-01 PROCEDURE — 99284 EMERGENCY DEPT VISIT MOD MDM: CPT

## 2022-01-01 PROCEDURE — 99285 EMERGENCY DEPT VISIT HI MDM: CPT

## 2022-01-01 PROCEDURE — C1769 GUIDE WIRE: HCPCS

## 2022-01-01 PROCEDURE — 99203 OFFICE O/P NEW LOW 30 MIN: CPT | Performed by: SURGERY

## 2022-01-01 PROCEDURE — 87086 URINE CULTURE/COLONY COUNT: CPT

## 2022-01-01 PROCEDURE — 85025 COMPLETE CBC W/AUTO DIFF WBC: CPT | Performed by: EMERGENCY MEDICINE

## 2022-01-01 PROCEDURE — 85025 COMPLETE CBC W/AUTO DIFF WBC: CPT

## 2022-01-01 PROCEDURE — 83605 ASSAY OF LACTIC ACID: CPT

## 2022-01-01 PROCEDURE — C1750 CATH, HEMODIALYSIS,LONG-TERM: HCPCS

## 2022-01-01 PROCEDURE — 74018 RADEX ABDOMEN 1 VIEW: CPT

## 2022-01-01 PROCEDURE — 36581 REPLACE TUNNELED CV CATH: CPT

## 2022-01-01 PROCEDURE — 87493 C DIFF AMPLIFIED PROBE: CPT | Performed by: EMERGENCY MEDICINE

## 2022-01-01 PROCEDURE — 99309 SBSQ NF CARE MODERATE MDM 30: CPT | Performed by: NURSE PRACTITIONER

## 2022-01-01 PROCEDURE — 36415 COLL VENOUS BLD VENIPUNCTURE: CPT | Performed by: EMERGENCY MEDICINE

## 2022-01-01 PROCEDURE — 87505 NFCT AGENT DETECTION GI: CPT | Performed by: EMERGENCY MEDICINE

## 2022-01-01 PROCEDURE — 80053 COMPREHEN METABOLIC PANEL: CPT

## 2022-01-01 PROCEDURE — 80053 COMPREHEN METABOLIC PANEL: CPT | Performed by: EMERGENCY MEDICINE

## 2022-01-01 PROCEDURE — 77001 FLUOROGUIDE FOR VEIN DEVICE: CPT

## 2022-01-01 PROCEDURE — 82330 ASSAY OF CALCIUM: CPT | Performed by: INTERNAL MEDICINE

## 2022-01-01 PROCEDURE — 36581 REPLACE TUNNELED CV CATH: CPT | Performed by: RADIOLOGY

## 2022-01-01 PROCEDURE — 87186 SC STD MICRODIL/AGAR DIL: CPT

## 2022-01-01 PROCEDURE — 0241U HB NFCT DS VIR RESP RNA 4 TRGT: CPT

## 2022-01-01 PROCEDURE — 82948 REAGENT STRIP/BLOOD GLUCOSE: CPT

## 2022-01-01 PROCEDURE — 71046 X-RAY EXAM CHEST 2 VIEWS: CPT

## 2022-01-01 PROCEDURE — 97597 DBRDMT OPN WND 1ST 20 CM/<: CPT | Performed by: NURSE PRACTITIONER

## 2022-01-01 PROCEDURE — 87040 BLOOD CULTURE FOR BACTERIA: CPT

## 2022-01-01 PROCEDURE — 90935 HEMODIALYSIS ONE EVALUATION: CPT | Performed by: INTERNAL MEDICINE

## 2022-01-01 PROCEDURE — 36600 WITHDRAWAL OF ARTERIAL BLOOD: CPT

## 2022-01-01 PROCEDURE — 80048 BASIC METABOLIC PNL TOTAL CA: CPT | Performed by: INTERNAL MEDICINE

## 2022-01-01 PROCEDURE — 99223 1ST HOSP IP/OBS HIGH 75: CPT | Performed by: INTERNAL MEDICINE

## 2022-01-01 PROCEDURE — 84484 ASSAY OF TROPONIN QUANT: CPT

## 2022-01-01 PROCEDURE — 74177 CT ABD & PELVIS W/CONTRAST: CPT

## 2022-01-01 PROCEDURE — NC001 PR NO CHARGE: Performed by: NURSE PRACTITIONER

## 2022-01-01 PROCEDURE — 99232 SBSQ HOSP IP/OBS MODERATE 35: CPT | Performed by: INTERNAL MEDICINE

## 2022-01-01 PROCEDURE — 83880 ASSAY OF NATRIURETIC PEPTIDE: CPT

## 2022-01-01 PROCEDURE — 77001 FLUOROGUIDE FOR VEIN DEVICE: CPT | Performed by: RADIOLOGY

## 2022-01-01 PROCEDURE — 82805 BLOOD GASES W/O2 SATURATION: CPT | Performed by: INTERNAL MEDICINE

## 2022-01-01 PROCEDURE — 81001 URINALYSIS AUTO W/SCOPE: CPT

## 2022-01-01 PROCEDURE — 94760 N-INVAS EAR/PLS OXIMETRY 1: CPT

## 2022-01-01 PROCEDURE — G1004 CDSM NDSC: HCPCS

## 2022-01-01 PROCEDURE — 96365 THER/PROPH/DIAG IV INF INIT: CPT

## 2022-01-01 PROCEDURE — 87081 CULTURE SCREEN ONLY: CPT | Performed by: INTERNAL MEDICINE

## 2022-01-01 PROCEDURE — 92610 EVALUATE SWALLOWING FUNCTION: CPT

## 2022-01-01 PROCEDURE — 99239 HOSP IP/OBS DSCHRG MGMT >30: CPT | Performed by: INTERNAL MEDICINE

## 2022-01-01 PROCEDURE — 5A1D70Z PERFORMANCE OF URINARY FILTRATION, INTERMITTENT, LESS THAN 6 HOURS PER DAY: ICD-10-PCS | Performed by: INTERNAL MEDICINE

## 2022-01-01 RX ORDER — INSULIN GLARGINE 100 [IU]/ML
5 INJECTION, SOLUTION SUBCUTANEOUS
Status: DISCONTINUED | OUTPATIENT
Start: 2022-01-01 | End: 2022-01-01

## 2022-01-01 RX ORDER — SODIUM CHLORIDE, SODIUM GLUCONATE, SODIUM ACETATE, POTASSIUM CHLORIDE, MAGNESIUM CHLORIDE, SODIUM PHOSPHATE, DIBASIC, AND POTASSIUM PHOSPHATE .53; .5; .37; .037; .03; .012; .00082 G/100ML; G/100ML; G/100ML; G/100ML; G/100ML; G/100ML; G/100ML
50 INJECTION, SOLUTION INTRAVENOUS CONTINUOUS
Status: DISPENSED | OUTPATIENT
Start: 2022-01-01 | End: 2022-01-01

## 2022-01-01 RX ORDER — GABAPENTIN 100 MG/1
100 CAPSULE ORAL 3 TIMES WEEKLY
Status: DISCONTINUED | OUTPATIENT
Start: 2022-01-01 | End: 2022-01-01

## 2022-01-01 RX ORDER — CEFDINIR 300 MG/1
300 CAPSULE ORAL
Qty: 4 CAPSULE | Refills: 0 | Status: SHIPPED | OUTPATIENT
Start: 2022-01-01 | End: 2022-01-01

## 2022-01-01 RX ORDER — SEVELAMER HYDROCHLORIDE 800 MG/1
800 TABLET, FILM COATED ORAL
Status: DISCONTINUED | OUTPATIENT
Start: 2022-01-01 | End: 2022-01-01

## 2022-01-01 RX ORDER — ALBUMIN (HUMAN) 12.5 G/50ML
25 SOLUTION INTRAVENOUS ONCE
Status: COMPLETED | OUTPATIENT
Start: 2022-01-01 | End: 2022-01-01

## 2022-01-01 RX ORDER — DOCUSATE SODIUM 100 MG/1
100 CAPSULE, LIQUID FILLED ORAL 2 TIMES DAILY
Status: DISCONTINUED | OUTPATIENT
Start: 2022-01-01 | End: 2022-01-01

## 2022-01-01 RX ORDER — CHOLECALCIFEROL (VITAMIN D3) 10 MCG
1 TABLET ORAL
Status: DISCONTINUED | OUTPATIENT
Start: 2022-01-01 | End: 2022-01-01 | Stop reason: HOSPADM

## 2022-01-01 RX ORDER — CLOPIDOGREL BISULFATE 75 MG/1
75 TABLET ORAL DAILY
Status: DISCONTINUED | OUTPATIENT
Start: 2022-01-01 | End: 2022-01-01 | Stop reason: HOSPADM

## 2022-01-01 RX ORDER — GABAPENTIN 100 MG/1
100 CAPSULE ORAL
Status: DISCONTINUED | OUTPATIENT
Start: 2022-01-01 | End: 2022-01-01

## 2022-01-01 RX ORDER — LATANOPROST 50 UG/ML
1 SOLUTION/ DROPS OPHTHALMIC
Status: DISCONTINUED | OUTPATIENT
Start: 2022-01-01 | End: 2022-01-01 | Stop reason: HOSPADM

## 2022-01-01 RX ORDER — ASPIRIN 81 MG/1
81 TABLET ORAL DAILY
Status: DISCONTINUED | OUTPATIENT
Start: 2022-01-01 | End: 2022-01-01 | Stop reason: HOSPADM

## 2022-01-01 RX ORDER — MIDODRINE HYDROCHLORIDE 5 MG/1
5 TABLET ORAL 3 TIMES WEEKLY
Status: DISCONTINUED | OUTPATIENT
Start: 2022-01-01 | End: 2022-01-01

## 2022-01-01 RX ORDER — HEPARIN SODIUM 5000 [USP'U]/ML
5000 INJECTION, SOLUTION INTRAVENOUS; SUBCUTANEOUS EVERY 8 HOURS SCHEDULED
Status: DISCONTINUED | OUTPATIENT
Start: 2022-01-01 | End: 2022-01-01 | Stop reason: HOSPADM

## 2022-01-01 RX ORDER — PERPHENAZINE 4 MG/1
4 TABLET, FILM COATED ORAL 3 TIMES DAILY
Status: DISCONTINUED | OUTPATIENT
Start: 2022-01-01 | End: 2022-01-01

## 2022-01-01 RX ORDER — POTASSIUM CHLORIDE 20 MEQ/1
40 TABLET, EXTENDED RELEASE ORAL ONCE
Status: COMPLETED | OUTPATIENT
Start: 2022-01-01 | End: 2022-01-01

## 2022-01-01 RX ORDER — MIDODRINE HYDROCHLORIDE 5 MG/1
5 TABLET ORAL
Status: DISCONTINUED | OUTPATIENT
Start: 2022-01-01 | End: 2022-01-01 | Stop reason: HOSPADM

## 2022-01-01 RX ORDER — NUTRITIONAL SUPPLEMENT
POWDER (GRAM) ORAL
COMMUNITY

## 2022-01-01 RX ORDER — CHOLESTYRAMINE LIGHT 4 G/5.7G
4 POWDER, FOR SUSPENSION ORAL 2 TIMES DAILY
Status: DISCONTINUED | OUTPATIENT
Start: 2022-01-01 | End: 2022-01-01

## 2022-01-01 RX ORDER — PANTOPRAZOLE SODIUM 40 MG/1
40 TABLET, DELAYED RELEASE ORAL
Status: DISCONTINUED | OUTPATIENT
Start: 2022-01-01 | End: 2022-01-01

## 2022-01-01 RX ORDER — CEFAZOLIN SODIUM 1 G/3ML
INJECTION, POWDER, FOR SOLUTION INTRAMUSCULAR; INTRAVENOUS CODE/TRAUMA/SEDATION MEDICATION
Status: COMPLETED | OUTPATIENT
Start: 2022-01-01 | End: 2022-01-01

## 2022-01-01 RX ORDER — MIDODRINE HYDROCHLORIDE 5 MG/1
5 TABLET ORAL ONCE
Status: DISCONTINUED | OUTPATIENT
Start: 2022-01-01 | End: 2022-01-01

## 2022-01-01 RX ORDER — SERTRALINE HYDROCHLORIDE 25 MG/1
25 TABLET, FILM COATED ORAL DAILY
Status: DISCONTINUED | OUTPATIENT
Start: 2022-01-01 | End: 2022-01-01 | Stop reason: HOSPADM

## 2022-01-01 RX ORDER — ATORVASTATIN CALCIUM 10 MG/1
10 TABLET, FILM COATED ORAL DAILY
Status: DISCONTINUED | OUTPATIENT
Start: 2022-01-01 | End: 2022-01-01 | Stop reason: HOSPADM

## 2022-01-01 RX ADMIN — IOHEXOL 100 ML: 350 INJECTION, SOLUTION INTRAVENOUS at 13:59

## 2022-01-01 RX ADMIN — SACUBITRIL AND VALSARTAN 1 TABLET: 24; 26 TABLET, FILM COATED ORAL at 11:14

## 2022-01-01 RX ADMIN — ASPIRIN 81 MG: 81 TABLET, COATED ORAL at 08:02

## 2022-01-01 RX ADMIN — CEFTRIAXONE SODIUM 1000 MG: 10 INJECTION, POWDER, FOR SOLUTION INTRAVENOUS at 21:00

## 2022-01-01 RX ADMIN — PANTOPRAZOLE SODIUM 40 MG: 40 TABLET, DELAYED RELEASE ORAL at 06:30

## 2022-01-01 RX ADMIN — INSULIN GLARGINE 5 UNITS: 100 INJECTION, SOLUTION SUBCUTANEOUS at 21:53

## 2022-01-01 RX ADMIN — INSULIN LISPRO 2 UNITS: 100 INJECTION, SOLUTION INTRAVENOUS; SUBCUTANEOUS at 12:39

## 2022-01-01 RX ADMIN — DOCUSATE SODIUM 100 MG: 100 CAPSULE ORAL at 18:27

## 2022-01-01 RX ADMIN — CHOLESTYRAMINE 4 G: 4 POWDER, FOR SUSPENSION ORAL at 21:00

## 2022-01-01 RX ADMIN — SODIUM CHLORIDE, SODIUM GLUCONATE, SODIUM ACETATE, POTASSIUM CHLORIDE, MAGNESIUM CHLORIDE, SODIUM PHOSPHATE, DIBASIC, AND POTASSIUM PHOSPHATE 50 ML/HR: .53; .5; .37; .037; .03; .012; .00082 INJECTION, SOLUTION INTRAVENOUS at 12:30

## 2022-01-01 RX ADMIN — CEFAZOLIN SODIUM 1000 MG: 1 INJECTION, POWDER, FOR SOLUTION INTRAMUSCULAR; INTRAVENOUS at 13:40

## 2022-01-01 RX ADMIN — HEPARIN SODIUM 5000 UNITS: 5000 INJECTION INTRAVENOUS; SUBCUTANEOUS at 05:42

## 2022-01-01 RX ADMIN — HEPARIN SODIUM 5000 UNITS: 5000 INJECTION INTRAVENOUS; SUBCUTANEOUS at 21:04

## 2022-01-01 RX ADMIN — ASPIRIN 81 MG: 81 TABLET, COATED ORAL at 09:42

## 2022-01-01 RX ADMIN — SACUBITRIL AND VALSARTAN 1 TABLET: 24; 26 TABLET, FILM COATED ORAL at 12:38

## 2022-01-01 RX ADMIN — GABAPENTIN 100 MG: 100 CAPSULE ORAL at 12:39

## 2022-01-01 RX ADMIN — ATORVASTATIN CALCIUM 10 MG: 10 TABLET, FILM COATED ORAL at 08:02

## 2022-01-01 RX ADMIN — NEPHROCAP 1 CAPSULE: 1 CAP ORAL at 15:43

## 2022-01-01 RX ADMIN — SERTRALINE 25 MG: 25 TABLET, FILM COATED ORAL at 08:02

## 2022-01-01 RX ADMIN — PANTOPRAZOLE SODIUM 40 MG: 40 TABLET, DELAYED RELEASE ORAL at 05:55

## 2022-01-01 RX ADMIN — SERTRALINE 25 MG: 25 TABLET, FILM COATED ORAL at 09:42

## 2022-01-01 RX ADMIN — PERPHENAZINE 4 MG: 4 TABLET, FILM COATED ORAL at 21:54

## 2022-01-01 RX ADMIN — DOCUSATE SODIUM 100 MG: 100 CAPSULE ORAL at 21:00

## 2022-01-01 RX ADMIN — POTASSIUM CHLORIDE 40 MEQ: 1500 TABLET, EXTENDED RELEASE ORAL at 11:11

## 2022-01-01 RX ADMIN — PANTOPRAZOLE SODIUM 40 MG: 40 TABLET, DELAYED RELEASE ORAL at 05:32

## 2022-01-01 RX ADMIN — CLOPIDOGREL BISULFATE 75 MG: 75 TABLET ORAL at 11:11

## 2022-01-01 RX ADMIN — CHOLESTYRAMINE 4 G: 4 POWDER, FOR SUSPENSION ORAL at 09:43

## 2022-01-01 RX ADMIN — PERPHENAZINE 4 MG: 4 TABLET, FILM COATED ORAL at 09:43

## 2022-01-01 RX ADMIN — INSULIN GLARGINE 5 UNITS: 100 INJECTION, SOLUTION SUBCUTANEOUS at 21:00

## 2022-01-01 RX ADMIN — HEPARIN SODIUM 5000 UNITS: 5000 INJECTION INTRAVENOUS; SUBCUTANEOUS at 13:28

## 2022-01-01 RX ADMIN — HEPARIN SODIUM 5000 UNITS: 5000 INJECTION INTRAVENOUS; SUBCUTANEOUS at 05:32

## 2022-01-01 RX ADMIN — PERPHENAZINE 4 MG: 4 TABLET, FILM COATED ORAL at 11:14

## 2022-01-01 RX ADMIN — ASPIRIN 81 MG: 81 TABLET, COATED ORAL at 11:13

## 2022-01-01 RX ADMIN — PERPHENAZINE 4 MG: 4 TABLET, FILM COATED ORAL at 21:16

## 2022-01-01 RX ADMIN — INSULIN GLARGINE 5 UNITS: 100 INJECTION, SOLUTION SUBCUTANEOUS at 21:16

## 2022-01-01 RX ADMIN — DOCUSATE SODIUM 100 MG: 100 CAPSULE ORAL at 09:42

## 2022-01-01 RX ADMIN — PERPHENAZINE 4 MG: 4 TABLET, FILM COATED ORAL at 15:44

## 2022-01-01 RX ADMIN — ATORVASTATIN CALCIUM 10 MG: 10 TABLET, FILM COATED ORAL at 11:13

## 2022-01-01 RX ADMIN — LATANOPROST 1 DROP: 50 SOLUTION OPHTHALMIC at 21:11

## 2022-01-01 RX ADMIN — HEPARIN SODIUM 5000 UNITS: 5000 INJECTION INTRAVENOUS; SUBCUTANEOUS at 05:55

## 2022-01-01 RX ADMIN — ALBUMIN (HUMAN) 25 G: 0.25 INJECTION, SOLUTION INTRAVENOUS at 10:36

## 2022-01-01 RX ADMIN — METOPROLOL TARTRATE 25 MG: 25 TABLET, FILM COATED ORAL at 08:02

## 2022-01-01 RX ADMIN — CEFTRIAXONE SODIUM 1000 MG: 10 INJECTION, POWDER, FOR SOLUTION INTRAVENOUS at 19:52

## 2022-01-01 RX ADMIN — HEPARIN SODIUM 5000 UNITS: 5000 INJECTION INTRAVENOUS; SUBCUTANEOUS at 06:30

## 2022-01-01 RX ADMIN — ATORVASTATIN CALCIUM 10 MG: 10 TABLET, FILM COATED ORAL at 09:43

## 2022-01-01 RX ADMIN — CHOLESTYRAMINE 4 G: 4 POWDER, FOR SUSPENSION ORAL at 11:12

## 2022-01-01 RX ADMIN — HEPARIN SODIUM 5000 UNITS: 5000 INJECTION INTRAVENOUS; SUBCUTANEOUS at 14:18

## 2022-01-01 RX ADMIN — PERPHENAZINE 4 MG: 4 TABLET, FILM COATED ORAL at 21:00

## 2022-01-01 RX ADMIN — HEPARIN SODIUM 5000 UNITS: 5000 INJECTION INTRAVENOUS; SUBCUTANEOUS at 21:54

## 2022-01-01 RX ADMIN — PERPHENAZINE 4 MG: 4 TABLET, FILM COATED ORAL at 18:28

## 2022-01-01 RX ADMIN — MIDODRINE HYDROCHLORIDE 5 MG: 5 TABLET ORAL at 10:36

## 2022-01-01 RX ADMIN — DOCUSATE SODIUM 100 MG: 100 CAPSULE ORAL at 17:27

## 2022-01-01 RX ADMIN — HEPARIN SODIUM 5000 UNITS: 5000 INJECTION INTRAVENOUS; SUBCUTANEOUS at 13:52

## 2022-01-01 RX ADMIN — HEPARIN SODIUM 5000 UNITS: 5000 INJECTION INTRAVENOUS; SUBCUTANEOUS at 21:16

## 2022-01-01 RX ADMIN — CEFTRIAXONE SODIUM 1000 MG: 10 INJECTION, POWDER, FOR SOLUTION INTRAVENOUS at 12:06

## 2022-01-01 RX ADMIN — SODIUM CHLORIDE, SODIUM LACTATE, POTASSIUM CHLORIDE, AND CALCIUM CHLORIDE 500 ML: .6; .31; .03; .02 INJECTION, SOLUTION INTRAVENOUS at 12:42

## 2022-01-01 RX ADMIN — HEPARIN SODIUM 5000 UNITS: 5000 INJECTION INTRAVENOUS; SUBCUTANEOUS at 21:11

## 2022-01-01 RX ADMIN — CLOPIDOGREL BISULFATE 75 MG: 75 TABLET ORAL at 09:43

## 2022-01-01 RX ADMIN — CHOLESTYRAMINE 4 G: 4 POWDER, FOR SUSPENSION ORAL at 18:27

## 2022-01-01 RX ADMIN — CLOPIDOGREL BISULFATE 75 MG: 75 TABLET ORAL at 08:01

## 2022-01-01 RX ADMIN — SACUBITRIL AND VALSARTAN 1 TABLET: 24; 26 TABLET, FILM COATED ORAL at 21:00

## 2022-01-01 RX ADMIN — SERTRALINE 25 MG: 25 TABLET, FILM COATED ORAL at 11:15

## 2022-01-01 RX ADMIN — NEPHROCAP 1 CAPSULE: 1 CAP ORAL at 18:27

## 2022-01-01 RX ADMIN — SACUBITRIL AND VALSARTAN 1 TABLET: 24; 26 TABLET, FILM COATED ORAL at 18:28

## 2022-01-01 RX ADMIN — IOHEXOL 20 ML: 350 INJECTION, SOLUTION INTRAVENOUS at 10:07

## 2022-01-01 RX ADMIN — CEFTRIAXONE SODIUM 1000 MG: 10 INJECTION, POWDER, FOR SOLUTION INTRAVENOUS at 12:30

## 2022-01-01 RX ADMIN — NEPHROCAP 1 CAPSULE: 1 CAP ORAL at 17:07

## 2022-01-03 NOTE — PROGRESS NOTES
[facility]  [address]  [phone]  [place]  Code 31 (STR)      NAME: Josy Rand  AGE: 73 y o  SEX: female CODE STATUS: Full Code    DATE OF ENCOUNTER: 1/3/2022    Assessment and Plan     1  ESRD (end stage renal disease) on dialysis [facility])  Assessment & Plan:    Lab Results   Component Value Date    EGFR 16 12/20/2021    EGFR 17 12/18/2021    EGFR 24 12/17/2021    CREATININE 2 87 (H) 12/20/2021    CREATININE 2 65 (H) 12/18/2021    CREATININE 2 03 (H) 12/17/2021     · Previously on PD but required transition to HD during recent hospitalization   · Returned from IR today with new Left Perma Cath placed  · Site clean and dry- scant bloody drainage   · Continue HD on Tue/Thus/Sat  · Hold Entresto on Dialysis days   · Outpatient f/u with Nephrology  · General Surgery consult as OP for Removal of PD cath to be scheduled       2  Ambulatory dysfunction  Assessment & Plan:  · Currently requires max assist for bed mobility and transfers  · Continue PT/OT   · Fall Precautions   · Per  patient will transition to LTC at [facility] next week       3  Pressure injury of sacral region, unstageable ([facility] [address] )  Assessment & Plan:  · Pressure ulcer of sacrum, and Right and Left Buttock  · Followed by [facility] CRNP  · Off Load- Turn and Reposition- Incontinence care  · Nutritional supplements   · Follow recommendations of Wound care       4   Encephalopathy  Assessment & Plan:  · Likely multifactorial in setting of Elevated/Uncontrolled Blood Sugars and CT head showing late subacute to chronic appearing right cerebellar small infarct  · CTA neck/brain - negative for stenosis or acute pathology   · EEG with slowing but no focal seizure findings   · Appears more awake and alert on exam today- making good eye contact- still appears weak and tired but just returned from IR from procedure  · Continue Delirium Precautions   · Ensure adequate nutrition/hydration   · Maintain normotensive bps and Good Blood Glucose control       5  Ischemic cardiomyopathy  Assessment & Plan:  · Daily weights, low salt diet  · Entresto on non HD days  · Monitor for fluid overload  · Appears Euvolemic on exam   · Per review of records- patient refuses weights at times       6  Primary hypertension  Assessment & Plan:  · Controlled on exam   · PRN Midodrine prior to HD due to hypotension  · Continue Metoprolol and Entresto on non HD days       7  Type 2 diabetes mellitus with hyperosmolarity without coma, with long-term current use of insulin (HCC)  Assessment & Plan:    Lab Results   Component Value Date    HGBA1C 10 3 (H) 12/06/2021     · Per geriatric guidelines, goal HgA1c is > 7 5 to prevent hypoglycemic event in the older adult with cognitive decline  · Needs better A1c Control   · Recently hospitalized with HHNK  · Fair meal completion   · Due to elevated A1c- Restarted Lantus at 5 units Q HS last visit with better  FBS and post prandial sugars  · Continue SSI with Accuchecks   · Recheck HgbA1c in 3 months ( around 3/7/2022)           All medications and routine orders were reviewed and updated as needed  Chief Complaint     STR follow up visit    History of Present Illness     Ayla Joshua is a 73year old female admitted to [place] on 12/22/2021 for STR  Past Medical Hx including but not limited to ESRD of PD with recent transition to HD, CVA, Pacemaker, DM II, HTN, Depression, Ambulatory dysfunction seen in collaboration with nursing for medical mgmt and STR follow up  Notified by VIEO- patient will transition to LTC next week at [place]  [facility]ed to [facility]  on 12/4/2021 with c/o Altered Mental Status  She was found to be in Hyperglycemic Hyperosmolar Nonketotic coma and was admitted with encephalopathy  She was managed with IV insulin gtt but her encephalopathy continued after Blood glucose correction   Neuro workup revealed CT head with cerebellar CVA acute on chronic MRI could not be performed due to pacemaker  Due to being unable to find placement that would accommodate Peritoneal dialysis she was transitioned over to HD  Patient was consistently hypotensive while inpatient and her amlodipine and furosemide were discontinued  Patient also developed frequent hypoglycemic episodes and her Lantus was discontinued- she was discharged to rehab on SSI only  Rapid Response alerted on 12/17/21 due to unresponsiveness -unsure the cause but thought to be secondary to dialysis disequilibrium syndrome  Patient's mental status improved and was sent to [facility]  Seen and evaluated at bedside  Patient is more awake and alert on exam today  Patient just came back from Right Perma cath placement to left subclavian  Patient denies pain on exam  Patient is Malaysian speaking only  Patient is requesting to call her family  Patient able to follow simple commands  VSS  Afebrile  Per review of SNF records, Patient is eating 1-2 meals per day, consuming  %  Patient is incontinent of bowel and bladder, LBM 1/3/2022  Patient is actively participating in therapy, she requires full mechanical lift for transfers oob  No concerns from nursing at this time  The patient's allergies, past medical, surgical, social and family history were reviewed and unchanged  Review of Systems     Review of Systems   Constitutional: Negative for chills and fever  HENT: Negative for congestion, rhinorrhea and sore throat  Eyes: Negative for pain and visual disturbance  Respiratory: Negative for cough, shortness of breath and wheezing  Cardiovascular: Negative for chest pain and palpitations  Gastrointestinal: Negative for abdominal pain, constipation, diarrhea and nausea  Genitourinary: Negative for decreased urine volume, difficulty urinating, dysuria and hematuria  Musculoskeletal: Negative for arthralgias and back pain  Skin: Negative for color change and rash     Neurological: Positive for weakness  Negative for dizziness, syncope, numbness and headaches  Psychiatric/Behavioral: Negative for dysphoric mood and sleep disturbance  The patient is not nervous/anxious  Objective     Vitals:   Vitals:    01/03/22 1742   BP: 144/75   Pulse: 86   Resp: 18   Temp: (!) 97 2 °F (36 2 °C)   SpO2: 98%         Physical Exam  Vitals and nursing note reviewed  Constitutional:       General: She is not in acute distress  Appearance: Normal appearance  She is obese  She is not ill-appearing  HENT:      Head: Normocephalic and atraumatic  Nose: No congestion or rhinorrhea  Mouth/Throat:      Mouth: Mucous membranes are moist    Eyes:      General: No scleral icterus  Conjunctiva/sclera: Conjunctivae normal       Pupils: Pupils are equal, round, and reactive to light  Cardiovascular:      Rate and Rhythm: Normal rate and regular rhythm  Pulses: Normal pulses  Heart sounds: Normal heart sounds  No murmur heard  Pulmonary:      Effort: Pulmonary effort is normal  No respiratory distress  Breath sounds: Normal breath sounds  No wheezing, rhonchi or rales  Comments: Diminished - poor effort   Abdominal:      General: Bowel sounds are normal  There is no distension  Palpations: Abdomen is soft  There is no mass  Tenderness: There is no abdominal tenderness  Hernia: No hernia is present  Musculoskeletal:         General: No swelling or tenderness  Lymphadenopathy:      Cervical: No cervical adenopathy  Skin:     General: Skin is warm and dry  Coloration: Skin is not pale  Findings: No rash  Comments: Left subclavian Perma Cath in place- scant bloody drainage noted under clear dressing- denies pain    Neurological:      General: No focal deficit present  Mental Status: She is alert  Mental status is at baseline  Motor: Weakness present        Gait: Gait abnormal       Comments: AAOx2-3- Khmer speaking- able to make needs known- good eye contact -follows commands    Psychiatric:         Mood and Affect: Mood normal          Behavior: Behavior normal          Pertinent Laboratory/Diagnostic Studies:   Reviewed in facility chart-stable    Current Medications   Medications reviewed and updated see facility [facility] for details        Current Outpatient Medications:     Acetaminophen 325 MG CAPS, Take 650 mg by mouth every 6 (six) hours as needed, Disp: , Rfl:     aspirin (ECOTRIN LOW STRENGTH) 81 mg EC tablet, Take 81 mg by mouth daily, Disp: , Rfl:     atorvastatin (LIPITOR) 10 mg tablet, Take 1 tablet (10 mg total) by mouth daily, Disp: 30 tablet, Rfl: 0    B Complex-C-Folic Acid (NEPHRO VITAMINS PO), Take 1 tablet by mouth daily, Disp: , Rfl:     calcitriol (ROCALTROL) 0 25 mcg capsule, Take 0 25 mcg by mouth daily, Disp: , Rfl:     cholecalciferol (VITAMIN D3) 1,000 units tablet, Take 2,000 Units by mouth daily, Disp: , Rfl:     cholestyramine sugar free (QUESTRAN LIGHT) 4 g packet, Take 1 packet (4 g total) by mouth 2 (two) times a day, Disp: 60 packet, Rfl: 0    clopidogrel (PLAVIX) 75 mg tablet, Take 75 mg by mouth daily, Disp: , Rfl:     docusate sodium (COLACE) 100 mg capsule, Take 100 mg by mouth 2 (two) times a day, Disp: , Rfl:     Epoetin Colby-epbx (Retacrit) 4000 units/ml, Inject 1 mL under the skin 3 (three) times a week Hold if HGB greater than or equal to 10, Disp: , Rfl:     esomeprazole (NexIUM) 40 MG capsule, Take 40 mg by mouth every morning before breakfast, Disp: , Rfl:     gentamicin (GARAMYCIN) 0 1 % cream, Apply 1 application topically daily, Disp: 15 g, Rfl: 0    insulin glargine (LANTUS) 100 units/mL subcutaneous injection, Inject 5 Units under the skin daily at bedtime, Disp: , Rfl:     latanoprost (XALATAN) 0 005 % ophthalmic solution, Administer 1 drop to both eyes daily at bedtime, Disp: , Rfl:     metoprolol tartrate (LOPRESSOR) 25 mg tablet, Take 25 mg by mouth daily, Disp: , Rfl:   midodrine (PROAMATINE) 5 mg tablet, Take 5 mg by mouth 3 (three) times a week 1 tablet Daily on Tue,Thus,Sat prior to HD, Disp: , Rfl:     nystatin (MYCOSTATIN) cream, Apply 1 application topically daily To buttocks, Disp: , Rfl:     ondansetron (ZOFRAN) 4 mg tablet, Take 4 mg by mouth every 6 (six) hours as needed for nausea or vomiting, Disp: , Rfl:     perphenazine 4 mg tablet, Take 4 mg by mouth 3 (three) times a day With meals, Disp: , Rfl:     sacubitril-valsartan (ENTRESTO) 24-26 MG TABS, Take 1 tablet by mouth 2 (two) times a day Hold morning of dialysis days, Disp: 60 tablet, Rfl: 0    sertraline (ZOLOFT) 25 mg tablet, Take 25 mg by mouth daily, Disp: , Rfl:     Sevelamer Carbonate 0 8 g PACK, Take 1 Package by mouth 3 (three) times a day 1 Packet with meals TID, Disp: , Rfl:      Plan discussed with Dr Silvia Mcgill noted agreement with assessment and plan  Please note:  Voice-recognition software may have been used in the preparation of this document  Occasional wrong word or "sound-alike" substitutions may have occurred due to the inherent limitations of voice recognition software  Interpretation should be guided by SANTINO Stephenson  1/3/2022  5:42 PM

## 2022-01-03 NOTE — DISCHARGE INSTRUCTIONS
Perma-cath Placement   WHAT YOU NEED TO KNOW:   A perma-cath is a catheter placed through a vein into or near your right atrium  Your right atrium is the right upper chamber of your heart  A perma-cath is used for dialysis in an emergency or until a long-term device is ready to use  After your procedure, you will have some pain and swelling on your chest and neck  You may have some bruises on your chest and neck  You may also have 2 dressings, one on your chest and one on your neck  DISCHARGE INSTRUCTIONS:   Call 911 for any of the following:   · You feel lightheaded, short of breath, and have chest pain  · Your catheter comes out   Contact Interventional Radiology at [phone] Viv[place] PATIENTS: Contact Interventional Radiology at [phone]) Yamil Chepe PATIENTS: Contact Interventional Radiology at [phone]) if:  · Blood soaks through your bandage  · You have new swelling in your arm, neck, face, or chest on your right side  · Your catheter gets wet  · Your bruises or pain get worse  · You have a fever or chills  · Persistent nausea or vomiting  · Your incision is red, swollen, or draining pus  · You have questions or concerns about your condition or care  Self-care:       · Resume your normal diet  · Keep your dressings dry  Do not take a shower or swim  You may take a tub bath, but do not get your dressings wet  Water in your wound can cause bacteria to grow and cause an infection  If your dressing gets wet, dry it off and cover it with dry sterile gauze  Call your healthcare provider  Do not use soaps or ointments  · Do not change your dressings  Your healthcare provider or dialysis nurse will change your dressings  Your dressings should stay in place until your healthcare provider removes them  The dressing on your chest will stay as long as you have the catheter in place  The dressing prevents infection  · Do not remove the red and blue caps from the end of your catheter   The caps prevent air from getting into your catheter    Follow up with your healthcare provider as directed: Write down your questions so you remember to ask them during your visits

## 2022-01-05 PROBLEM — L89.150 PRESSURE INJURY OF SACRAL REGION, UNSTAGEABLE (HCC): Status: ACTIVE | Noted: 2022-01-01

## 2022-01-05 PROBLEM — L89.323 PRESSURE INJURY OF LEFT BUTTOCK, STAGE 3 (HCC): Status: ACTIVE | Noted: 2022-01-01

## 2022-01-05 PROBLEM — L89.310 PRESSURE INJURY OF RIGHT BUTTOCK, UNSTAGEABLE (HCC): Status: ACTIVE | Noted: 2022-01-01

## 2022-01-05 NOTE — PATIENT INSTRUCTIONS
Orders Placed This Encounter   Procedures    Wound cleansing and dressings     Wound:  Buttocks and sacrum  Discontinue previous wound order  Cleanse the wound bed with NSS   Apply non-sting skin prep to periwound area  Apply hydrocolloid to wound bed  Frequency : Every other day  and prn for soiling  Offload all wounds  Turn and reposition frequently, maximum of every two hours  Instruct / Assist with weight shifting every 15 - 20 minutes when in chair  Increase protein intake  Monitor for any sign of infection or worsening, inform PCP or patient's primary physician in your facility       Standing Status:   Future     Standing Expiration Date:   1/5/2023

## 2022-01-05 NOTE — ASSESSMENT & PLAN NOTE
Sacrum  - Wound size - 1 2 cm x 1 7 cm x 0 1cm, 100% slough, no obvious sign of infection  - local wound care with hydrolloid  - offload  - recommended to start on Alon, patient have poor appetite  - follow up next week

## 2022-01-05 NOTE — ASSESSMENT & PLAN NOTE
Right buttock  - Wound size - 3 5 cm x 3 5 cm x 0 1cm, with 50% slough, no obvious sign of infection  - local wound care with hydrolloid  - offload  - recommended to start on Alon, patient have poor appetite  - follow up next week

## 2022-01-05 NOTE — ASSESSMENT & PLAN NOTE
Left buttock  - Wound size - 1 0 cm x 1 2 cm x 0 1cm, 100% granulation, no obvious sign of infection  - local wound care with hydrolloid  - offload  - recommended to start on Alon, patient have poor appetite  - follow up next week

## 2022-01-07 NOTE — PROGRESS NOTES
[address] MANAGEMENT   AND HYPERBARIC MEDICINE CENTER       Patient ID: Martín Etienne is a 73 y o  female Date of Birth 1950     Location of Service: [facility]    Performed wound round with: Wound team       REQUIRED DOCUMENTATION:     1  This service was provided via Telemedicine  2  Provider located at [address]   3  TeleMed provider: Lily Colvin [facility]  Identify all parties in room with patient during tele consult:  [facility] wound team  5  Patient was then informed that this was a Telemedicine visit and that the exam was being conducted confidentially over secure lines  My office door was closed  No one else was in the room  Patient acknowledged consent and understanding of privacy and security of the Telemedicine visit, and gave us permission to have the assistant stay in the room in order to assist with the history and to conduct the exam   I/ [facility] eri team informed the patient that I have reviewed their record in Epic and presented the opportunity for them to ask any questions regarding the visit today  The patient agreed to participate  I have spent 20 minutes with patent today in which greater than 50% of this time was spent in counseling/coordination of care regarding Intructions for management, review the previous medical records,  preparing to see the patients, obtain history, ordering treatments, and documenting clinical information in the electronic health record  Televisit performed with audio / video using Eachpal team              Chief Complaint   Patient presents with    Follow Up Wound Care Visit     Right buttock, left buttock, sacrum       Wound Instructions:  No orders of the defined types were placed in this encounter  Allergies  Penicillins      Assessment & Plan:  1   Pressure injury of sacral region, unstageable [facility])  Assessment & Plan:  Sacrum  - Wound size - 1 2 cm x 1 7 cm x 0 1cm, 100% slough, no obvious sign of infection  - local wound care with hydrolloid  - offload  - recommended to start on Alon, patient have poor appetite  - follow up next week        2  Pressure injury of right buttock, unstageable (HCC)  Assessment & Plan:  Right buttock  - Wound size - 3 5 cm x 3 5 cm x 0 1cm, with 50% slough, no obvious sign of infection  - local wound care with hydrolloid  - offload  - recommended to start on Alon, patient have poor appetite  - follow up next week        3  Pressure injury of left buttock, stage 3 (HCC)  Assessment & Plan:  Left buttock  - Wound size - 1 0 cm x 1 2 cm x 0 1cm, 100% granulation, no obvious sign of infection  - local wound care with hydrolloid  - offload  - recommended to start on Alon, patient have poor appetite  - follow up next week      4  Ambulatory dysfunction  Assessment & Plan:  On STR             Subjective: This is 73year old female referred to our service for wounds on the right buttock, left buttock, and sacrum  Patient have complex medical history including but not limited to  ESRD of PD with recent transition to HD, CVA, Pacemaker, DM II, HTN, Depression, Ambulatory dysfunction   Received patient in bed, seems comfortable  As per medical record review, patient have wounds on the buttocks and sacrum prior to admission at [facility]  Patient have poor appetite, consumed an average of 25% of her meals  Currently under the care of RD, on protein supplement  Patient also currently on hemodialysis treatment  Review of Systems   Constitutional: Negative  HENT: Negative  Respiratory: Negative  Cardiovascular: Negative  Gastrointestinal: Negative  Genitourinary:        Incontinent   Musculoskeletal: Positive for gait problem  Skin: Positive for wound  See HPI   Neurological: Negative for dizziness and headaches  Psychiatric/Behavioral: Negative for behavioral problems  Objective: There were no vitals taken for this visit      Physical Exam  Constitutional:       Appearance: Normal appearance  HENT:      Head: Normocephalic and atraumatic  Nose: Nose normal    Pulmonary:      Effort: Pulmonary effort is normal    Abdominal:      Tenderness: There is no abdominal tenderness  Genitourinary:     Comments: incontinent  Musculoskeletal:      Comments: LROM   Skin:     Findings: Lesion present  Comments: Wound # 1   Location and Type: left buttock pressure   Current measurements: 1 0 cm x 1 2 cm x 0 1cm   tunneling: none   Wound bed: 100% gran   Drainage: none   Odor: none   S/S of infection: none   Treatment: hydrocolloid   Wound # 2   Location and Type: right buttock pressure   Current measurements: 3 5 cm x 3 5 cm x 0 1cm   tunneling: none   Wound bed: 50% slough, 50% gran   Drainage: none   Odor: none   S/S of infection: none   Treatment: hydrocolloid   Wound # 3   Location and Type: sacrum pressure   Current measurements: 1 2cm x 1 7 cm x 0 1cm   tunneling: none   Wound bed: 100% slough   Drainage: none   Odor: none   S/S of infection: none      Neurological:      Mental Status: She is alert  Gait: Gait abnormal    Psychiatric:         Behavior: Behavior normal               Procedures           Patient's care was coordinated with nursing facility staff  Recent vitals, labs and updated medications were reviewed on EMR or chart system of facility   Past Medical, surgical, social, medication and allergy history and patient's previous records were reviewed and updated as appropriate:     Patient Active Problem List   Diagnosis    HHNC (hyperglycemic hyperosmolar nonketotic coma) ([facility] )    ESRD (end stage renal disease) on dialysis ([facility] )    Type 2 diabetes mellitus ([facility] )    Depression    GERD (gastroesophageal reflux disease)    HLD (hyperlipidemia)    Pacemaker    HTN (hypertension)    Hypernatremia    Encephalopathy    CVA (cerebral vascular accident) ([facility] )    CAD (coronary artery disease)    Ischemic cardiomyopathy    Stroke-like symptoms    Abnormal CT of the chest    Ambulatory dysfunction    Dysphagia    Pressure injury of left buttock, stage 3 ([facility])    Pressure injury of right buttock, stage 3 ([facility])    Pressure injury of sacral region, unstageable ([facility] )     Past Medical History:   Diagnosis Date    Anemia due to chronic kidney disease     Atherosclerotic heart disease of native coronary artery without angina pectoris     Cardiac pacemaker     Dependence on renal dialysis ([facility] )     Dysphagia     End stage renal disease ([facility])     GERD (gastroesophageal reflux disease)     Hyperlipidemia     Hypertensive chronic kidney disease with stage 5 chronic kidney disease or end stage renal disease ([facility])     Hypotension 12/8/2021    Major depressive disorder, recurrent ([facility])     MRSA (methicillin resistant Staphylococcus aureus)     Pressure ulcer of sacral region, unstageable ([place] [address] )     Psychiatric disorder     anxiety    Type 2 diabetes mellitus with chronic kidney disease ([place] [address] )     Type 2 diabetes mellitus with diabetic nephropathy ([facility])     Type 2 diabetes mellitus with diabetic peripheral angiopathy without gangrene ([place] [address] )     Type 2 diabetes mellitus with hyperglycemia ([facility])     Type 2 diabetes mellitus with hypoglycemia ([facility])     Unspecified Escherichia coli (E  coli) as the cause of diseases classified elsewhere      Past Surgical History:   Procedure Laterality Date    IR TUNNELED CENTRAL LINE CHECK/CHANGE/REPOSITION  1/3/2022    IR TUNNELED DIALYSIS CATHETER PLACEMENT  12/10/2021     Social History     Socioeconomic History    Marital status:       Spouse name: None    Number of children: None    Years of education: None    Highest education level: None   Occupational History    None   Tobacco Use    Smoking status: Unknown If Ever Smoked    Smokeless tobacco: None   Substance and Sexual Activity    Alcohol use: None    Drug use: None    Sexual activity: None   Other Topics Concern    None   Social History Narrative    None Social Determinants of Health     Financial Resource Strain: Not on file   Food Insecurity: No Food Insecurity    Worried About Running Out of Food in the Last Year: Never true    Kiran of Food in the Last Year: Never true   Transportation Needs: No Transportation Needs    Lack of Transportation (Medical): No    Lack of Transportation (Non-Medical):  No   Physical Activity: Not on file   Stress: Not on file   Social Connections: Not on file   Intimate Partner Violence: Not on file   Housing Stability: Unknown    Unable to Pay for Housing in the Last Year: No    Number of Places Lived in the Last Year: Not on file    Unstable Housing in the Last Year: No        Current Outpatient Medications:     Acetaminophen 325 MG CAPS, Take 650 mg by mouth every 6 (six) hours as needed, Disp: , Rfl:     aspirin (ECOTRIN LOW STRENGTH) 81 mg EC tablet, Take 81 mg by mouth daily, Disp: , Rfl:     atorvastatin (LIPITOR) 10 mg tablet, Take 1 tablet (10 mg total) by mouth daily, Disp: 30 tablet, Rfl: 0    B Complex-C-Folic Acid (NEPHRO VITAMINS PO), Take 1 tablet by mouth daily, Disp: , Rfl:     calcitriol (ROCALTROL) 0 25 mcg capsule, Take 0 25 mcg by mouth daily, Disp: , Rfl:     cholecalciferol (VITAMIN D3) 1,000 units tablet, Take 2,000 Units by mouth daily, Disp: , Rfl:     cholestyramine sugar free (QUESTRAN LIGHT) 4 g packet, Take 1 packet (4 g total) by mouth 2 (two) times a day, Disp: 60 packet, Rfl: 0    clopidogrel (PLAVIX) 75 mg tablet, Take 75 mg by mouth daily, Disp: , Rfl:     docusate sodium (COLACE) 100 mg capsule, Take 100 mg by mouth 2 (two) times a day, Disp: , Rfl:     Epoetin Colby-epbx (Retacrit) 4000 units/ml, Inject 1 mL under the skin 3 (three) times a week Hold if HGB greater than or equal to 10, Disp: , Rfl:     esomeprazole (NexIUM) 40 MG capsule, Take 40 mg by mouth every morning before breakfast, Disp: , Rfl:     gentamicin (GARAMYCIN) 0 1 % cream, Apply 1 application topically daily, Disp: 15 g, Rfl: 0    insulin glargine (LANTUS) 100 units/mL subcutaneous injection, Inject 5 Units under the skin daily at bedtime, Disp: , Rfl:     latanoprost (XALATAN) 0 005 % ophthalmic solution, Administer 1 drop to both eyes daily at bedtime, Disp: , Rfl:     metoprolol tartrate (LOPRESSOR) 25 mg tablet, Take 25 mg by mouth daily, Disp: , Rfl:     midodrine (PROAMATINE) 5 mg tablet, Take 5 mg by mouth 3 (three) times a week 1 tablet Daily on Tue,Thus,Sat prior to HD, Disp: , Rfl:     nystatin (MYCOSTATIN) cream, Apply 1 application topically daily To buttocks, Disp: , Rfl:     ondansetron (ZOFRAN) 4 mg tablet, Take 4 mg by mouth every 6 (six) hours as needed for nausea or vomiting, Disp: , Rfl:     perphenazine 4 mg tablet, Take 4 mg by mouth 3 (three) times a day With meals, Disp: , Rfl:     sacubitril-valsartan (ENTRESTO) 24-26 MG TABS, Take 1 tablet by mouth 2 (two) times a day Hold morning of dialysis days, Disp: 60 tablet, Rfl: 0    sertraline (ZOLOFT) 25 mg tablet, Take 25 mg by mouth daily, Disp: , Rfl:     Sevelamer Carbonate 0 8 g PACK, Take 1 Package by mouth 3 (three) times a day 1 Packet with meals TID, Disp: , Rfl:   History reviewed  No pertinent family history  Coordination of Care: [facility] aware of the treatment plan    Patient / Staff education : Patient / Staff was given education on sign of infection and pressure ulcer prevention  Patient/ Staff verbalized understanding     Follow up :  Return in about 1 week (around 1/12/2022)  Voice-recognition software may have been used in the preparation of this document  Occasional wrong word or "sound-alike" substitutions may have occurred due to the inherent limitations of voice recognition software  Interpretation should be guided by context        SANTINO Krause

## 2022-01-08 NOTE — ASSESSMENT & PLAN NOTE
· Daily weights, low salt diet  · Entresto on non HD days  · Monitor for fluid overload  · Appears Euvolemic on exam   · Per review of records- patient refuses weights at times

## 2022-01-08 NOTE — ASSESSMENT & PLAN NOTE
Lab Results   Component Value Date    HGBA1C 10 3 (H) 12/06/2021     · Per geriatric guidelines, goal HgA1c is > 7 5 to prevent hypoglycemic event in the older adult with cognitive decline  · Needs better A1c Control   · Recently hospitalized with HHNK  · Fair meal completion   · Due to elevated A1c- Restarted Lantus at 5 units Q HS last visit with better  FBS and post prandial sugars  · Continue SSI with Accuchecks   · Recheck HgbA1c in 3 months ( around 3/7/2022)

## 2022-01-08 NOTE — ASSESSMENT & PLAN NOTE
Lab Results   Component Value Date    EGFR 16 12/20/2021    EGFR 17 12/18/2021    EGFR 24 12/17/2021    CREATININE 2 87 (H) 12/20/2021    CREATININE 2 65 (H) 12/18/2021    CREATININE 2 03 (H) 12/17/2021     · Previously on PD but required transition to HD during recent hospitalization   · Returned from IR today with new Left Perma Cath placed  · Site clean and dry- scant bloody drainage   · Continue HD on Tue/Thus/Sat  · Hold Entresto on Dialysis days   · Outpatient f/u with Nephrology  · General Surgery consult as OP for Removal of PD cath to be scheduled

## 2022-01-08 NOTE — ASSESSMENT & PLAN NOTE
· Currently requires max assist for bed mobility and transfers  · Continue PT/OT   · Fall Precautions   · Per  patient will transition to LTC at [facility] next week

## 2022-01-08 NOTE — ASSESSMENT & PLAN NOTE
· Controlled on exam   · PRN Midodrine prior to HD due to hypotension  · Continue Metoprolol and Entresto on non HD days

## 2022-01-08 NOTE — ASSESSMENT & PLAN NOTE
· Pressure ulcer of sacrum, and Right and Left Buttock  · Followed by [facility] CR[facility]  · Off Load- Turn and Reposition- Incontinence care  · Nutritional supplements   · Follow recommendations of Wound care

## 2022-01-08 NOTE — ASSESSMENT & PLAN NOTE
· Likely multifactorial in setting of Elevated/Uncontrolled Blood Sugars and CT head showing late subacute to chronic appearing right cerebellar small infarct  · CTA neck/brain - negative for stenosis or acute pathology   · EEG with slowing but no focal seizure findings   · Appears more awake and alert on exam today- making good eye contact- still appears weak and tired but just returned from IR from procedure  · Continue Delirium Precautions   · Ensure adequate nutrition/hydration   · Maintain normotensive bps and Good Blood Glucose control

## 2022-01-12 PROBLEM — L89.313 PRESSURE INJURY OF RIGHT BUTTOCK, STAGE 3 (HCC): Status: ACTIVE | Noted: 2022-01-01

## 2022-01-12 NOTE — ASSESSMENT & PLAN NOTE
Sacrum  - Wound size decreased, 1 x 1 x 0 1 cm ,compared to  last week measurement of - 1 2 cm x 1 7 cm x 0 1cm, 100% slough, no obvious sign of infection  - local wound care  - changed to medihoney for autolytic debridement  - offload  - follow up next week

## 2022-01-12 NOTE — PATIENT INSTRUCTIONS
Orders Placed This Encounter   Procedures    Wound cleansing and dressings     Wound:  Buttocks  Discontinue previous wound order  Cleanse the wound bed with NSS   Apply zguard to wound bed  Frequency : daily  and prn for soiling    Location: Sacrum  Cleanse with NSS  Apply medihoney gel to wound bed and cover with bordered foam  Daily and prn for soiling    Offload all wounds  Turn and reposition frequently, maximum of every two hours  Instruct / Assist with weight shifting every 15 - 20 minutes when in chair  Increase protein intake  Monitor for any sign of infection or worsening, inform PCP or patient's primary physician in your facility       Standing Status:   Future     Standing Expiration Date:   1/12/2023

## 2022-01-12 NOTE — ASSESSMENT & PLAN NOTE
Right buttock  - Wound size decrease, 3 5 x 1 x 0 1 cm compared to last week measurement of 3 5 cm x 3 5 cm x 0 1cm, with 100% epithelial, no obvious sign of infection  - local wound care with zguard  - offload  - still have poor appetite, under the care of RD  - follow up next week

## 2022-01-12 NOTE — LETTER
Patient:  Martín Etienne   1950           SANTINO Colvin saw Martín Etienne for a wound care visit on 1/12/2022  See below for information relating to this visit  Chief Complaint   Patient presents with    Follow Up Wound Care Visit     Left buttock, right buttock, sacrum        Assessment/Plan:  1  Pressure injury of sacral region, unstageable ([address] )  Assessment & Plan:  Sacrum  - Wound size decreased, 1 x 1 x 0 1 cm ,compared to  last week measurement of - 1 2 cm x 1 7 cm x 0 1cm, 100% slough, no obvious sign of infection  - local wound care  - changed to medihoney for autolytic debridement  - offload  - follow up next week       Orders:  -     Wound cleansing and dressings; Future    2  Pressure injury of right buttock, stage 3 (HCC)  Assessment & Plan:  Right buttock  - Wound size decrease, 3 5 x 1 x 0 1 cm compared to last week measurement of 3 5 cm x 3 5 cm x 0 1cm, with 100% epithelial, no obvious sign of infection  - local wound care with zguard  - offload  - still have poor appetite, under the care of RD  - follow up next week       Orders:  -     Wound cleansing and dressings; Future    3  Pressure injury of left buttock, stage 3 (HCC)  Assessment & Plan:  Left buttock  - healed  - z guard for prevention    Orders:  -     Wound cleansing and dressings; Future    4   Ambulatory dysfunction  Assessment & Plan:  On STR             Orders:  Martín Etienne  1950  Orders Placed This Encounter   Procedures    Wound cleansing and dressings     Wound:  Buttocks  Discontinue previous wound order  Cleanse the wound bed with NSS   Apply zguard to wound bed  Frequency : daily  and prn for soiling    Location: Sacrum  Cleanse with NSS  Apply medihoney gel to wound bed and cover with bordered foam  Daily and prn for soiling    Offload all wounds  Turn and reposition frequently, maximum of every two hours  Instruct / Assist with weight shifting every 15 - 20 minutes when in chair  Increase protein intake  Monitor for any sign of infection or worsening, inform PCP or patient's primary physician in your facility  Standing Status:   Future     Standing Expiration Date:   1/12/2023         Follow Up:  Return in about 1 week (around 1/19/2022)  Collin Sandoval hours are 8:00 am - 4:30 pm Monday through Friday  The center phone number is [phone]  You can also contact me directly thru my email at [facility]  [email or web address] or thru tiger text  If it is an emergency, please contact the PCP or patient's attending physician in your facility       Sincerely,    Electronically signed by SANTINO Sepulveda    Patient : Rosanne Fearing    1950

## 2022-01-16 NOTE — ASSESSMENT & PLAN NOTE
Lab Results   Component Value Date    HGBA1C 10 3 (H) 12/06/2021     · Per geriatric guidelines, goal HgA1c is > 7 5 to prevent hypoglycemic event in the older adult with cognitive decline  · Needs better A1c Control   · Recently hospitalized with HHNK  · Fair meal completion   · Continue Lantus at 5 units Q HS   · Continue SSI with Accuchecks   · BS Goal 140-180  · Currently ranging 120-280 with one BS reading at 71 in past 2 weeks - improved   · Recheck HgbA1c in 3 months ( around 3/7/2022)

## 2022-01-16 NOTE — ASSESSMENT & PLAN NOTE
· As evidenced on CT head on 12/7/2021 revealing left cerebellar subacute CVA verses chronic infarct   · Patient unable to get MRI due to [facility] pacemaker placed in 2017   · Continue aspirin Plavix and statin   · Patient is awake alert able to make needs known is fully dependent for care and requires a mechanical lift out of bed 0  · Outpatient follow-up with Neurology

## 2022-01-16 NOTE — PROGRESS NOTES
[facility]  [address]  [phone]  Protection  Code 31 (STR)      NAME: Angel Balderas  AGE: 73 y o  SEX: female CODE STATUS: Full Code    DATE OF ENCOUNTER: 1/14/2022    Assessment and Plan     1  Type 2 diabetes mellitus with hyperosmolarity without coma, with long-term current use of insulin (HCC)  Assessment & Plan:    Lab Results   Component Value Date    HGBA1C 10 3 (H) 12/06/2021     · Per geriatric guidelines, goal HgA1c is > 7 5 to prevent hypoglycemic event in the older adult with cognitive decline  · Needs better A1c Control   · Recently hospitalized with HHNK  · Fair meal completion   · Continue Lantus at 5 units Q HS   · Continue SSI with Accuchecks   · BS Goal 140-180  · Currently ranging 120-280 with one BS reading at 71 in past 2 weeks - improved   · Recheck HgbA1c in 3 months ( around 3/7/2022)        2  Cerebrovascular accident (CVA), unspecified mechanism ([address] )  Assessment & Plan:  · As evidenced on CT head on 12/7/2021 revealing left cerebellar subacute CVA verses chronic infarct   · Patient unable to get MRI due to [facility] pacemaker placed in 2017   · Continue aspirin Plavix and statin   · Patient is awake alert able to make needs known is fully dependent for care and requires a mechanical lift out of bed 0  · Outpatient follow-up with Neurology      3  Primary hypertension  Assessment & Plan:  · Controlled on exam   · PRN Midodrine prior to HD due to hypotension  · Continue Metoprolol and Entresto on non HD days       4  Ischemic cardiomyopathy  Assessment & Plan:  · Daily weights, low salt diet  · Entresto on non HD days  · Monitor for fluid overload  · Appears Euvolemic on exam   · Per review of records- patient refuses weights at times       5   ESRD (end stage renal disease) on dialysis [facility])  Assessment & Plan:    Lab Results   Component Value Date    EGFR 16 12/20/2021    EGFR 17 12/18/2021    EGFR 24 12/17/2021    CREATININE 2 87 (H) 12/20/2021 CREATININE 2 65 (H) 12/18/2021    CREATININE 2 03 (H) 12/17/2021     · Previously on PD but required transition to HD during recent hospitalization   · Returned from IR today with new Left Perma Cath placed  · Site clean and dry- scant bloody drainage   · Continue HD on Tue/Thus/Sat  · Hold Entresto on Dialysis days   · Outpatient f/u with Nephrology  · General Surgery consult as OP for Removal of PD cath to be scheduled          All medications and routine orders were reviewed and updated as needed  Chief Complaint     STR follow up visit    History of Present Illness     Vaelriy Chacko is a 73year old female admitted to [facility] on 12/22/2021 for STR, she has now transitioned to LTC at [facility]  Past Medical Hx including but not limited to ESRD of PD with recent transition to HD, CVA, Pacemaker, DM II, HTN, Depression, Ambulatory dysfunction seen in collaboration with nursing for medical mgmt and follow up visit  Presented to [place] [id number]  on 12/4/2021 with c/o Altered Mental Status  She was found to be in Hyperglycemic Hyperosmolar Nonketotic coma and was admitted with encephalopathy  She was managed with IV insulin gtt  Her mentation did not improve with glucose correction and Neuro workup revealed CT head with cerebellar CVA acute on chronic MRI could not be performed due to pacemaker  Due to being unable to find placement that would accommodate Peritoneal dialysis she was transitioned over to HD  Patient was consistently hypotensive while inpatient and her amlodipine and furosemide were discontinued  Patient also developed frequent hypoglycemic episodes and her Lantus was discontinued- she was discharged to rehab on SSI only  Rapid Response alerted on 12/17/21 due to unresponsiveness -unsure the cause but thought to be secondary to dialysis disequilibrium syndrome  Patient's mental status improved and she was deemed stable for d/c to rehab       Patient has consistently been drowsy on my exams, but easily arousable  She is mostly Greek speaking, able to make her needs known, however ROS is limited due to language barrier and recent CVA  Seen and evaluated at bedside  She has a Right Perma cath  to left subclavian is covered with dressing, She still has a PD cath to [facility], she will need f/u with general surgery for removal  Patient denies pain on exam    VSS  Afebrile  Per review of SNF records, Patient is eating 1-2 meals per day, consuming  %  Patient is incontinent of bowel and bladder, LBM 1/13/2022  Patient is dependent for care, requires full mechanical lift for transfers oob  No concerns from nursing at this time  The patient's allergies, past medical, surgical, social and family history were reviewed and unchanged  Review of Systems     Review of Systems   Constitutional: Positive for fatigue  Negative for chills and fever  HENT: Negative for congestion, rhinorrhea and sore throat  Eyes: Negative for pain and visual disturbance  Respiratory: Negative for cough, shortness of breath and wheezing  Cardiovascular: Negative for chest pain and palpitations  Gastrointestinal: Negative for abdominal pain, constipation, diarrhea and nausea  Genitourinary: Negative for decreased urine volume, difficulty urinating, dysuria and hematuria  Musculoskeletal: Negative for arthralgias and back pain  Skin: Negative for color change and rash  Neurological: Positive for weakness  Negative for dizziness, syncope, numbness and headaches  Psychiatric/Behavioral: Negative for dysphoric mood and sleep disturbance  The patient is not nervous/anxious  Objective     Vitals:   Vitals:    01/14/22 1117   BP: 133/66   Pulse: 82   Resp: 18   Temp: (!) 97 4 °F (36 3 °C)   SpO2: 96%         Physical Exam  Vitals and nursing note reviewed  Constitutional:       General: She is sleeping  She is not in acute distress  Appearance: Normal appearance  She is obese   She is not ill-appearing  HENT:      Head: Normocephalic and atraumatic  Nose: No congestion or rhinorrhea  Mouth/Throat:      Mouth: Mucous membranes are moist    Eyes:      General: No scleral icterus  Conjunctiva/sclera: Conjunctivae normal       Pupils: Pupils are equal, round, and reactive to light  Cardiovascular:      Rate and Rhythm: Normal rate and regular rhythm  Pulses: Normal pulses  Heart sounds: Normal heart sounds  No murmur heard  Pulmonary:      Effort: Pulmonary effort is normal  No respiratory distress  Breath sounds: Normal breath sounds  No wheezing, rhonchi or rales  Comments: Diminished - poor effort   Abdominal:      General: Bowel sounds are normal  There is no distension  Palpations: Abdomen is soft  There is no mass  Tenderness: There is no abdominal tenderness  Hernia: No hernia is present  Musculoskeletal:         General: No swelling or tenderness  Lymphadenopathy:      Cervical: No cervical adenopathy  Skin:     General: Skin is warm and dry  Coloration: Skin is not pale  Findings: No rash  Comments: Left subclavian Perma Cath in place- scant bloody drainage noted under clear dressing- denies pain    Neurological:      General: No focal deficit present  Mental Status: She is easily aroused  Mental status is at baseline  Motor: Weakness present  Gait: Gait abnormal       Comments: AAOx2-3- French speaking- able to make needs known- good eye contact -follows commands    Psychiatric:         Mood and Affect: Mood normal          Behavior: Behavior normal          Pertinent Laboratory/Diagnostic Studies:   Reviewed in facility chart-stable    Current Medications   Medications reviewed and updated see facility [facility] for details        Current Outpatient Medications:     Acetaminophen 325 MG CAPS, Take 650 mg by mouth every 6 (six) hours as needed, Disp: , Rfl:     aspirin (ECOTRIN LOW STRENGTH) 81 mg EC tablet, Take 81 mg by mouth daily, Disp: , Rfl:     atorvastatin (LIPITOR) 10 mg tablet, Take 1 tablet (10 mg total) by mouth daily, Disp: 30 tablet, Rfl: 0    B Complex-C-Folic Acid (NEPHRO VITAMINS PO), Take 1 tablet by mouth daily, Disp: , Rfl:     calcitriol (ROCALTROL) 0 25 mcg capsule, Take 0 25 mcg by mouth daily, Disp: , Rfl:     cholecalciferol (VITAMIN D3) 1,000 units tablet, Take 2,000 Units by mouth daily, Disp: , Rfl:     cholestyramine sugar free (QUESTRAN LIGHT) 4 g packet, Take 1 packet (4 g total) by mouth 2 (two) times a day, Disp: 60 packet, Rfl: 0    clopidogrel (PLAVIX) 75 mg tablet, Take 75 mg by mouth daily, Disp: , Rfl:     docusate sodium (COLACE) 100 mg capsule, Take 100 mg by mouth 2 (two) times a day, Disp: , Rfl:     Epoetin Col[place]-epbx (Retacrit) 4000 units/ml, Inject 1 mL under the skin 3 (three) times a week Hold if HGB greater than or equal to 10, Disp: , Rfl:     esomeprazole (NexIUM) 40 MG capsule, Take 40 mg by mouth every morning before breakfast, Disp: , Rfl:     gentamicin (GARAMYCIN) 0 1 % cream, Apply 1 application topically daily, Disp: 15 g, Rfl: 0    insulin glargine (LANTUS) 100 units/mL subcutaneous injection, Inject 5 Units under the skin daily at bedtime, Disp: , Rfl:     latanoprost (XALATAN) 0 005 % ophthalmic solution, Administer 1 drop to both eyes daily at bedtime, Disp: , Rfl:     metoprolol tartrate (LOPRESSOR) 25 mg tablet, Take 25 mg by mouth daily, Disp: , Rfl:     midodrine (PROAMATINE) 5 mg tablet, Take 5 mg by mouth 3 (three) times a week 1 tablet Daily on Tue,Thus,Sat prior to HD, Disp: , Rfl:     nystatin (MYCOSTATIN) cream, Apply 1 application topically daily To buttocks, Disp: , Rfl:     ondansetron (ZOFRAN) 4 mg tablet, Take 4 mg by mouth every 6 (six) hours as needed for nausea or vomiting, Disp: , Rfl:     perphenazine 4 mg tablet, Take 4 mg by mouth 3 (three) times a day With meals, Disp: , Rfl:     sacubitril-valsartan (ENTRESTO) 24-26 MG TABS, Take 1 tablet by mouth 2 (two) times a day Hold morning of dialysis days, Disp: 60 tablet, Rfl: 0    sertraline (ZOLOFT) 25 mg tablet, Take 25 mg by mouth daily, Disp: , Rfl:     Sevelamer Carbonate 0 8 g PACK, Take 1 Package by mouth 3 (three) times a day 1 Packet with meals TID, Disp: , Rfl:      Plan discussed with Dr Gregorio Lauren noted agreement with assessment and plan  Please note:  Voice-recognition software may have been used in the preparation of this document  Occasional wrong word or "sound-alike" substitutions may have occurred due to the inherent limitations of voice recognition software  Interpretation should be guided by context           SANTINO Lewis  1/14/2022  11:03 AM

## 2022-01-17 PROBLEM — R77.8 ELEVATED TROPONIN: Status: ACTIVE | Noted: 2022-01-01

## 2022-01-17 PROBLEM — R19.7 DIARRHEA: Status: ACTIVE | Noted: 2022-01-01

## 2022-01-17 PROBLEM — N39.0 UTI (URINARY TRACT INFECTION): Status: ACTIVE | Noted: 2022-01-01

## 2022-01-17 NOTE — ED ATTENDING ATTESTATION
1/17/2022  I, Ema Salcido MD, saw and evaluated the patient  I have discussed the patient with the resident/non-physician practitioner and agree with the resident's/non-physician practitioner's findings, Plan of Care, and MDM as documented in the resident's/non-physician practitioner's note, except where noted  All available labs and Radiology studies were reviewed  I was present for key portions of any procedure(s) performed by the resident/non-physician practitioner and I was immediately available to provide assistance  At this point I agree with the current assessment done in the Emergency Department  I have conducted an independent evaluation of this patient a history and physical is as follows:    Patient is a 62-year-old female presents to the emergency department for evaluation after having been found hypotensive  Medical transported arranged for her and EMS noted that her blood pressure was low and heart rate elevated prompting change in plan to ED visit  At the time of my evaluation patient reports feeling good  She does admit to some discomfort in her back  She denies awareness of any fevers nor presence of any chest discomfort, nasal congestion, cough or shortness of breath  She does indicate she has abdominal pain sometimes    When questioned if she has been eating well she states sometimes but then indicates that she has not over the last couple of days  She relates that she has not feet felt up to doing this  She endorses having had diarrhea for a long time-well over a week  She denies having appreciated any blood in this  Past medical history significant for hypertension, CVA with some resultant aphasia, ischemic cardiomyopathy with EF of 35% and ESRD for which she recently transitioned from peritoneal dialysis to hemodialysis  She does regularly receive midodrine around time of dialysis  She is on lactulose       On exam patient is elderly & frail appearing laying on her side w/ legs contracted  She does not appear to be in distress  Mucous membranes are moist   Heart sounds regular  Breaths are shallow  No wheezing, rhonchi or rales appreciated  No CVA tenderness  There are 3 small sacral wounds each approximately 1 cm in diameter without surrounding erythema  No active bleeding or discharge  These are all quite shallow less than a couple of mm  Loose stool appreciated and collected for testing  Abdomen is very thin and soft with normoactive bowel sounds  Mild diffuse tenderness is present  Peritoneal catheter is present without surrounding erythema or increased discomfort  Dual-lumen dialysis catheter appreciated in the left anterior chest wall  Dressing clean dry and intact  No surrounding erythema  Lower extremities nontender non edematous  Temperature elevated at 99 6  Uncertain whether hypotension is  a result of volume depletion, infection or cardiogenic in nature  Providing small fluid bolus, obtaining sepsis labs and EKG/troponin  ED Course   Labs reveal nonelevated WBC & baseline chemistry without electrolyte disturbance  Although temperature likely elevated from baseline she does not meed SIRS criteria  Troponin elevated to similar range as on prior admission - chronic & BNP elevation suspected to be chronic as well (she does not appear fluid overloaded)  Given mild abd tn work-up included CT A/P which revealed air in the bladder & R kidney  She has not had recent instrumentation to our knowledge raising suspicion for UTI  UA findings subsequently concerning for this & abx initiated along w/ SLIM admission        Critical Care Time  Procedures

## 2022-01-17 NOTE — ED PROVIDER NOTES
History  Chief Complaint   Patient presents with    Hypotension     pt arrived per EMS, pt originally picked up for transport to appt  upon arrival EMS noted hypotention approx 80/40 and tachycardia in the 110s     Leticia Lua is a 30-year-old female with a past medical history significant for ESRD on hemodialysis, ischemic cardiomyopathy with an ejection fraction of 35-40%, cerebrovascular accident, and hypertension who presents emergency department chief complaint of hypotension per EMS  Patient is unable a baseline to give a story given her history of CVA  Prior to Admission Medications   Prescriptions Last Dose Informant Patient Reported? Taking?    Acetaminophen 325 MG CAPS   Yes Yes   Sig: Take 650 mg by mouth every 6 (six) hours as needed   B Complex-C-Folic Acid (NEPHRO VITAMINS PO)   Yes Yes   Sig: Take 1 tablet by mouth daily   Epoetin Colby-epbx (Retacrit) 4000 units/ml Not Taking at Unknown time  Yes No   Sig: Inject 1 mL under the skin 3 (three) times a week Hold if HGB greater than or equal to 10   Patient not taking: Reported on 1/17/2022    Nutritional Supplements (Alon) POWD   Yes Yes   Sig: Take by mouth   Sevelamer Carbonate 0 8 g PACK   Yes Yes   Sig: Take 1 Package by mouth 3 (three) times a day 1 Packet with meals TID   aspirin (ECOTRIN LOW STRENGTH) 81 mg EC tablet   Yes Yes   Sig: Take 81 mg by mouth daily   atorvastatin (LIPITOR) 10 mg tablet   No Yes   Sig: Take 1 tablet (10 mg total) by mouth daily   calcitriol (ROCALTROL) 0 25 mcg capsule Not Taking at Unknown time  Yes No   Sig: Take 0 25 mcg by mouth daily   Patient not taking: Reported on 1/17/2022    cholecalciferol (VITAMIN D3) 1,000 units tablet   Yes Yes   Sig: Take 2,000 Units by mouth daily   cholestyramine sugar free (QUESTRAN LIGHT) 4 g packet   No Yes   Sig: Take 1 packet (4 g total) by mouth 2 (two) times a day   clopidogrel (PLAVIX) 75 mg tablet   Yes Yes   Sig: Take 75 mg by mouth daily   docusate sodium (COLACE) 100 mg capsule   Yes Yes   Sig: Take 100 mg by mouth 2 (two) times a day   esomeprazole (NexIUM) 40 MG capsule   Yes Yes   Sig: Take 40 mg by mouth every morning before breakfast   gentamicin (GARAMYCIN) 0 1 % cream   No Yes   Sig: Apply 1 application topically daily   glucagon (GLUCAGEN) 1 mg injection   Yes Yes   Sig: Inject 1 mg into a muscle once As needed for low blood sugar   insulin glargine (LANTUS) 100 units/mL subcutaneous injection   Yes Yes   Sig: Inject 5 Units under the skin daily at bedtime   insulin lispro (HumaLOG) 100 units/mL injection   Yes Yes   Sig: Inject under the skin Sliding scale coverage     latanoprost (XALATAN) 0 005 % ophthalmic solution   Yes Yes   Sig: Administer 1 drop to both eyes daily at bedtime   metoprolol tartrate (LOPRESSOR) 25 mg tablet   Yes Yes   Sig: Take 25 mg by mouth daily   midodrine (PROAMATINE) 5 mg tablet   Yes Yes   Sig: Take 5 mg by mouth 3 (three) times a week 1 tablet Daily on Tue,Thus,Sat prior to HD   nystatin (MYCOSTATIN) cream Not Taking at Unknown time  Yes No   Sig: Apply 1 application topically daily To buttocks   Patient not taking: Reported on 1/17/2022    ondansetron (ZOFRAN) 4 mg tablet   Yes Yes   Sig: Take 4 mg by mouth every 6 (six) hours as needed for nausea or vomiting   perphenazine 4 mg tablet   Yes Yes   Sig: Take 4 mg by mouth 3 (three) times a day With meals   sacubitril-valsartan (ENTRESTO) 24-26 MG TABS   No Yes   Sig: Take 1 tablet by mouth 2 (two) times a day Hold morning of dialysis days   sertraline (ZOLOFT) 25 mg tablet   Yes Yes   Sig: Take 25 mg by mouth daily      Facility-Administered Medications: None       Past Medical History:   Diagnosis Date    Anemia due to chronic kidney disease     Atherosclerotic heart disease of native coronary artery without angina pectoris     Cardiac pacemaker     Dependence on renal dialysis (HCC)     Dysphagia     End stage renal disease (HCC)     GERD (gastroesophageal reflux disease)     Hyperlipidemia     Hypertensive chronic kidney disease with stage 5 chronic kidney disease or end stage renal disease (HCC)     Hypotension 12/8/2021    Major depressive disorder, recurrent (HCC)     MRSA (methicillin resistant Staphylococcus aureus)     Pressure ulcer of sacral region, unstageable ([facility] )     Psychiatric disorder     anxiety    Type 2 diabetes mellitus with chronic kidney disease (HCC)     Type 2 diabetes mellitus with diabetic nephropathy (HCC)     Type 2 diabetes mellitus with diabetic peripheral angiopathy without gangrene ([facility] )     Type 2 diabetes mellitus with hyperglycemia (HCC)     Type 2 diabetes mellitus with hypoglycemia ([facility])     Unspecified Escherichia coli (E  coli) as the cause of diseases classified elsewhere        Past Surgical History:   Procedure Laterality Date    IR TUNNELED CENTRAL LINE CHECK/CHANGE/REPOSITION  1/3/2022    IR TUNNELED DIALYSIS CATHETER PLACEMENT  12/10/2021       History reviewed  No pertinent family history  I have reviewed and agree with the history as documented      E-Cigarette/Vaping     E-Cigarette/Vaping Substances     Social History     Tobacco Use    Smoking status: Unknown If Ever Smoked    Smokeless tobacco: Never Used   Substance Use Topics    Alcohol use: Not on file    Drug use: Not on file        Review of Systems   Unable to perform ROS: Other       Physical Exam  ED Triage Vitals   Temperature Pulse Respirations Blood Pressure SpO2   01/17/22 1139 01/17/22 1135 01/17/22 1135 01/17/22 1140 01/17/22 1135   99 6 °F (37 6 °C) (!) 113 18 96/55 96 %      Temp Source Heart Rate Source Patient Position - Orthostatic VS BP Location FiO2 (%)   01/17/22 1139 01/17/22 1430 01/17/22 1430 01/17/22 1430 --   Rectal Monitor Lying Right arm       Pain Score       --                    Orthostatic Vital Signs  Vitals:    01/18/22 1045 01/18/22 1421 01/18/22 2335 01/19/22 0700   BP: 117/63 127/70 115/56 135/61   Pulse: (!) 111 99 (!) 110 (!) 116 Patient Position - Orthostatic VS: Lying Lying Lying Lying       Physical Exam  Vitals and nursing note reviewed  Constitutional:       Appearance: She is ill-appearing  HENT:      Head: Normocephalic and atraumatic  Eyes:      General:         Right eye: No discharge  Left eye: No discharge  Pupils: Pupils are equal, round, and reactive to light  Cardiovascular:      Rate and Rhythm: Tachycardia present  Heart sounds: No murmur heard  Pulmonary:      Effort: Pulmonary effort is normal    Abdominal:      General: Abdomen is flat  Tenderness: There is no guarding  Musculoskeletal:      Comments: Patient contracted and with minimal UE/LE movement     Skin:     General: Skin is warm  Capillary Refill: Capillary refill takes less than 2 seconds  Neurological:      Mental Status: She is disoriented           ED Medications  Medications   aspirin (ECOTRIN LOW STRENGTH) EC tablet 81 mg (81 mg Oral Given 1/19/22 0942)   atorvastatin (LIPITOR) tablet 10 mg (10 mg Oral Given 1/19/22 0943)   insulin glargine (LANTUS) subcutaneous injection 5 Units 0 05 mL (5 Units Subcutaneous Given 1/18/22 2116)   cholestyramine sugar free (QUESTRAN LIGHT) packet 4 g (4 g Oral Given 1/19/22 0943)   clopidogrel (PLAVIX) tablet 75 mg (75 mg Oral Given 1/19/22 0943)   docusate sodium (COLACE) capsule 100 mg (100 mg Oral Given 1/19/22 0942)   pantoprazole (PROTONIX) EC tablet 40 mg (40 mg Oral Given 1/19/22 0532)   latanoprost (XALATAN) 0 005 % ophthalmic solution 1 drop (1 drop Both Eyes Not Given 1/18/22 2116)   b complex-vitamin C-folic acid (NEPHROCAPS) capsule 1 capsule (1 capsule Oral Given 1/18/22 1827)   perphenazine tablet 4 mg (4 mg Oral Given 1/19/22 0943)   sertraline (ZOLOFT) tablet 25 mg (25 mg Oral Given 1/19/22 0942)   sacubitril-valsartan (ENTRESTO) 24-26 MG per tablet 1 tablet (1 tablet Oral Given 1/18/22 1828)   heparin (porcine) subcutaneous injection 5,000 Units (5,000 Units Subcutaneous Given 1/19/22 0532)   ceftriaxone (ROCEPHIN) 1 g/50 mL in dextrose IVPB (1,000 mg Intravenous New Bag 1/1952)   insulin lispro (HumaLOG) 100 units/mL subcutaneous injection 1-5 Units (1 Units Subcutaneous Not Given 1/19/22 0944)   midodrine (PROAMATINE) tablet 5 mg (has no administration in time range)   lactated ringers bolus 500 mL (0 mL Intravenous Stopped 1/17/22 1342)   iohexol (OMNIPAQUE) 350 MG/ML injection (SINGLE-DOSE) 100 mL (100 mL Intravenous Given 1/17/22 1359)   potassium chloride (K-DUR,KLOR-CON) CR tablet 40 mEq (40 mEq Oral Given 1/18/22 1111)       Diagnostic Studies  Results Reviewed     Procedure Component Value Units Date/Time    Urine culture [[id number]]  (Abnormal)  (Susceptibility) Collected: 01/17/22 1624    Lab Status: Final result Specimen: Urine, Straight Cath Updated: 01/19/22 0714     Urine Culture >100,000 cfu/ml Klebsiella pneumoniae    Susceptibility     Klebsiella pneumoniae (1)     Antibiotic Interpretation Microscan   Method Status    ZID Performed  Yes  GLENNA Final    Amoxicillin + Clavulanate Susceptible <=8/4 ug/ml GLENNA Final    Ampicillin ($$) Resistant >16 00 ug/ml GLENNA Final    Ampicillin + Sulbactam ($) Susceptible <=4/2 ug/ml GLENNA Final    Aztreonam ($$$)  Susceptible <=4 ug/ml GLENNA Final    Cefazolin ($) Susceptible <=2 00 ug/ml GLENNA Final    Ciprofloxacin ($)  Susceptible <=0 25 ug/ml GLENNA Final    Gentamicin ($$) Susceptible <=2 ug/ml GLENNA Final    Levofloxacin ($) Susceptible <=0 50 ug/ml GLENNA Final    Minocycline Resistant >8 ug/ml GLENNA Final    Nitrofurantoin Susceptible <=32 ug/ml GLENNA Final    Tetracycline Resistant >8 ug/ml GLENNA Final    Tobramycin ($) Susceptible <=2 ug/ml GLENNA Final    Trimethoprim + Sulfamethoxazole ($$$) Resistant >2/38 ug/ml GLENNA Final                   Blood culture #1 [[id number]] Collected: 01/17/22 1227    Lab Status: Preliminary result Specimen: Blood from Arm, Left Updated: 01/18/22 1501     Blood Culture No Growth at 24 hrs  Blood culture #2 [[id number]] Collected: 01/17/22 1236    Lab Status: Preliminary result Specimen: Blood from Arm, Right Updated: 01/18/22 1501     Blood Culture No Growth at 24 hrs      Stool Enteric Bacterial Panel by PCR [[id number]]  (Normal) Collected: 01/17/22 1340    Lab Status: Final result Specimen: Stool from Per Rectum Updated: 01/18/22 1057     Salmonella sp PCR None Detected     Shigella sp/Enteroinvasive E  coli (EIEC) PCR None Detected     Campylobacter sp (jejuni and coli) PCR None Detected     Shiga toxin 1/Shiga toxin 2 genes PCR None Detected    Fingerstick Glucose (POCT) [[id number]]  (Normal) Collected: 01/18/22 0823    Lab Status: Final result Updated: 01/18/22 0825     POC Glucose 81 mg/dl     Comprehensive metabolic panel [[id number]]  (Abnormal) Collected: 01/18/22 0635    Lab Status: Final result Specimen: Blood from Arm, Left Updated: 01/18/22 0803     Sodium 138 mmol/L      Potassium 3 2 mmol/L      Chloride 106 mmol/L      CO2 20 mmol/L      ANION GAP 12 mmol/L      BUN 68 mg/dL      Creatinine 2 94 mg/dL      Glucose 79 mg/dL      Calcium 9 5 mg/dL      Corrected Calcium 11 1 mg/dL      AST 32 U/L      ALT 28 U/L      Alkaline Phosphatase 118 U/L      Total Protein 5 4 g/dL      Albumin 2 0 g/dL      Total Bilirubin 0 21 mg/dL      eGFR 15 ml/min/1 73sq m     Narrative:      [place] guidelines for Chronic Kidney Disease (CKD):     Stage 1 with normal or high GFR (GFR > 90 mL/min/1 73 square meters)    Stage 2 Mild CKD (GFR = 60-89 mL/min/1 73 square meters)    Stage 3A Moderate CKD (GFR = 45-59 mL/min/1 73 square meters)    Stage 3B Moderate CKD (GFR = 30-44 mL/min/1 73 square meters)    Stage 4 Severe CKD (GFR = 15-29 mL/min/1 73 square meters)    Stage 5 End Stage CKD (GFR <15 mL/min/1 73 square meters)  Note: GFR calculation is accurate only with a steady state creatinine    CBC and differential [[id number]]  (Abnormal) Collected: 01/18/22 0635    Lab Status: Final result Specimen: Blood from Arm, Left Updated: 01/18/22 0648     WBC 6 32 Thousand/uL      RBC 3 15 Million/uL      Hemoglobin 9 4 g/dL      Hematocrit 28 5 %      MCV 91 fL      MCH 29 8 pg      MCHC 33 0 g/dL      RDW 16 1 %      MPV 11 1 fL      Platelets 263 Thousands/uL      nRBC 0 /100 WBCs      Neutrophils Relative 48 %      Immat GRANS % 1 %      Lymphocytes Relative 42 %      Monocytes Relative 8 %      Eosinophils Relative 1 %      Basophils Relative 0 %      Neutrophils Absolute 3 07 Thousands/µL      Immature Grans Absolute 0 03 Thousand/uL      Lymphocytes Absolute 2 67 Thousands/µL      Monocytes Absolute 0 50 Thousand/µL      Eosinophils Absolute 0 03 Thousand/µL      Basophils Absolute 0 02 Thousands/µL     Clostridium difficile toxin by PCR with EIA [[id number]]  (Normal) Collected: 01/17/22 1340    Lab Status: Final result Specimen: Stool from Per Rectum Updated: 01/18/22 0627     C difficile toxin by PCR Negative    HS Troponin I 4hr [[id number]]  (Abnormal) Collected: 01/17/22 1628    Lab Status: Final result Specimen: Blood from Arm, Left Updated: 01/17/22 1740     hs TnI 4hr 287 ng/L      Delta 4hr hsTnI 10 ng/L     Urine Microscopic [[id number]]  (Abnormal) Collected: 01/17/22 1624    Lab Status: Final result Specimen: Urine, Straight Cath Updated: 01/17/22 1650     RBC, UA 10-20 /hpf      WBC, UA Innumerable /hpf      Epithelial Cells Occasional /hpf      Bacteria, UA Innumerable /hpf      OTHER OBSERVATIONS Transitional Epithelial Cells    UA w Reflex to Microscopic w Reflex to Culture [[id number]]  (Abnormal) Collected: 01/17/22 1624    Lab Status: Final result Specimen: Urine, Straight Cath Updated: 01/17/22 1635     Color, UA Yellow     Clarity, UA Cloudy     Specific Griffith, UA 1 010     pH, UA 6 0     Leukocytes, UA Large     Nitrite, UA Negative     Protein, UA 30 (1+) mg/dl      Glucose, UA Negative mg/dl      Ketones, UA Negative mg/dl      Urobilinogen, UA 0 2 E U /dl Bilirubin, UA Negative     Blood, UA Large    HS Troponin I 2hr [[id number]]  (Abnormal) Collected: 01/17/22 1433    Lab Status: Final result Specimen: Blood from Arm, Left Updated: 01/17/22 1559     hs TnI 2hr 259 ng/L      Delta 2hr hsTnI -18 ng/L     COVID/FLU/RSV - 2 hour TAT [[id number]]  (Normal) Collected: 01/17/22 1233    Lab Status: Final result Specimen: Nares from Nose Updated: 01/17/22 1345     SARS-CoV-2 Negative     INFLUENZA A PCR Negative     INFLUENZA B PCR Negative     RSV PCR Negative    Narrative:      FOR PEDIATRIC PATIENTS - copy/paste COVID Guidelines URL to browser: [email or web address]  [employer]    SARS-CoV-2 assay is a Nucleic Acid Amplification assay intended for the  qualitative detection of nucleic acid from SARS-CoV-2 in nasopharyngeal  swabs  Results are for the presumptive identification of SARS-CoV-2 RNA  Positive results are indicative of infection with SARS-CoV-2, the virus  causing COVID-19, but do not rule out bacterial infection or co-infection  with other viruses  Laboratories within the [place] and its  territories are required to report all positive results to the appropriate  public health authorities  Negative results do not preclude SARS-CoV-2  infection and should not be used as the sole basis for treatment or other  patient management decisions  Negative results must be combined with  clinical observations, patient history, and epidemiological information  This test has not been FDA cleared or approved  This test has been authorized by FDA under an Emergency Use Authorization  (EUA)  This test is only authorized for the duration of time the  declaration that circumstances exist justifying the authorization of the  emergency use of an in vitro diagnostic tests for detection of SARS-CoV-2  virus and/or diagnosis of COVID-19 infection under section 564(b)(1) of  the Act, 21 U  S C  021PJS-7(F)(6), unless the authorization is terminated  or revoked sooner  The test has been validated but independent review by FDA  and CLIA is pending  Test performed using [employer]pert: This RT-PCR assay targets N2,  a region unique to SARS-CoV-2  A conserved region in the E-gene was chosen  for pan-Sarbecovirus detection which includes SARS-CoV-2      HS Troponin 0hr (reflex protocol) [[id number]]  (Abnormal) Collected: 01/17/22 1227    Lab Status: Final result Specimen: Blood from Arm, Left Updated: 01/17/22 1335     hs TnI 0hr 277 ng/L     NT-BNP PRO [[id number]]  (Abnormal) Collected: 01/17/22 1227    Lab Status: Final result Specimen: Blood from Arm, Left Updated: 01/17/22 1321     NT-proBNP 15,532 pg/mL     Comprehensive metabolic panel [[id number]]  (Abnormal) Collected: 01/17/22 1227    Lab Status: Final result Specimen: Blood from Arm, Left Updated: 01/17/22 1317     Sodium 140 mmol/L      Potassium 3 3 mmol/L      Chloride 107 mmol/L      CO2 22 mmol/L      ANION GAP 11 mmol/L      BUN 58 mg/dL      Creatinine 2 75 mg/dL      Glucose 152 mg/dL      Calcium 9 6 mg/dL      Corrected Calcium 11 1 mg/dL      AST 38 U/L      ALT 35 U/L      Alkaline Phosphatase 128 U/L      Total Protein 5 7 g/dL      Albumin 2 1 g/dL      Total Bilirubin 0 26 mg/dL      eGFR 16 ml/min/1 73sq m     Narrative:      National Kid[employer] guidelines for Chronic Kidney Disease (CKD):     Stage 1 with normal or high GFR (GFR > 90 mL/min/1 73 square meters)    Stage 2 Mild CKD (GFR = 60-89 mL/min/1 73 square meters)    Stage 3A Moderate CKD (GFR = 45-59 mL/min/1 73 square meters)    Stage 3B Moderate CKD (GFR = 30-44 mL/min/1 73 square meters)    Stage 4 Severe CKD (GFR = 15-29 mL/min/1 73 square meters)    Stage 5 End Stage CKD (GFR <15 mL/min/1 73 square meters)  Note: GFR calculation is accurate only with a steady state creatinine    Lactic acid [[id number]]  (Normal) Collected: 01/17/22 1227    Lab Status: Final result Specimen: Blood from Arm, Left Updated: 01/17/22 1316     LACTIC ACID 1 9 mmol/L     Narrative:      Result may be elevated if tourniquet was used during collection  CBC and differential [[id number]]  (Abnormal) Collected: 01/17/22 1227    Lab Status: Final result Specimen: Blood from Arm, Left Updated: 01/17/22 1255     WBC 6 77 Thousand/uL      RBC 3 31 Million/uL      Hemoglobin 9 6 g/dL      Hematocrit 30 5 %      MCV 92 fL      MCH 29 0 pg      MCHC 31 5 g/dL      RDW 16 1 %      MPV 11 6 fL      Platelets 632 Thousands/uL      nRBC 0 /100 WBCs      Neutrophils Relative 51 %      Immat GRANS % 0 %      Lymphocytes Relative 43 %      Monocytes Relative 6 %      Eosinophils Relative 0 %      Basophils Relative 0 %      Neutrophils Absolute 3 36 Thousands/µL      Immature Grans Absolute 0 03 Thousand/uL      Lymphocytes Absolute 2 93 Thousands/µL      Monocytes Absolute 0 41 Thousand/µL      Eosinophils Absolute 0 02 Thousand/µL      Basophils Absolute 0 02 Thousands/µL     Fingerstick Glucose (POCT) [[id number]]  (Abnormal) Collected: 01/17/22 1253    Lab Status: Final result Updated: 01/17/22 1254     POC Glucose 173 mg/dl                  CT abdomen pelvis with contrast   Final Result by Soila Saleem MD (01/17 1441)            Air in the bladder could be due to infection or recent instrumentation  Recommend clinical correlation and further evaluation with urinalysis  Droplet of air in the right kidney likely related to air in the bladder  Diverticulosis without diverticulitis  The study was marked in [facility] for notification  Workstation performed: [id number]         XR chest 1 view portable   Final Result by Jewels Olmos MD (01/17 1259)      No acute cardiopulmonary disease                    Workstation performed: [id number]               Procedures  Procedures      ED Course                             SBIRT 20yo+      Most Recent Value   SBIRT (24 yo +)    In order to provide better care to our patients, we are screening all of our patients for alcohol and drug use  Would it be okay to ask you these screening questions? Unable to answer at this time Filed at: 01/17/2022 2850                MDM  Number of Diagnoses or Management Options  ESRD (end stage renal disease) on dialysis [facility]): established and worsening  UTI (urinary tract infection): new and requires workup  Diagnosis management comments: -Lili Hardwick is a pleasant 70y F who presents to the emergency department for evaluation  -HPI is limited on this patient as patient appears weak and has hx CVA  -laboratory examination significant for elevated Cr, BUN, BNP, trop, UA indicative of UTI  -this patient will require admission to hospital for further management and hemodialysis tomorrow per her usual schedule  -patient admitted       Amount and/or Complexity of Data Reviewed  Clinical lab tests: ordered and reviewed  Tests in the medicine section of CPT®: ordered and reviewed  Decide to obtain previous medical records or to obtain history from someone other than the patient: yes  Review and summarize past medical records: yes  Discuss the patient with other providers: yes  Independent visualization of images, tracings, or specimens: yes        Disposition  Final diagnoses:   UTI (urinary tract infection)   ESRD (end stage renal disease) on dialysis [facility])     Time reflects when diagnosis was documented in both MDM as applicable and the Disposition within this note     Time User Action Codes Description Comment    1/17/2022  5:05 PM Gemma Route Add [N39 0] UTI (urinary tract infection)     1/17/2022  5:05 PM Gemma Route Add [N18 6,  Z99 2] ESRD (end stage renal disease) on dialysis [facility])       ED Disposition     ED Disposition Condition Date/Time Comment    Admit Stable Mon Jan 17, 2022  5:05 PM Case was discussed with MIKE and the patient's admission status was agreed to be Admission Status: inpatient status to the service of Dr Wojciech Paulino  Follow-up Information    None         Current Discharge Medication List      CONTINUE these medications which have NOT CHANGED    Details   Acetaminophen 325 MG CAPS Take 650 mg by mouth every 6 (six) hours as needed      aspirin (ECOTRIN LOW STRENGTH) 81 mg EC tablet Take 81 mg by mouth daily      atorvastatin (LIPITOR) 10 mg tablet Take 1 tablet (10 mg total) by mouth daily  Qty: 30 tablet, Refills: 0    Associated Diagnoses: Stroke-like symptoms      B Complex-C-Folic Acid (NEPHRO VITAMINS PO) Take 1 tablet by mouth daily      cholecalciferol (VITAMIN D3) 1,000 units tablet Take 2,000 Units by mouth daily      cholestyramine sugar free (QUESTRAN LIGHT) 4 g packet Take 1 packet (4 g total) by mouth 2 (two) times a day  Qty: 60 packet, Refills: 0    Associated Diagnoses: CAD (coronary artery disease); Ischemic cardiomyopathy      clopidogrel (PLAVIX) 75 mg tablet Take 75 mg by mouth daily      docusate sodium (COLACE) 100 mg capsule Take 100 mg by mouth 2 (two) times a day      esomeprazole (NexIUM) 40 MG capsule Take 40 mg by mouth every morning before breakfast      gentamicin (GARAMYCIN) 0 1 % cream Apply 1 application topically daily  Qty: 15 g, Refills: 0    Associated Diagnoses: ESRD on peritoneal dialysis (HCC)      glucagon (GLUCAGEN) 1 mg injection Inject 1 mg into a muscle once As needed for low blood sugar      insulin glargine (LANTUS) 100 units/mL subcutaneous injection Inject 5 Units under the skin daily at bedtime      insulin lispro (HumaLOG) 100 units/mL injection Inject under the skin Sliding scale coverage        latanoprost (XALATAN) 0 005 % ophthalmic solution Administer 1 drop to both eyes daily at bedtime      metoprolol tartrate (LOPRESSOR) 25 mg tablet Take 25 mg by mouth daily      midodrine (PROAMATINE) 5 mg tablet Take 5 mg by mouth 3 (three) times a week 1 tablet Daily on Tue,Thus,Sat prior to HD      Nutritional Supplements (Alon) POWD Take by mouth      ondansetron (ZOFRAN) 4 mg tablet Take 4 mg by mouth every 6 (six) hours as needed for nausea or vomiting      perphenazine 4 mg tablet Take 4 mg by mouth 3 (three) times a day With meals      sacubitril-valsartan (ENTRESTO) 24-26 MG TABS Take 1 tablet by mouth 2 (two) times a day Hold morning of dialysis days  Qty: 60 tablet, Refills: 0    Associated Diagnoses: CAD (coronary artery disease); Ischemic cardiomyopathy      sertraline (ZOLOFT) 25 mg tablet Take 25 mg by mouth daily      Sevelamer Carbonate 0 8 g PACK Take 1 Package by mouth 3 (three) times a day 1 Packet with meals TID      calcitriol (ROCALTROL) 0 25 mcg capsule Take 0 25 mcg by mouth daily      Epoetin Colby-epbx (Retacrit) 4000 units/ml Inject 1 mL under the skin 3 (three) times a week Hold if HGB greater than or equal to 10      nystatin (MYCOSTATIN) cream Apply 1 application topically daily To buttocks           No discharge procedures on file  PDMP Review     None           ED Provider  Attending physically available and evaluated Phyllis Benavides I managed the patient along with the ED Attending      Electronically Signed by         Mirna Lay DO  01/19/22 0599

## 2022-01-18 NOTE — ASSESSMENT & PLAN NOTE
Lab Results   Component Value Date    EGFR 15 01/18/2022    EGFR 16 01/17/2022    EGFR 16 12/20/2021    CREATININE 2 94 (H) 01/18/2022    CREATININE 2 75 (H) 01/17/2022    CREATININE 2 87 (H) 12/20/2021     - Renal function currently at baseline  - Elevated Troponin and BNP likely due to ESRD  - Continue home nephrocaps, sevelamer  - Continue hemodialysis Tues, Thurs, Saturday  - Continue midodrine 5mg pre-dialysis  - Nephrology following

## 2022-01-18 NOTE — ASSESSMENT & PLAN NOTE
- Patient found to be hypotensive with SBP in 80s, brought to hospital by EMS    - Has improved to low normal range  - Continue to monitor BP  - Restart home metoprolol 25mg

## 2022-01-18 NOTE — ASSESSMENT & PLAN NOTE
Lab Results   Component Value Date    EGFR 16 01/17/2022    EGFR 16 12/20/2021    EGFR 17 12/18/2021    CREATININE 2 75 (H) 01/17/2022    CREATININE 2 87 (H) 12/20/2021    CREATININE 2 65 (H) 12/18/2021     - Renal function currently at baseline  - Elevated Troponin and BNP likely due to ESRD  - Continue home nephrocaps, sevelamer  - Continue hemodialysis Tues, Thurs, Saturday  - Nephrology consulted

## 2022-01-18 NOTE — ASSESSMENT & PLAN NOTE
Patient found to have UA with innumerable bacteria and WBC, and 10-20 RBC   CT abdomen showing air in bladder and kidneys  Patient endorsing dysuria, possible frequency  Lactic acid 1 9    - Ceftriaxone 1g daily, to be given after dialysis on Tues, Thurs, Sat    - Urinary retention protocol, bladder scan as needed  - Monitor VS, CBC  - Follow up blood cultures

## 2022-01-18 NOTE — ASSESSMENT & PLAN NOTE
Patient reports 10 day history of diarrhea, reports 3-5 episodes per day    Denies blood in stool or black tarry stool     - C diff negative  - Stool enteric panel negative

## 2022-01-18 NOTE — ASSESSMENT & PLAN NOTE
Lab Results   Component Value Date    EGFR 15 01/18/2022    EGFR 16 01/17/2022    EGFR 16 12/20/2021    CREATININE 2 94 (H) 01/18/2022    CREATININE 2 75 (H) 01/17/2022    CREATININE 2 87 (H) 12/20/2021     - Renal function currently at baseline  - Elevated Troponin and BNP likely due to ESRD  - Continue home nephrocaps, sevelamer  - Continue hemodialysis Tues, Thurs, Saturday, or as indicated  - Continue midodrine 5mg pre-dialysis  - Nephrology following

## 2022-01-18 NOTE — ASSESSMENT & PLAN NOTE
- Patient found to be hypotensive with SBP in 80s, brought to hospital by EMS    - Has improved to low normal range  - Continue to monitor BP  - Hold home metoprolol for now

## 2022-01-18 NOTE — TREATMENT PLAN
Addendum to earlier note  Tentative plan for hemodialysis treatment 01/19/2022  Will evaluate in the a m

## 2022-01-18 NOTE — ASSESSMENT & PLAN NOTE
History of essential hypertension, on metoprolol and midodrine   - Hold home metoprolol 25  - Midodrine 5mg to be given predialysis  - Monitor BP, can restart as needed

## 2022-01-18 NOTE — ASSESSMENT & PLAN NOTE
Initially 277, two hours later 259 to 287  - Likely due to ESRD and active infection  - EKG negative for acute cardiac event  - Monitor for chest pain, cardiac symptoms

## 2022-01-18 NOTE — PROGRESS NOTES
[facility]  H&P- [address] [facility] 1950, 73 y o  female MRN: [id number]  Unit/Bed#: S -01 Encounter: [id number]  Primary Care Provider: Mario Owen MD   Date and time admitted to hospital: 1/17/2022 11:32 AM    * UTI (urinary tract infection)  Assessment & Plan  Patient found to have UA with innumerable bacteria and WBC, and 10-20 RBC   CT abdomen showing air in bladder and kidneys  Patient endorsing dysuria, frequency  Lactic acid 1 9    - Ceftriaxone 1g daily, (to be given after dialysis when applicable) Day #2  - Urinary retention protocol, bladder scan as needed  - Monitor VS, CBC  - Follow up blood cultures  - Urine cultures growing gram negative rods, follow up for sensitivity      Hypotension  Assessment & Plan  - Patient found to be hypotensive with SBP in 80s, brought to hospital by EMS    - Has improved to low normal range  - Continue to monitor BP  - Hold home metoprolol for now      ESRD (end stage renal disease) on dialysis [facility])  Assessment & Plan  Lab Results   Component Value Date    EGFR 15 01/18/2022    EGFR 16 01/17/2022    EGFR 16 12/20/2021    CREATININE 2 94 (H) 01/18/2022    CREATININE 2 75 (H) 01/17/2022    CREATININE 2 87 (H) 12/20/2021     - Renal function currently at baseline  - Elevated Troponin and BNP likely due to ESRD  - Continue home nephrocaps, sevelamer  - Continue hemodialysis Tues, Thurs, Saturday  - Continue midodrine 5mg pre-dialysis  - Nephrology following    Diarrhea  Assessment & Plan  Patient reports 10 day history of diarrhea, reports 3-5 episodes per day    Denies blood in stool or black tarry stool     - C diff negative  - Stool enteric panel pending      Elevated troponin  Assessment & Plan  Initially 277, two hours later 259 to 287  - Likely due to ESRD and active infection  - EKG negative for acute cardiac event  - Monitor for chest pain, cardiac symptoms    Dysphagia  Assessment & Plan  - Patient on "minced and moist" diet at nursing home   - Started on carbohydrate controlled dysphagia diet  - Monitor intake    Ischemic cardiomyopathy  Assessment & Plan  - No acute changes  - Continue home entresto and plavix    CVA (cerebral vascular accident) ([facility] )  Assessment & Plan  - History of CVA with residual aphasia  - Limited historian, difficulty with  service      Encephalopathy  Assessment & Plan  Continue home perphenazine 4mg TID    HTN (hypertension)  Assessment & Plan  History of essential hypertension, on metoprolol and midodrine   - Hold home metoprolol 25  - Midodrine 5mg to be given predialysis  - Monitor BP, can restart as needed    HLD (hyperlipidemia)  Assessment & Plan  - Continue home lipitor 10mg  - Continue home cholestyramine    GERD (gastroesophageal reflux disease)  Assessment & Plan  Continue protonix 40mg daily    Depression  Assessment & Plan  Continue home sertraline     Type 2 diabetes mellitus ([facility] )  Assessment & Plan  Lab Results   Component Value Date    HGBA1C 10 3 (H) 12/06/2021       Recent Labs     01/17/22  1253 01/18/22  0823 01/18/22  1132   POCGLU 173* 81 89       Blood Sugar Average: Last 72 hrs:  (P) 308 [id number]     - Continue home Lantus 5 U QHS  - Monitor blood glucose, SSI as needed      VTE Pharmacologic Prophylaxis: VTE Score: 5 High Risk (Score >/= 5) - Pharmacological DVT Prophylaxis Ordered: heparin  Sequential Compression Devices Ordered  Patient Centered Rounds: I performed bedside rounds with nursing staff today  Discussions with Specialists or Other Care Team Provider: case management, nursing    Education and Discussions with Family / Patient: Updated  (daughter in law) via phone  Current Length of Stay: 1 day(s)  Current Patient Status: Inpatient   Discharge Plan: pending    Code Status: Level 1 - Full Code    Subjective:   Patient found resting comfortably this morning and reports feeling well   She denies chest pain, shortness of breath, fevers, chills, abdominal pain, nausea, vomiting  She denied foot pain this morning  She endorses sacral pain, likely due to sacral wound  Wound care consult was placed this morning  She had difficulty using  service and was difficult to comprehend at times this morning  Patient was originally scheduled for hemodialysis today, however was seen by Nephrology and determined it was not needed today, and is tentatively scheduled for tomorrow  Urine culture growing gram negative rods  Spoke with patient's daughter-in-law via phone to confirm patient's code status , and to update regarding treatment plan  Objective:     Vitals:   Temp (24hrs), Av 7 °F (36 5 °C), Min:97 7 °F (36 5 °C), Max:97 7 °F (36 5 °C)    Temp:  [97 7 °F (36 5 °C)] 97 7 °F (36 5 °C)  HR:  [102-118] 111  Resp:  [16] 16  BP: (101-135)/(53-65) 117/63  SpO2:  [100 %] 100 %  Body mass index is 19 19 kg/m²  Input and Output Summary (last 24 hours): Intake/Output Summary (Last 24 hours) at 2022 1405  Last data filed at 2022 2200  Gross per 24 hour   Intake 50 ml   Output 75 ml   Net -25 ml       Physical Exam:   Physical Exam  Vitals and nursing note reviewed  Constitutional:       General: She is not in acute distress  Appearance: She is not ill-appearing, toxic-appearing or diaphoretic  HENT:      Head: Normocephalic and atraumatic  Mouth/Throat:      Mouth: Mucous membranes are moist    Eyes:      Conjunctiva/sclera: Conjunctivae normal    Cardiovascular:      Rate and Rhythm: Normal rate and regular rhythm  Pulses: Normal pulses  Heart sounds: No murmur heard  Pulmonary:      Effort: Pulmonary effort is normal  No respiratory distress  Abdominal:      General: There is no distension  Palpations: Abdomen is soft  There is no mass  Tenderness: There is no abdominal tenderness  There is no guarding     Genitourinary:     Comments: No suprapubic tenderness  Musculoskeletal: General: No swelling or tenderness  Right lower leg: No edema  Left lower leg: No edema  Skin:     General: Skin is warm and dry  Neurological:      General: No focal deficit present  Mental Status: She is alert  Additional Data:     Labs:  Results from last 7 days   Lab Units 01/18/22  0635   WBC Thousand/uL 6 32   HEMOGLOBIN g/dL 9 4*   HEMATOCRIT % 28 5*   PLATELETS Thousands/uL 242   NEUTROS PCT % 48   LYMPHS PCT % 42   MONOS PCT % 8   EOS PCT % 1     Results from last 7 days   Lab Units 01/18/22  0635   SODIUM mmol/L 138   POTASSIUM mmol/L 3 2*   CHLORIDE mmol/L 106   CO2 mmol/L 20*   BUN mg/dL 68*   CREATININE mg/dL 2 94*   ANION GAP mmol/L 12   CALCIUM mg/dL 9 5   ALBUMIN g/dL 2 0*   TOTAL BILIRUBIN mg/dL 0 21   ALK PHOS U/L 118*   ALT U/L 28   AST U/L 32   GLUCOSE RANDOM mg/dL 79         Results from last 7 days   Lab Units 01/18/22  1132 01/18/22  0823 01/17/22  1253   POC GLUCOSE mg/dl 89 81 173*         Results from last 7 days   Lab Units 01/17/22  1227   LACTIC ACID mmol/L 1 9       Lines/Drains:  Invasive Devices  Report    Central Venous Catheter Line            CVC Central Lines 12/10/21 Double 39 days          Peripheral Intravenous Line            Peripheral IV 01/17/22 Left Antecubital 1 day    Peripheral IV 01/17/22 Right Hand 1 day          Hemodialysis Catheter            HD Permanent Double Catheter 15 days                Central Line:  Goal for removal: N/A - Chronic PICC             Imaging: pertinent imaging reviewed    Recent Cultures (last 7 days):   Results from last 7 days   Lab Units 01/17/22  1624 01/17/22  1340 01/17/22  1236 01/17/22  1227   BLOOD CULTURE   --   --  Received in Microbiology Lab  Culture in Progress  Received in Microbiology Lab  Culture in Progress     URINE CULTURE  >100,000 cfu/ml Gram Negative Luis Enrique Enteric Like*  --   --   --    C DIFF TOXIN B BY PCR   --  Negative  --   --        Last 24 Hours Medication List:   Current Facility-Administered Medications   Medication Dose Route Frequency Provider Last Rate    aspirin  81 mg Oral Daily Akshat Yoder MD      atorvastatin  10 mg Oral Daily Akshat Yoder MD      b complex-vitamin C-folic acid  1 capsule Oral Daily With Kyra Barkley MD      cefTRIAXone  1,000 mg Intravenous Q24H Akshat Yoder MD Stopped (01/17/22 2200)    cholestyramine sugar free  4 g Oral BID Akshat Yoder MD      clopidogrel  75 mg Oral Daily Akshat Yoder MD      docusate sodium  100 mg Oral BID Akshat Yoder MD      heparin (porcine)  5,000 Units Subcutaneous [facility] Akshat Yoder MD      insulin glargine  5 Units Subcutaneous HS Akshat Yoder MD      insulin lispro  1-5 Units Subcutaneous TID [facility] Akshat Yoder MD      latanoprost  1 drop Both Eyes HS Akshat Yoder MD      midodrine  5 mg Oral Before Dialysis Akshat Yoder MD      pantoprazole  40 mg Oral Early Morning Akshat Yoder MD      perphenazine  4 mg Oral TID Akshat Yoder MD      sacubitril-valsartan  1 tablet Oral BID SANTINO Knox      sertraline  25 mg Oral Daily Akshat Yoder MD          Today, Patient Was Seen By: Akshat Yoder MD    **Please Note: This note may have been constructed using a voice recognition system  **

## 2022-01-18 NOTE — ASSESSMENT & PLAN NOTE
History of essential hypertension, on metoprolol and midodrine   - Midodrine 5mg to be given predialysis  - Monitor BP  -Restart home metoprolol

## 2022-01-18 NOTE — H&P
[facility]  H&P- [address] [facility] 1950, 73 y o  female MRN: [id number]  Unit/Bed#: ED 08 Encounter: [id number]  Primary Care Provider: Brianda Hogan MD   Date and time admitted to hospital: 1/17/2022 11:32 AM    * UTI (urinary tract infection)  Assessment & Plan  Patient found to have UA with innumerable bacteria and WBC, and 10-20 RBC   CT abdomen showing air in bladder and kidneys  Patient endorsing dysuria, possible frequency  Lactic acid 1 9    - Ceftriaxone 1g daily, to be given after dialysis on Tues, Thurs, Sat    - Urinary retention protocol, bladder scan as needed  - Monitor VS, CBC  - Follow up blood cultures      Hypotension  Assessment & Plan  - Patient found to be hypotensive with SBP in 80s, brought to hospital by EMS  - Has improved to low normal range  - Continue to monitor BP  - Hold home metoprolol and midodrine      ESRD (end stage renal disease) on dialysis [facility])  Assessment & Plan  Lab Results   Component Value Date    EGFR 16 01/17/2022    EGFR 16 12/20/2021    EGFR 17 12/18/2021    CREATININE 2 75 (H) 01/17/2022    CREATININE 2 87 (H) 12/20/2021    CREATININE 2 65 (H) 12/18/2021     - Renal function currently at baseline  - Elevated Troponin and BNP likely due to ESRD  - Continue home nephrocaps, sevelamer  - Continue hemodialysis Tues, Thurs, Saturday  - Nephrology consulted    Diarrhea  Assessment & Plan  Patient reports 10 day history of diarrhea, reports 3-5 episodes per day    Denies blood in stool, black tarry stool     - stool enteric panel and C diff sent and pending      Elevated troponin  Assessment & Plan  Initially 277, two hours later 259 to 287  - Likely due to ESRD and active infection  - EKG negative for acute cardiac event  - Monitor for chest pain, cardiac symptoms    Dysphagia  Assessment & Plan  - Patient on "minced and moist" diet at nursing home   - Started on carbohydrate controlled dysphagia diet  - Monitor intake    Ischemic cardiomyopathy  Assessment & Plan  - No acute changes  - Continue home entresto and plavix    CVA (cerebral vascular accident) ([facility] )  Assessment & Plan  - History of CVA with residual aphasia  - Limited historian, difficulty with  service      Encephalopathy  Assessment & Plan  Continue home perphenazine 4mg TID    HTN (hypertension)  Assessment & Plan  History of essential hypertension, on metoprolol and midodrine   - Hold home metoprolol 25, midodrine 5mg in the setting of hypotension  - Monitor BP, can restart as needed    HLD (hyperlipidemia)  Assessment & Plan  - Continue home lipitor 10mg  - Continue home cholestyramine    GERD (gastroesophageal reflux disease)  Assessment & Plan  Continue protonix 40mg daily    Depression  Assessment & Plan  Continue home sertraline     Type 2 diabetes mellitus ([facility] )  Assessment & Plan  Lab Results   Component Value Date    HGBA1C 10 3 (H) 12/06/2021       Recent Labs     01/17/22  1253   POCGLU 173*       Blood Sugar Average: Last 72 hrs:  (P) 173     - Continue home Lantus 5 U QHS  - Monitor blood glucose, SSI as needed    VTE Pharmacologic Prophylaxis: VTE Score: 5 High Risk (Score >/= 5) - Pharmacological DVT Prophylaxis Ordered: heparin  Sequential Compression Devices Ordered  Code Status: Level 1 - Full Code per previous documentation from nursing home  Discussion with family: Updated  (daughter in law) via phone  Anticipated Length of Stay: Patient will be admitted on an inpatient basis with an anticipated length of stay of greater than 2 midnights secondary to hypotension, UTI  Chief Complaint:  Hypotension    History of Present Illness:  Josy Rand is a 73 y o  female with a PMH of ESRD on hemodialysis (Tue, Thu, Sat) , hypertension, CVA with residual aphasia, ischemic cardiomyopathy with EF 35% who presents with hypotension    Patient was on route to doctor's appointment when she was noted to be hypotensive with systolic pressures in the 80s  Patient was taken to the emergency room  On workup, was noted to have UA positive for bacteria, leukocytes, and RBCs  CT abdomen and pelvis demonstrating air and bladder and kidney  Troponin elevated on admission as was BNP >15,000  Creatinine and renal function at baseline  On examination, patient is Kittitian-speaking and is a limited historian  Utilized  service, however patient had difficulty hearing and speaking loud enough for  to hear  Patient gives conflicting answers at times  Patient states she was brought to the hospital when she was going to doctor's office, believe she is here because her feet hurt  Endorsing 10 day history of diarrhea, states she has 3-5 episodes per day with no visible blood or black tarry stool, denies abdominal pain  Patient endorsing urinary complaints including dysuria, burning, frequency, however giving conflicting answers at times regarding urinary complaints  Endorsing bilateral generalized foot pain, without focal tenderness, and denies any trauma or accidents to the area  Denying chest pain, respiratory complaints, shortness of breath, headache, myalgias, arthralgias, abdominal tenderness, nausea, vomiting  States she is physically active in the nursing home, however states she ambulates with wheelchair, and with assistance  Patient oriented to self and is aware she is in hospital, but is unable to say city, state, date  Spoke to patient's daughter in-law who states patient has been living in a nursing home since her last hospitalization 1 month ago when she was in the ICU, diagnosed with renal disease and started on dialysis  She states the nursing home has been managing her medications and health, and is not familiar with current medication changes or current medical history  States she visited the patient 2 days ago and noted the patient having diarrhea at that time, but has not heard other complaints    States patient has poor feeling in her lower extremities, and is unaware of foot pain  Review of Systems:  Review of Systems   Constitutional: Negative for chills and fever  HENT: Negative for congestion, sinus pressure and sore throat  Eyes: Negative for visual disturbance  Respiratory: Negative for cough, chest tightness and shortness of breath  Cardiovascular: Negative for chest pain and leg swelling  Gastrointestinal: Positive for diarrhea  Negative for abdominal distention, abdominal pain, blood in stool, nausea, rectal pain and vomiting  Genitourinary: Positive for dysuria and frequency  Negative for difficulty urinating and hematuria  Musculoskeletal: Negative for arthralgias and myalgias  Endorsing bilateral foot pain, non localized   Skin: Negative for color change  Neurological: Negative for dizziness, light-headedness and headaches  Psychiatric/Behavioral: Negative for agitation, behavioral problems and sleep disturbance  All other systems reviewed and are negative        Past Medical and Surgical History:   Past Medical History:   Diagnosis Date    Anemia due to chronic kidney disease     Atherosclerotic heart disease of native coronary artery without angina pectoris     Cardiac pacemaker     Dependence on renal dialysis ([facility] )     Dysphagia     [facility] stage renal disease ([facility])     GERD (gastroesophageal reflux disease)     Hyperlipidemia     Hypertensive chronic kidney disease with stage 5 chronic kidney disease or end stage renal disease ([facility])     Hypotension 12/8/2021    Major depressive disorder, recurrent ([facility])     MRSA (methicillin resistant Staphylococcus aureus)     Pressure ulcer of sacral region, [facility]     Psychiatric disorder     anxiety    Type 2 diabetes mellitus with chronic kidney disease ([facility] )     Type 2 diabetes mellitus with diabetic nephropathy ([facility])     Type 2 diabetes mellitus with diabetic peripheral angiopathy without gangrene ([facility] )     Type 2 diabetes mellitus with hyperglycemia ([facility] )     Type 2 diabetes mellitus with hypoglycemia ([facility])     Unspecified Escherichia coli (E  coli) as the cause of diseases classified elsewhere        Past Surgical History:   Procedure Laterality Date    IR TUNNELED CENTRAL LINE CHECK/CHANGE/REPOSITION  1/3/2022    IR TUNNELED DIALYSIS CATHETER PLACEMENT  12/10/2021       Meds/Allergies:  Prior to Admission medications    Medication Sig Start Date End Date Taking?  Authorizing Provider   Acetaminophen 325 MG CAPS Take 650 mg by mouth every 6 (six) hours as needed   Yes Historical Provider, MD   aspirin (ECOTRIN LOW STRENGTH) 81 mg EC tablet Take 81 mg by mouth daily   Yes Historical Provider, MD   atorvastatin (LIPITOR) 10 mg tablet Take 1 tablet (10 mg total) by mouth daily 12/23/21 1/22/22 Yes Kacey Lee MD   B Complex-C-Folic Acid (NEPHRO VITAMINS PO) Take 1 tablet by mouth daily   Yes Historical Provider, MD   cholecalciferol (VITAMIN D3) 1,000 units tablet Take 2,000 Units by mouth daily   Yes Historical Provider, MD   cholestyramine sugar free (QUESTRAN LIGHT) 4 g packet Take 1 packet (4 g total) by mouth 2 (two) times a day 12/22/21 1/21/22 Yes Kacey Lee MD   clopidogrel (PLAVIX) 75 mg tablet Take 75 mg by mouth daily   Yes Historical Provider, MD   docusate sodium (COLACE) 100 mg capsule Take 100 mg by mouth 2 (two) times a day   Yes Historical Provider, MD   esomeprazole (NexIUM) 40 MG capsule Take 40 mg by mouth every morning before breakfast   Yes Historical Provider, MD   gentamicin (GARAMYCIN) 0 1 % cream Apply 1 application topically daily 12/23/21  Yes Kacey Lee MD   glucagon (GLUCAGEN) 1 mg injection Inject 1 mg into a muscle once As needed for low blood sugar   Yes Historical Provider, MD   insulin glargine (LANTUS) 100 units/mL subcutaneous injection Inject 5 Units under the skin daily at bedtime   Yes Historical Provider, MD   insulin lispro (HumaLOG) 100 units/mL injection Inject under the skin Sliding scale coverage  Yes Historical Provider, MD   latanoprost (XALATAN) 0 005 % ophthalmic solution Administer 1 drop to both eyes daily at bedtime   Yes Historical Provider, MD   metoprolol tartrate (LOPRESSOR) 25 mg tablet Take 25 mg by mouth daily   Yes Historical Provider, MD   midodrine (PROAMATINE) 5 mg tablet Take 5 mg by mouth 3 (three) times a week 1 tablet Daily on Tue,Thus,Sat prior to HD   Yes Historical Provider, MD   Nutritional Supplements (Alon) POWD Take by mouth   Yes Historical Provider, MD   ondansetron (ZOFRAN) 4 mg tablet Take 4 mg by mouth every 6 (six) hours as needed for nausea or vomiting   Yes Historical Provider, MD   perphenazine 4 mg tablet Take 4 mg by mouth 3 (three) times a day With meals   Yes Historical Provider, MD   sacubitril-valsartan (ENTRESTO) 24-26 MG TABS Take 1 tablet by mouth 2 (two) times a day Hold morning of dialysis days 12/22/21 1/21/22 Yes [facility], MD   sertraline (ZOLOFT) 25 mg tablet Take 25 mg by mouth daily   Yes Historical Provider, MD   Sevelamer Carbonate 0 8 g PACK Take 1 Package by mouth 3 (three) times a day 1 Packet with meals TID   Yes Historical Provider, MD   calcitriol (ROCALTROL) 0 25 mcg capsule Take 0 25 mcg by mouth daily  Patient not taking: Reported on 1/17/2022     Historical Provider, MD   Epoetin Colby-epbx (Retacrit) 4000 units/ml Inject 1 mL under the skin 3 (three) times a week Hold if HGB greater than or equal to 10  Patient not taking: Reported on 1/17/2022     Historical Provider, MD   nystatin (MYCOSTATIN) cream Apply 1 application topically daily To buttocks  Patient not taking: Reported on 1/17/2022     Historical Provider, MD     I have reveiwed home medications using records provided by [facility]  Allergies: Allergies   Allergen Reactions    Penicillins Other (See Comments)     Unknown         Social History:  Marital Status:     Occupation:  Not employed  Patient Pre-hospital Living Situation: Skilled Nursing Facility:    Patient Pre-hospital Level of Mobility: Wheelchair  Patient Pre-hospital Diet Restrictions:  Carbohydrate controlled, mincedand moist  Substance Use History:   Social History     Substance and Sexual Activity   Alcohol Use None     Social History     Tobacco Use   Smoking Status Unknown If Ever Smoked   Smokeless Tobacco Never Used     Social History     Substance and Sexual Activity   Drug Use Not on file       Family History:  History reviewed  No pertinent family history  Physical Exam:     Vitals:   Blood Pressure: 117/64 (01/17/22 1846)  Pulse: (!) 114 (01/17/22 1846)  Temperature: 99 6 °F (37 6 °C) (01/17/22 1139)  Temp Source: Rectal (01/17/22 1139)  Respirations: 16 (01/17/22 1846)  Weight - Scale: 50 7 kg (111 lb 12 4 oz) (01/17/22 1135)  SpO2: 100 % (01/17/22 1846)    Physical Exam  Vitals and nursing note reviewed  Constitutional:       General: She is not in acute distress  Appearance: She is not ill-appearing, toxic-appearing or diaphoretic  HENT:      Head: Normocephalic and atraumatic  Mouth/Throat:      Mouth: Mucous membranes are moist    Eyes:      Conjunctiva/sclera: Conjunctivae normal    Cardiovascular:      Rate and Rhythm: Normal rate and regular rhythm  Pulses: Normal pulses  Heart sounds: Normal heart sounds  No murmur heard  No gallop  Pulmonary:      Effort: Pulmonary effort is normal  No respiratory distress  Breath sounds: Normal breath sounds  No wheezing or rales  Abdominal:      General: There is no distension  Palpations: Abdomen is soft  There is no mass  Tenderness: There is no abdominal tenderness  There is no guarding  Comments: Hyperactive bowel sounds   Genitourinary:     Comments: No suprapubic tenderness  No CVA tenderness  Musculoskeletal:         General: No swelling or tenderness  Right lower leg: No edema  Left lower leg: No edema  Feet:    Skin:     General: Skin is warm and dry  Neurological:      General: No focal deficit present  Mental Status: She is alert  Comments: Oriented to self, hospital, unable to state city, state, date          Additional Data:     Lab Results:  Results from last 7 days   Lab Units 01/17/22  1227   WBC Thousand/uL 6 77   HEMOGLOBIN g/dL 9 6*   HEMATOCRIT % 30 5*   PLATELETS Thousands/uL 257   NEUTROS PCT % 51   LYMPHS PCT % 43   MONOS PCT % 6   EOS PCT % 0     Results from last 7 days   Lab Units 01/17/22  1227   SODIUM mmol/L 140   POTASSIUM mmol/L 3 3*   CHLORIDE mmol/L 107   CO2 mmol/L 22   BUN mg/dL 58*   CREATININE mg/dL 2 75*   ANION GAP mmol/L 11   CALCIUM mg/dL 9 6   ALBUMIN g/dL 2 1*   TOTAL BILIRUBIN mg/dL 0 26   ALK PHOS U/L 128*   ALT U/L 35   AST U/L 38   GLUCOSE RANDOM mg/dL 152*         Results from last 7 days   Lab Units 01/17/22  1253   POC GLUCOSE mg/dl 173*         Results from last 7 days   Lab Units 01/17/22  1227   LACTIC ACID mmol/L 1 9       Imaging: Reviewed radiology reports from this admission including: chest xray and abdominal/pelvic CT  CT abdomen pelvis with contrast   Final Result by Latanya Mosqueda MD (01/17 1442)            Air in the bladder could be due to infection or recent instrumentation  Recommend clinical correlation and further evaluation with urinalysis  Droplet of air in the right kidney likely related to air in the bladder  Diverticulosis without diverticulitis  The study was marked in [facility] for notification  Workstation performed: [id number]         XR chest 1 view portable   Final Result by Perla Arevalo MD (01/17 0752)      No acute cardiopulmonary disease  Workstation performed: [id number]             EKG and Other Studies Reviewed on Admission:   · EKG: Reviewed, per ED documentation  ** Please Note: This note has been constructed using a voice recognition system   **

## 2022-01-18 NOTE — ASSESSMENT & PLAN NOTE
Lab Results   Component Value Date    HGBA1C 10 3 (H) 12/06/2021       Recent Labs     01/17/22  1253   POCGLU 173*       Blood Sugar Average: Last 72 hrs:  (P) 173     - Continue home Lantus 5 U QHS  - Monitor blood glucose, SSI as needed

## 2022-01-18 NOTE — ASSESSMENT & PLAN NOTE
Patient reports 10 day history of diarrhea, reports 3-5 episodes per day    Denies blood in stool, black tarry stool     - stool enteric panel and C diff sent and pending

## 2022-01-18 NOTE — ASSESSMENT & PLAN NOTE
History of essential hypertension, on metoprolol and midodrine   - Hold home metoprolol 25, midodrine 5mg in the setting of hypotension  - Monitor BP, can restart as needed

## 2022-01-18 NOTE — ASSESSMENT & PLAN NOTE
Patient found to have UA with innumerable bacteria and WBC, and 10-20 RBC   CT abdomen showing air in bladder and kidneys  Patient endorsing dysuria, frequency  Lactic acid 1 9    - Urinary retention protocol, bladder scan as needed  - Monitor VS, CBC  - Follow up blood cultures - negative to date  - Urine cultures growing klebsiella, susceptible to current antibiotics  - Ceftriaxone 1g daily, (to be given after dialysis when applicable) Day #3

## 2022-01-18 NOTE — H&P
[facility] Attending Physician Attestation Note - Admission    I have seen and examined [facility] [place] personally and have reviewed the medical record independently  I have reviewed the case with the resident physician including all assessments and the plan of care for each  I agree with the resident physician and offer the following addendum to the below statements by the resident physician:     Date Evaluated:  1/17/2022  Time Evaluated:  7:00 p m  Subjective / HPI:   72-year-old female who is primarily Azeri speaking coming in from facility because of hypotension that was noted at the facility  Patient very poor historian  Unable to provide good history despite using the language line  Majority of the history obtained from patient's daughter by the resident as below  Apparently patient has been having diarrhea for last few days  Also complains of some nonspecific urinary symptoms  Exam:   Constitutional: Pt appears comfortable  Not in any acute distress  Cardiovascular: Normal rate, regular rhythm, normal heart sounds  No murmur heard  Pulmonary/Chest: Effort normal, air entry b/l equal  No respiratory distress  Pt has no wheezes or crackles  Abdominal: Soft  Non-distended, Non-tender  Bowel sounds are normal    Neurological: awake, alert  Assessment and Plan:  · UTI - start ceftriaxone - follow-up urine cultures  · Diarrhea - follow-up stool studies  · ESRD - TTS - consult nephro  · Hypotension - imrpoved after getting 500 cc RL in ER - cont to hold antihypertensives for now  Time Spent for Care: 45 minutes  More than 50% of total time spent on counseling and coordination of care as described above  Current Patient Status: Inpatient   Anticipated Length of Stay:  Patient will be admitted on an Inpatient basis with an anticipated length of stay of  > 2 midnights     Justification for Hospital Stay: above    For detailed history, assessment, and plan of care, please review the statements below by resident       Jocelynn Cardenas MD

## 2022-01-18 NOTE — CONSULTS
[facility] [place]ivas 73 y o  female MRN: [id number]  Unit/Bed#: ED 08 Encounter: [id number]    ASSESSMENT and PLAN:  1  ESRD on HD ([facility]):   · Previously on PD  Recently transition to hemodialysis  · Target weight 50 5 kg  · Outpatient prescription:  Qt 180 minutes, Qb 350 mL/minute, Qd 600 mL/minute, 3K, 30 bicarb, 138 sodium  · Last outpatient treatment 01/15/2022  Post treatment weight 49 4 kg  · Patient due for hemodialysis treatment today  Staffing issues therefore will plan on next treatment 01/20/2022  Patient seen and examined  No biochemical or clinical indication for treatment today  · Next hemodialysis treatment planned for 01/20/2022 due to logistics  2  Access:    · Left IJ PermCath inserted 1/16/2021  · Per nursing facility no dated 01/14  General surgery consult was placed  PD catheter removal to be scheduled  3  Blood pressure/volume status:  · No evidence of volume overload  Left last treatment slightly below target weight  · Blood pressure in the 90s on admission with mild tachycardia  · Outpatient blood pressure readings at dialysis reviewed:  Blood pressure generally acceptable predialysis usually near 263-547 systolic  · On midodrine 5 mg predialysis  · Hold Entresto prior to dialysis  4  Anemia:   · Recent CVA, not on TAJ  5  Mineral and bone disease:   · Continue vitamin D 3 2000 units daily  · Last outpatient phosphorus level 1 7 on 01/13  At this time will hold phosphorus restriction and hold binder and monitor  · Last outpatient   6  UTI:  · Urinalysis:  1+ protein, 10-20 RBCs, innumerable WBCs, innumerable bacteria  Large blood  · CT of the abdomen and pelvis with IV contrast:  No perinephric stranding noted  No hydronephrosis  There is air noted in the urinary bladder  · On ceftriaxone  7  Cardiomyopathy:    · Ejection fraction 35-40%:    · Recently started on Entresto     8  Diabetes mellitus:  Management per primary team  9  Failure to thrive:  Patient has had a significant functional decline since last fall  She is now living in a nursing facility  Extremely debilitated, cachectic, muscle wasting noted  SUMMARY OF RECOMMENDATIONS:   Hemodialysis planned for 1/20    HISTORY OF PRESENT ILLNESS:  Requesting Physician: Miri Capps MD  Reason for Consult: ESRD on HD    Omar Brian is a 73 y o  female medical history of ESRD recently transitioned from PD to hemodialysis currently on a TTS schedule, hypertension, diabetes mellitus, hyperlipidemia, depression, GERD, CVA, cardiomyopathy who was admitted to [facility] after presenting with hypotension/diarrhea/urinary complaints  The patient was interviewed via  her chief complaint was foot pain  Urine found to have innumerable WBCs, innumerable bacteria  He was seen and examined in the emergency room  Communicated through phone   She told me her feet hurt  She had no other complaints  Significant muscle wasting  Patient wearing a diaper  A renal consultation is requested today for assistance in the management of ESRD  Omar Brian is a known ESRD patient who undergoes maintenance hemodialysis at [facility] on TTS        PAST MEDICAL HISTORY:  Past Medical History:   Diagnosis Date    Anemia due to chronic kidney disease     Atherosclerotic heart disease of native coronary artery without angina pectoris     Cardiac pacemaker     Dependence on renal dialysis ([address] )     Dysphagia     End stage renal disease (HCC)     GERD (gastroesophageal reflux disease)     Hyperlipidemia     Hypertensive chronic kidney disease with stage 5 chronic kidney disease or end stage renal disease (HCC)     Hypotension 12/8/2021    Major depressive disorder, recurrent (HCC)     MRSA (methicillin resistant Staphylococcus aureus)     Pressure ulcer of sacral region, unstageable ([address] )     Psychiatric disorder     anxiety    Type 2 diabetes mellitus with chronic kidney disease ([facility] )     Type 2 diabetes mellitus with diabetic nephropathy (HCC)     Type 2 diabetes mellitus with diabetic peripheral angiopathy without gangrene ([facility] )     Type 2 diabetes mellitus with hyperglycemia (HCC)     Type 2 diabetes mellitus with hypoglycemia (HCC)     Unspecified Escherichia coli (E  coli) as the cause of diseases classified elsewhere        PAST SURGICAL HISTORY:  Past Surgical History:   Procedure Laterality Date    IR TUNNELED CENTRAL LINE CHECK/CHANGE/REPOSITION  1/3/2022    IR TUNNELED DIALYSIS CATHETER PLACEMENT  12/10/2021       ALLERGIES:  Allergies   Allergen Reactions    Penicillins Other (See Comments)     Unknown         SOCIAL HISTORY:  Social History     Substance and Sexual Activity   Alcohol Use None     Social History     Substance and Sexual Activity   Drug Use Not on file     Social History     Tobacco Use   Smoking Status Unknown If Ever Smoked   Smokeless Tobacco Never Used       FAMILY HISTORY:  History reviewed  No pertinent family history      MEDICATIONS:    Current Facility-Administered Medications:     aspirin (ECOTRIN LOW STRENGTH) EC tablet 81 mg, 81 mg, Oral, Daily, Jerrica Bautista MD    atorvastatin (LIPITOR) tablet 10 mg, 10 mg, Oral, Daily, Jerrica Bautista MD    b complex-vitamin C-folic acid (NEPHROCAPS) capsule 1 capsule, 1 capsule, Oral, Daily With Jeanelle Lefort, MD    ceftriaxone (ROCEPHIN) 1 g/50 mL in dextrose IVPB, 1,000 mg, Intravenous, Q24H, Jerrica Bautista MD, Stopped at 01/17/22 2200    cholestyramine sugar free (QUESTRAN LIGHT) packet 4 g, 4 g, Oral, BID, Jerrica Bautista MD, 4 g at 01/17/22 2100    clopidogrel (PLAVIX) tablet 75 mg, 75 mg, Oral, Daily, Jerrica Bautista MD    docusate sodium (COLACE) capsule 100 mg, 100 mg, Oral, BID, Jerrica Bautista MD, 100 mg at 01/17/22 2100    heparin (porcine) subcutaneous injection 5,000 Units, 5,000 Units, Subcutaneous, Q8H [address], 5,000 Units at 01/18/22 0630 **AND** [CANCELED] Platelet count, , , AM Draw, Robbie Sandy MD    insulin glargine (LANTUS) subcutaneous injection 5 Units 0 05 mL, 5 Units, Subcutaneous, HS, Robbie Sandy MD, 5 Units at 01/17/22 2100    latanoprost (XALATAN) 0 005 % ophthalmic solution 1 drop, 1 drop, Both Eyes, HS, Robbie Sandy MD, 1 drop at 01/17/22 2111    pantoprazole (PROTONIX) EC tablet 40 mg, 40 mg, Oral, Early Morning, Robbie Sandy MD, 40 mg at 01/18/22 0630    perphenazine tablet 4 mg, 4 mg, Oral, TID, Robbie Sandy MD, 4 mg at 01/17/22 2100    sacubitril-valsartan (ENTRESTO) 24-26 MG per tablet 1 tablet, 1 tablet, Oral, BID, Robbie Sandy MD, 1 tablet at 01/17/22 2100    sertraline (ZOLOFT) tablet 25 mg, 25 mg, Oral, Daily, Robbie Sandy MD    sevelamer (RENAGEL) tablet 800 mg, 800 mg, Oral, TID With Meals, Robbie Sandy MD    Current Outpatient Medications:     Acetaminophen 325 MG CAPS, Take 650 mg by mouth every 6 (six) hours as needed, Disp: , Rfl:     aspirin (ECOTRIN LOW STRENGTH) 81 mg EC tablet, Take 81 mg by mouth daily, Disp: , Rfl:     atorvastatin (LIPITOR) 10 mg tablet, Take 1 tablet (10 mg total) by mouth daily, Disp: 30 tablet, Rfl: 0    B Complex-C-Folic Acid (NEPHRO VITAMINS PO), Take 1 tablet by mouth daily, Disp: , Rfl:     cholecalciferol (VITAMIN D3) 1,000 units tablet, Take 2,000 Units by mouth daily, Disp: , Rfl:     cholestyramine sugar free (QUESTRAN LIGHT) 4 g packet, Take 1 packet (4 g total) by mouth 2 (two) times a day, Disp: 60 packet, Rfl: 0    clopidogrel (PLAVIX) 75 mg tablet, Take 75 mg by mouth daily, Disp: , Rfl:     docusate sodium (COLACE) 100 mg capsule, Take 100 mg by mouth 2 (two) times a day, Disp: , Rfl:     esomeprazole (NexIUM) 40 MG capsule, Take 40 mg by mouth every morning before breakfast, Disp: , Rfl:     gentamicin (GARAMYCIN) 0 1 % cream, Apply 1 application topically daily, Disp: 15 g, Rfl: 0    glucagon (GLUCAGEN) 1 mg injection, Inject 1 mg into a muscle once As needed for low blood sugar, Disp: , Rfl:     insulin glargine (LANTUS) 100 units/mL subcutaneous injection, Inject 5 Units under the skin daily at bedtime, Disp: , Rfl:     insulin lispro (HumaLOG) 100 units/mL injection, Inject under the skin Sliding scale coverage  , Disp: , Rfl:     latanoprost (XALATAN) 0 005 % ophthalmic solution, Administer 1 drop to both eyes daily at bedtime, Disp: , Rfl:     metoprolol tartrate (LOPRESSOR) 25 mg tablet, Take 25 mg by mouth daily, Disp: , Rfl:     midodrine (PROAMATINE) 5 mg tablet, Take 5 mg by mouth 3 (three) times a week 1 tablet Daily on Tue,Thus,Sat prior to HD, Disp: , Rfl:     Nutritional Supplements (Alon) POWD, Take by mouth, Disp: , Rfl:     ondansetron (ZOFRAN) 4 mg tablet, Take 4 mg by mouth every 6 (six) hours as needed for nausea or vomiting, Disp: , Rfl:     perphenazine 4 mg tablet, Take 4 mg by mouth 3 (three) times a day With meals, Disp: , Rfl:     sacubitril-valsartan (ENTRESTO) 24-26 MG TABS, Take 1 tablet by mouth 2 (two) times a day Hold morning of dialysis days, Disp: 60 tablet, Rfl: 0    sertraline (ZOLOFT) 25 mg tablet, Take 25 mg by mouth daily, Disp: , Rfl:     Sevelamer Carbonate 0 8 g PACK, Take 1 Package by mouth 3 (three) times a day 1 Packet with meals TID, Disp: , Rfl:     calcitriol (ROCALTROL) 0 25 mcg capsule, Take 0 25 mcg by mouth daily (Patient not taking: Reported on 1/17/2022 ), Disp: , Rfl:     Epoetin Colby-epbx (Retacrit) 4000 units/ml, Inject 1 mL under the skin 3 (three) times a week Hold if HGB greater than or equal to 10 (Patient not taking: Reported on 1/17/2022 ), Disp: , Rfl:     nystatin (MYCOSTATIN) cream, Apply 1 application topically daily To buttocks (Patient not taking: Reported on 1/17/2022 ), Disp: , Rfl:     REVIEW OF SYSTEMS:  Constitutional:  Positive for fatigue   HENT:  No congestion reported  Eyes: Negative for visual disturbance  Respiratory:  No shortness of breath reported   Cardiovascular:  Denies chest pain   Gastrointestinal:  Having diarrhea  Genitourinary:  Vague urinary complaints  Musculoskeletal:  Complains of foot pain   Skin: Negative for rash  Neurological:  No headache  Hematological: Negative for  bleeding  Psychiatric/Behavioral:  Positive for confusion  All the systems were reviewed and were negative except as documented on the HPI  PHYSICAL EXAM:  Current Weight: Weight - Scale: 50 7 kg (111 lb 12 4 oz)  First Weight: Weight - Scale: 50 7 kg (111 lb 12 4 oz)  Vitals:    01/17/22 2100 01/17/22 2200 01/18/22 0000 01/18/22 0630   BP: 135/65 117/65 107/58 112/57   BP Location: Right arm Right arm Right arm    Pulse: (!) 116 (!) 118 (!) 114 102   Resp: 16 16 16    Temp:       TempSrc:       SpO2: 100% 100% 100% 100%   Weight:           Intake/Output Summary (Last 24 hours) at 1/18/2022 0752  Last data filed at 1/17/2022 2200  Gross per 24 hour   Intake 550 ml   Output 75 ml   Net 475 ml     Physical Exam  Constitutional:       General: She is not in acute distress  Appearance: She is well-developed  She is cachectic  She is ill-appearing  She is not diaphoretic  HENT:      Head: Normocephalic and atraumatic  Nose: No congestion  Eyes:      General: No scleral icterus  Right eye: No discharge  Left eye: No discharge  Extraocular Movements: Extraocular movements intact  Conjunctiva/sclera: Conjunctivae normal    Neck:      Thyroid: No thyromegaly  Vascular: No carotid bruit or JVD  Trachea: No tracheal deviation  Cardiovascular:      Rate and Rhythm: Normal rate and regular rhythm  Heart sounds: No murmur heard  No friction rub  No gallop  Pulmonary:      Effort: Pulmonary effort is normal  No respiratory distress  Breath sounds: Normal breath sounds  No stridor  No wheezing, rhonchi or rales     Abdominal:      General: Bowel sounds are normal  There is no distension  Palpations: Abdomen is soft  There is no mass  Tenderness: There is no abdominal tenderness  There is no guarding or rebound  Musculoskeletal:         General: Tenderness present  No signs of injury  Normal range of motion  Cervical back: Normal range of motion and neck supple  No rigidity or tenderness  Right lower leg: No edema  Left lower leg: No edema  Skin:     General: Skin is warm and dry  Coloration: Skin is pale  Skin is not jaundiced  Findings: No erythema or rash  Neurological:      General: No focal deficit present  Mental Status: She is alert  Psychiatric:         Attention and Perception: Attention normal          Mood and Affect: Mood is anxious  Speech: Speech normal          Cognition and Memory: Cognition is impaired           Invasive Devices:      Lab Results:   Results from last 7 days   Lab Units 01/18/22  0635 01/17/22  1227   WBC Thousand/uL 6 32 6 77   HEMOGLOBIN g/dL 9 4* 9 6*   HEMATOCRIT % 28 5* 30 5*   PLATELETS Thousands/uL 242 257   POTASSIUM mmol/L  --  3 3*   CHLORIDE mmol/L  --  107   CO2 mmol/L  --  22   BUN mg/dL  --  58*   CREATININE mg/dL  --  2 75*   CALCIUM mg/dL  --  9 6   ALK PHOS U/L  --  128*   ALT U/L  --  35   AST U/L  --  38     Lab Results   Component Value Date    CALCIUM 9 6 01/17/2022    PHOS 3 5 12/18/2021     Other Studies:

## 2022-01-18 NOTE — ASSESSMENT & PLAN NOTE
Lab Results   Component Value Date    HGBA1C 10 3 (H) 12/06/2021       Recent Labs     01/17/22  1253 01/18/22  0823 01/18/22  1132   POCGLU 173* 81 89       Blood Sugar Average: Last 72 hrs:  (P) 841 [id number]     - Continue home Lantus 5 U QHS  - Monitor blood glucose, SSI as needed

## 2022-01-18 NOTE — ASSESSMENT & PLAN NOTE
- Patient on "minced and moist" diet at nursing home   - Started on carbohydrate controlled dysphagia diet  - Monitor intake

## 2022-01-18 NOTE — ASSESSMENT & PLAN NOTE
Patient found to have UA with innumerable bacteria and WBC, and 10-20 RBC   CT abdomen showing air in bladder and kidneys  Patient endorsing dysuria, frequency  Lactic acid 1 9    - Ceftriaxone 1g daily, to be given after dialysis on Tues, Thurs, Sat   Day #2  - Urinary retention protocol, bladder scan as needed  - Monitor VS, CBC  - Follow up blood cultures

## 2022-01-18 NOTE — ASSESSMENT & PLAN NOTE
- Patient found to be hypotensive with SBP in 80s, brought to hospital by EMS  - Has improved to low normal range  - Continue to monitor BP  - Hold home metoprolol and midodrine

## 2022-01-18 NOTE — ASSESSMENT & PLAN NOTE
Patient reports 10 day history of diarrhea, reports 3-5 episodes per day    Denies blood in stool or black tarry stool     - C diff negative  - Stool enteric panel pending

## 2022-01-18 NOTE — ASSESSMENT & PLAN NOTE
Lab Results   Component Value Date    HGBA1C 10 3 (H) 12/06/2021       Recent Labs     01/17/22  1253 01/18/22  0823   POCGLU 173* 81       Blood Sugar Average: Last 72 hrs:  (P) 127     - Continue home Lantus 5 U QHS  - Monitor blood glucose, SSI as needed

## 2022-01-19 PROBLEM — E11.40 NEUROPATHY IN DIABETES (HCC): Status: ACTIVE | Noted: 2022-01-01

## 2022-01-19 PROBLEM — G57.93 NEUROPATHY OF BOTH FEET: Status: ACTIVE | Noted: 2022-01-01

## 2022-01-19 PROBLEM — E83.52 HYPERCALCEMIA: Status: ACTIVE | Noted: 2022-01-01

## 2022-01-19 NOTE — PROGRESS NOTES
[facility]  Progress Note Coit Meckel 1950, 73 y o  female MRN: [id number]  Unit/Bed#: S -01 Encounter: [id number]  Primary Care Provider: Aj Ricks MD   Date and time admitted to hospital: 1/17/2022 11:32 AM    * UTI (urinary tract infection)  Assessment & Plan  Patient found to have UA with innumerable bacteria and WBC, and 10-20 RBC   CT abdomen showing air in bladder and kidneys  Patient endorsing dysuria, frequency  Lactic acid 1 9    - Urinary retention protocol, bladder scan as needed  - Monitor VS, CBC  - Follow up blood cultures - negative to date  - Urine cultures growing klebsiella, susceptible to current antibiotics  - Ceftriaxone 1g daily, (to be given after dialysis when applicable) Day #3      Hypotension  Assessment & Plan  - Patient found to be hypotensive with SBP in 80s, brought to hospital by EMS    - Has improved to low normal range  - Continue to monitor BP  - Restart home metoprolol 25mg      ESRD (end stage renal disease) on dialysis [facility])  Assessment & Plan  Lab Results   Component Value Date    EGFR 15 01/18/2022    EGFR 16 01/17/2022    EGFR 16 12/20/2021    CREATININE 2 94 (H) 01/18/2022    CREATININE 2 75 (H) 01/17/2022    CREATININE 2 87 (H) 12/20/2021     - Renal function currently at baseline  - Elevated Troponin and BNP likely due to ESRD  - Continue home nephrocaps, sevelamer  - Continue hemodialysis Tues, Thurs, Saturday, or as indicated  - Continue midodrine 5mg pre-dialysis  - Nephrology following    Diarrhea  Assessment & Plan  Patient reports 10 day history of diarrhea, reports 3-5 episodes per day    Denies blood in stool or black tarry stool     - C diff negative  - Stool enteric panel negative      Hypercalcemia  Assessment & Plan  Elevated corrected calcium in setting of poor oral intake    Recent Labs     01/17/22  1227 01/17/22  1227 01/18/22  0635 01/18/22  0635 01/19/22  0815   CALCIUM 9 6  --  9 5  --  9 9   ALB 2 1*   < > 2 0*   < > 2 0*   CORRECTEDCA 11 1*   < > 11 1*   < > 11 5*    < > = values in this interval not displayed  -  Ionized calcium level  - Isolyte 50cc/hr x 10 hours  - nephrology following      Neuropathy of both feet  Assessment & Plan  POA, patient complaining of bilateral foot pain, non localized, nontender to palpation, bilateral feet stopping at ankles  Per daughter in law, patient has poor sensation of both feet      - started gabapentin 100mg QHS on dialysis days, can titrate up as needed as outpatient     Elevated troponin  Assessment & Plan  Initially 277, two hours later 259 to 287  - Likely due to ESRD and active infection  - EKG negative for acute cardiac event  - Monitor for chest pain, cardiac symptoms    Dysphagia  Assessment & Plan  - Patient on "minced and moist" diet at nursing home   - Started on carbohydrate controlled dysphagia diet  - Monitor intake    Ischemic cardiomyopathy  Assessment & Plan  - No acute changes  - Continue home entresto and plavix    CVA (cerebral vascular accident) ([facility] [address] )  Assessment & Plan  - History of CVA with residual aphasia  - Limited historian, difficulty with  service      Encephalopathy  Assessment & Plan  Continue home perphenazine 4mg TID    HTN (hypertension)  Assessment & Plan  History of essential hypertension, on metoprolol and midodrine   - Midodrine 5mg to be given predialysis  - Monitor BP  -Restart home metoprolol    HLD (hyperlipidemia)  Assessment & Plan  - Continue home lipitor 10mg  - Continue home cholestyramine    GERD (gastroesophageal reflux disease)  Assessment & Plan  Continue protonix 40mg daily    Depression  Assessment & Plan  Continue home sertraline     Type 2 diabetes mellitus ([facility] )  Assessment & Plan  Lab Results   Component Value Date    HGBA1C 10 3 (H) 12/06/2021       Recent Labs     01/17/22  1253 01/18/22  0823 01/18/22  1132   POCGLU 173* 81 89       Blood Sugar Average: Last 72 hrs:  (P) 564 [id number] - Continue home Lantus 5 U QHS  - Monitor blood glucose, SSI as needed      VTE Pharmacologic Prophylaxis: VTE Score: 5 High Risk (Score >/= 5) - Pharmacological DVT Prophylaxis Ordered: heparin  Sequential Compression Devices Ordered  Patient Centered Rounds: I performed bedside rounds with nursing staff today  Discussions with Specialists or Other Care Team Provider: case management, nursing    Education and Discussions with Family / Patient: Updated  (daughter in law) via phone  Current Length of Stay: 2 day(s)  Current Patient Status: Inpatient   Discharge Plan: Anticipate discharge in 24-48 hrs to prior assisted or independent living facility  Code Status: Level 1 - Full Code    Subjective:   Patient found resting comfortably in bed in no acute distress  Scant in responses, endorsing foot pain and lower back pain, denies chest pain, shortness of breath, dysuria, fevers, chills  Wound care evaluated patient for care of sacral and foot wounds  Hemodialysis planned for tomorrow  Objective:     Vitals:   Temp (24hrs), Av °F (36 7 °C), Min:97 5 °F (36 4 °C), Max:98 6 °F (37 °C)    Temp:  [97 5 °F (36 4 °C)-98 6 °F (37 °C)] 98 °F (36 7 °C)  HR:  [] 116  Resp:  [18-20] 18  BP: (115-135)/(56-70) 135/61  SpO2:  [91 %-100 %] 100 %  Body mass index is 19 19 kg/m²  Input and Output Summary (last 24 hours): Intake/Output Summary (Last 24 hours) at 2022 1349  Last data filed at 2022 0858  Gross per 24 hour   Intake 120 ml   Output --   Net 120 ml       Physical Exam:   Physical Exam  Vitals and nursing note reviewed  Constitutional:       General: She is not in acute distress  Appearance: She is not toxic-appearing or diaphoretic  HENT:      Head: Normocephalic and atraumatic  Mouth/Throat:      Mouth: Mucous membranes are moist    Eyes:      Conjunctiva/sclera: Conjunctivae normal    Cardiovascular:      Rate and Rhythm: Normal rate and regular rhythm  Heart sounds: Normal heart sounds  No murmur heard  Pulmonary:      Effort: Pulmonary effort is normal  No respiratory distress  Abdominal:      General: Bowel sounds are normal  There is no distension  Palpations: Abdomen is soft  There is no mass  Tenderness: There is no abdominal tenderness  There is no guarding  Genitourinary:     Comments: No suprapubic tenderness  Musculoskeletal:         General: No tenderness  Right lower leg: No edema  Left lower leg: No edema  Skin:     General: Skin is warm and dry  Neurological:      General: No focal deficit present  Mental Status: She is alert            Additional Data:     Labs:  Results from last 7 days   Lab Units 01/19/22  0815   WBC Thousand/uL 5 83   HEMOGLOBIN g/dL 9 3*   HEMATOCRIT % 29 2*   PLATELETS Thousands/uL 249   NEUTROS PCT % 61   LYMPHS PCT % 31   MONOS PCT % 7   EOS PCT % 0     Results from last 7 days   Lab Units 01/19/22  0815   SODIUM mmol/L 137   POTASSIUM mmol/L 4 5   CHLORIDE mmol/L 106   CO2 mmol/L 20*   BUN mg/dL 73*   CREATININE mg/dL 3 73*   ANION GAP mmol/L 11   CALCIUM mg/dL 9 9   ALBUMIN g/dL 2 0*   TOTAL BILIRUBIN mg/dL 0 33   ALK PHOS U/L 117*   ALT U/L 30   AST U/L 47*   GLUCOSE RANDOM mg/dL 100         Results from last 7 days   Lab Units 01/19/22  1142 01/19/22  0716 01/18/22  2035 01/18/22  1616 01/18/22  1132 01/18/22  0823 01/17/22  1253   POC GLUCOSE mg/dl 255* 134 132 97 89 81 173*         Results from last 7 days   Lab Units 01/17/22  1227   LACTIC ACID mmol/L 1 9       Lines/Drains:  Invasive Devices  Report    Central Venous Catheter Line            CVC Central Lines 12/10/21 Double 40 days          Peripheral Intravenous Line            Peripheral IV 01/17/22 Left Antecubital 2 days    Peripheral IV 01/17/22 Right Hand 2 days          Hemodialysis Catheter            HD Permanent Double Catheter 16 days                Central Line:  Goal for removal: N/A - Chronic PICC Imaging: reviewed pertinent imaging    Recent Cultures (last 7 days):   Results from last 7 days   Lab Units 01/17/22  1624 01/17/22  1340 01/17/22  1236 01/17/22  1227   BLOOD CULTURE   --   --  No Growth at 24 hrs  No Growth at 24 hrs     URINE CULTURE  >100,000 cfu/ml Klebsiella pneumoniae*  --   --   --    C DIFF TOXIN B BY PCR   --  Negative  --   --        Last 24 Hours Medication List:   Current Facility-Administered Medications   Medication Dose Route Frequency Provider Last Rate    aspirin  81 mg Oral Daily Chelle Abarca MD      atorvastatin  10 mg Oral Daily Chelle Abarca MD      b complex-vitamin C-folic acid  1 capsule Oral Daily With Dinner Chelle Abarca MD      cefTRIAXone  1,000 mg Intravenous Q24H Chelle Abarca MD 1,000 mg (01/1950)    cholestyramine sugar free  4 g Oral BID Chelle Abarca MD      clopidogrel  75 mg Oral Daily Chelle Abarca MD      docusate sodium  100 mg Oral BID Chelle Abarca MD      gabapentin  100 mg Oral Once per day on Tue Thu Sat Chelle Abarca MD      heparin (porcine)  5,000 Units Subcutaneous [facility] Chelle Abarca MD      insulin glargine  5 Units Subcutaneous HS Chelle Abarca MD      insulin lispro  1-5 Units Subcutaneous TID [facility] Chelle Abarca MD      latanoprost  1 drop Both Eyes HS Chelle Abarca MD      metoprolol tartrate  25 mg Oral Daily Chelle Abarca MD      midodrine  5 mg Oral Before Dialysis Chelle Abarca MD      multi-electrolyte  50 mL/hr Intravenous Continuous Maria C Caceres MD 50 mL/hr (01/19/22 1230)    pantoprazole  40 mg Oral Early Morning Chelle Abarca MD      perphenazine  4 mg Oral TID Chelle Abarca MD      sacubitril-valsartan  1 tablet Oral BID SANTINO Alcantara      sertraline  25 mg Oral Daily Chelle Abarca MD          Today, Patient Was Seen By: Chelle Abarca MD    **Please Note: This note may have been constructed using a voice recognition system  **

## 2022-01-19 NOTE — DISCHARGE INSTR - OTHER ORDERS
Skin care Plan:  1-Cleanse sacro-buttocks with soap and water  Apply Xeroform over open wound bed on sacrum and cover with Allevyn foam  Cecil with T for treatment  Change every other day and PRN  Peel back and check skin daily  2-Turn/reposition q2h for pressure re-distribution on skin   3-Elevate heels to offload pressure  4-Moisturize skin daily with skin nourishing cream  5-Ehob cushion in chair when out of bed  6-Hydraguard to bilateral heels BID and PRN  7- Right hip and left medial foot DTI's- Cleanse with soap and water, pat dry  Apply Allevyn foam dressing over wounds  Peel back and check skin daily  Change every 3 days and Prn for dislodgment

## 2022-01-19 NOTE — ASSESSMENT & PLAN NOTE
Elevated corrected calcium in setting of poor oral intake    Recent Labs     01/17/22  1227 01/17/22  1227 01/18/22  0635 01/18/22  0635 01/19/22  0815   CALCIUM 9 6  --  9 5  --  9 9   ALB 2 1*   < > 2 0*   < > 2 0*   CORRECTEDCA 11 1*   < > 11 1*   < > 11 5*    < > = values in this interval not displayed       -  Ionized calcium level  - Isolyte 50cc/hr x 10 hours  - nephrology following

## 2022-01-19 NOTE — PROGRESS NOTES
NEPHROLOGY PROGRESS NOTE   Jessie Lowe 73 y o  female MRN: [id number]  Unit/Bed#: S -01 Encounter: [id number]  Reason for Consult: ESRD      SUMMARY:    43-year-old female with a history of end-stage renal disease who had been on peritoneal dialysis and has recently been transition to hemodialysis Tuesday Thursday Saturday, along with hypertension diabetes who presented for foot pain  She was also found to have an abnormal urinalysis concerning for underlying infection      ASSESSMENT and PLAN:    End-stage renal disease  --had been on peritoneal dialysis now transition to intermittent hemodialysis  --[facility] Tuesday Thursday Saturday  --will plan for next dialysis tomorrow, January 20th  --she still has some residual renal function  --access left IJ PermCath, PD catheter also in place  --will give isolate at 50 cc/hour times 10 hours due to decreased oral intake and elevated calcium level     Anemia of chronic kidney disease  --recent CVA,  not on Epogen     [place] bone disorder-chronic kidney disease  --elevated corrected calcium  --course of 10 hours of intravenous fluid  --not on a phosphate binder  --check an ionized calcium level  --poor oral intake    Low bicarbonate level  --recent diarrhea  --course of isolate    Hypertension  --normotensive and euvolemic      SUBJECTIVE / INTERVAL HISTORY:    No complaints but not very verbal and interactive    OBJECTIVE:  Current Weight: Weight - Scale: 50 7 kg (111 lb 12 4 oz)  Vitals:    01/18/22 1045 01/18/22 1421 01/18/22 2335 01/19/22 0700   BP: 117/63 127/70 115/56 135/61   BP Location: Right arm Right arm Right arm Right arm   Pulse: (!) 111 99 (!) 110 (!) 116   Resp: 16 20 18 18   Temp: 97 7 °F (36 5 °C) 97 5 °F (36 4 °C) 98 6 °F (37 °C) 98 °F (36 7 °C)   TempSrc: Axillary Oral Axillary Axillary   SpO2: 100% 92% 91% 100%   Weight:           Intake/Output Summary (Last 24 hours) at 1/19/2022 1135  Last data filed at 1/19/2022 0858  Gross per 24 hour Intake 120 ml   Output --   Net 120 ml       Review of Systems:    Unable to get a good review of systems    Physical Exam  Vitals and nursing note reviewed  Constitutional:       General: She is not in acute distress  Appearance: Normal appearance  She is normal weight  She is not ill-appearing, toxic-appearing or diaphoretic  HENT:      Head: Normocephalic and atraumatic  Mouth/Throat:      Mouth: Mucous membranes are dry  Eyes:      General: No scleral icterus  Right eye: No discharge  Left eye: No discharge  Cardiovascular:      Rate and Rhythm: Tachycardia present  Pulses: Normal pulses  Heart sounds: No murmur heard  Pulmonary:      Effort: Pulmonary effort is normal  No respiratory distress  Breath sounds: No wheezing  Abdominal:      General: Abdomen is flat  Bowel sounds are normal  There is no distension  Palpations: Abdomen is soft  Tenderness: There is no abdominal tenderness  There is no guarding  Musculoskeletal:      Cervical back: Normal range of motion and neck supple  Right lower leg: No edema  Left lower leg: No edema  Neurological:      General: No focal deficit present  Mental Status: She is alert           Medications:    Current Facility-Administered Medications:     aspirin (ECOTRIN LOW STRENGTH) EC tablet 81 mg, 81 mg, Oral, Daily, Charmayne Andreas, MD, 81 mg at 01/19/22 0942    atorvastatin (LIPITOR) tablet 10 mg, 10 mg, Oral, Daily, Charmayne Andreas, MD, 10 mg at 01/19/22 0943    b complex-vitamin C-folic acid (NEPHROCAPS) capsule 1 capsule, 1 capsule, Oral, Daily With Dinner, Charmayne Andreas, MD, 1 capsule at 01/18/22 1827    ceftriaxone (ROCEPHIN) 1 g/50 mL in dextrose IVPB, 1,000 mg, Intravenous, Q24H, Charmayne Andreas, MD, Last Rate: 100 mL/hr at 01/1950, 1,000 mg at 01/1950    cholestyramine sugar free (QUESTRAN LIGHT) packet 4 g, 4 g, Oral, BID, Charmayne Andreas, MD, 4 g at 01/19/22 0943    clopidogrel (PLAVIX) tablet 75 mg, 75 mg, Oral, Daily, Lucas Rasmussen MD, 75 mg at 01/19/22 0943    docusate sodium (COLACE) capsule 100 mg, 100 mg, Oral, BID, Lucas Rasmussen MD, 100 mg at 01/19/22 0942    heparin (porcine) subcutaneous injection 5,000 Units, 5,000 Units, Subcutaneous, Q8H [address], 5,000 Units at 01/19/22 0532 **AND** [CANCELED] Platelet count, , , AM Draw, Lucas Rasmussen MD    insulin glargine (LANTUS) subcutaneous injection 5 Units 0 05 mL, 5 Units, Subcutaneous, HS, Lucas Rasmussen MD, 5 Units at 01/18/22 2116    insulin lispro (HumaLOG) 100 units/mL subcutaneous injection 1-5 Units, 1-5 Units, Subcutaneous, TID AC **AND** Fingerstick Glucose (POCT), , , TID AC, Lucas Rasmussen MD    latanoprost (XALATAN) 0 005 % ophthalmic solution 1 drop, 1 drop, Both Eyes, HS, Lucas Rasmussen MD, 1 drop at 01/17/22 2111    midodrine (PROAMATINE) tablet 5 mg, 5 mg, Oral, Before Dialysis, Lucas Rasmussen MD    multi-electrolyte (PLASMALYTE-A/ISOLYTE-S PH 7 4) IV solution, 50 mL/hr, Intravenous, Continuous, Jennifer Ordoñez MD    pantoprazole (PROTONIX) EC tablet 40 mg, 40 mg, Oral, Early Morning, Lucas Rasmussen MD, 40 mg at 01/19/22 0532    perphenazine tablet 4 mg, 4 mg, Oral, TID, Lucas Rasmussen MD, 4 mg at 01/19/22 2403    sacubitril-valsartan (ENTRESTO) 24-26 MG per tablet 1 tablet, 1 tablet, Oral, BID, SANTINO Cosby, 1 tablet at 01/18/22 1828    sertraline (ZOLOFT) tablet 25 mg, 25 mg, Oral, Daily, Lucas Rasmussen MD, 25 mg at 01/19/22 1644    Laboratory Results:  Results from last 7 days   Lab Units 01/19/22  0815 01/18/22  0635 01/17/22  1227   WBC Thousand/uL 5 83 6 32 6 77   HEMOGLOBIN g/dL 9 3* 9 4* 9 6*   HEMATOCRIT % 29 2* 28 5* 30 5*   PLATELETS Thousands/uL 249 242 257   POTASSIUM mmol/L 4 5 3 2* 3 3*   CHLORIDE mmol/L 106 106 107   CO2 mmol/L 20* 20* 22   BUN mg/dL 73* 68* 58*   CREATININE mg/dL 3 73* 2 94* 2 75*   CALCIUM mg/dL 9 9 9 5 9 6

## 2022-01-19 NOTE — WOUND OSTOMY CARE
Progress Note - Wound   Adrianna Rivas 73 y o  female MRN: [id number]  Unit/Bed#: S -01 Encounter: [id number]      Assessment:   Patient admitted due to UTI  History of anemia, ESRD on dialysis, GERD, HLD, diabetes, CVA  Wound care consulted for sacral wound  Patient agreeable to assessment, is mostly [place] speaking, able to follow commands and answer yes or no questions, alert and oriented x2, incontinent of bowel and bladder, assist of 1 to turn for assessment, wedges placed for turning and repositioning, heels elevated off of mattress, is dependent for care, P-500 low air loss mattress ordered  Nutrition is following  1  Pressure injury sacrum, evolving DTI-POA- Wound is oval in shape, moist, true depth unknown, is approx  10^ yellow adhered tissue and 90% non-blanchable deep purple tissue, no drainage noted  Jackie-wound is dry, intact, blanchable hypopigmented scar tissue and blanchable hyperpigmented skin  This wound has the potential to evolve to a full thickness injury, stage 3 or 4     2  Pressure injury right hip, DTI-POA- Wound is irregular in shape, dry and intact skin, 100% non-blanchable purple skin, no open aspects  Jackie-wound is dry, intact, blanchable skin  This wound has the potential to evolve to a full thickness injury, stage 3 or 4     3  Pressure injury left medial foot, DTI-POA- Two wounds pictured and measured together  Wounds are oval in shape, dry and intact skin, 100% non-blanchable purple skin, no open aspects  Jackie-wounds are dry, intact, blanchable pink and blanchable red skin  This wound has the potential to evolve to a full thickness injury, stage 3 or 4     4  Bilateral heels are dry, intact, blanchable pink skin, scaly skin  5  Left hip is dry, intact, blanchable red skin  Educated patient on importance of frequent offloading of pressure via turning, repositioning, and weight-shifting      No induration, fluctuance, odor, warmth, redness, or purulence noted to the above noted wounds  New dressings applied  Patient tolerated well  Primary nurse aware of plan of care  See flow sheets for more detailed assessment findings  Will follow along  Skin care Plan:  1-Cleanse sacro-buttocks with soap and water  Apply Xeroform over open wound bed on sacrum and cover with Allevyn foam  Cecil with T for treatment  Change every other day and PRN  Peel back and check skin daily  2-Turn/reposition q2h for pressure re-distribution on skin   3-Elevate heels to offload pressure  4-Moisturize skin daily with skin nourishing cream  5-Ehob cushion in chair when out of bed  6-Hydraguard to bilateral heels BID and PRN  7-Right hip and left medial foot DTI's- Cleanse with soap and water, pat dry  Apply Allevyn foam dressing over wounds  Peel back and check skin daily  Change every 3 days and Prn for dislodgment   8-P-500 low air loss mattress  9-Wound care will follow along with patient weekly, please call or tiger text with questions and concerns        Wound 12/04/21 Pressure Injury Sacrum (Active)   Wound Image    01/19/22 1306   Wound Description Light purple;Yellow; Other (Comment) 01/19/22 1306   Pressure Injury Stage DTPI 01/19/22 1306   Jackie-wound Assessment Dry; Intact; Hyperpigmented;Pink;Scar Tissue 01/19/22 1306   Wound Length (cm) 2 cm 01/19/22 1306   Wound Width (cm) 1 cm 01/19/22 1306   Wound Depth (cm) 0 1 cm 01/19/22 1306   Wound Surface Area (cm^2) 2 cm^2 01/19/22 1306   Wound Volume (cm^3) 0 2 cm^3 01/19/22 1306   Calculated Wound Volume (cm^3) 0 2 cm^3 01/19/22 1306   Change in Wound Size % -100 01/19/22 1306   Tunneling 0 cm 01/19/22 1306   Undermining 0 01/19/22 1306   Drainage Amount None 01/19/22 1306   Non-staged Wound Description Not applicable 48/12/94 3007   Treatments Cleansed;Site care 01/19/22 1306   Dressing Vaseline gauze; Foam, Silicon (eg  Allevyn, etc) 01/19/22 1306   Wound packed?  No 01/19/22 1306   Dressing Changed Changed 01/19/22 1120   Patient Tolerance Tolerated well 01/19/22 1306   Dressing Status Clean;Dry; Intact 01/19/22 1306       Wound 01/19/22 Foot Anterior; Left (Active)   Wound Image   01/19/22 1301   Wound Description Dry; Intact; Light purple 01/19/22 1301   Pressure Injury Stage DTPI 01/19/22 1301   Jackie-wound Assessment Dry; Intact; [place] 01/19/22 1301   Wound Length (cm) 1 cm 01/19/22 1301   Wound Width (cm) 9 5 cm 01/19/22 1301   Wound Depth (cm) 0 cm 01/19/22 1301   Wound Surface Area (cm^2) 9 5 cm^2 01/19/22 1301   Wound Volume (cm^3) 0 cm^3 01/19/22 1301   Calculated Wound Volume (cm^3) 0 cm^3 01/19/22 1301   Tunneling 0 cm 01/19/22 1301   Undermining 0 01/19/22 1301   Drainage Amount None 01/19/22 1301   Non-staged Wound Description Not applicable 64/16/80 0846   Treatments Cleansed;Site care 01/19/22 1301   Dressing Foam, Silicon (eg  Allevyn, etc) 01/19/22 1301   Wound packed? No 01/19/22 1301   Dressing Changed Reinforced 01/19/22 1301   Patient Tolerance Tolerated well 01/19/22 1301   Dressing Status Clean;Dry; Intact 01/19/22 1301       Wound 01/19/22 Pressure Injury Hip Right (Active)   Wound Image   01/19/22 1303   Wound Description Dry; Intact; Light purple 01/19/22 1303   Pressure Injury Stage DTPI 01/19/22 1303   Jackie-wound Assessment Dry; Intact 01/19/22 1303   Wound Length (cm) 6 cm 01/19/22 1303   Wound Width (cm) 4 cm 01/19/22 1303   Wound Depth (cm) 0 cm 01/19/22 1303   Wound Surface Area (cm^2) 24 cm^2 01/19/22 1303   Wound Volume (cm^3) 0 cm^3 01/19/22 1303   Calculated Wound Volume (cm^3) 0 cm^3 01/19/22 1303   Tunneling 0 cm 01/19/22 1303   Undermining 0 01/19/22 1303   Drainage Amount None 01/19/22 1303   Non-staged Wound Description Not applicable 75/82/45 8936   Treatments Cleansed;Site care 01/19/22 1303   Dressing Foam, Silicon (eg  Allevyn, etc) 01/19/22 1303   Wound packed? No 01/19/22 1303   Dressing Changed Changed 01/19/22 1303   Patient Tolerance Tolerated well 01/19/22 1303   Dressing Status Clean;Dry; Intact 01/19/22 1303 Call or tigertext with any questions  Wound Care will continue to follow    Pat Virgen RN BSN  Wound care

## 2022-01-19 NOTE — PLAN OF CARE
Problem: Potential for Falls  Goal: Patient will remain free of falls  Description: INTERVENTIONS:  - Educate patient/family on patient safety including physical limitations  - Instruct patient to call for assistance with activity   - Consult OT/PT to assist with strengthening/mobility   - Keep Call bell within reach  - Keep bed low and locked with side rails adjusted as appropriate  - Keep care items and personal belongings within reach  - Initiate and maintain comfort rounds  - Make Fall Risk Sign visible to staff  - Offer Toileting every 2 Hours, in advance of need  - Initiate/Maintain bed alarm  - Obtain necessary fall risk management equipment:   - Apply yellow socks and bracelet for high fall risk patients  - Consider moving patient to room near nurses station  Outcome: Progressing     Problem: Prexisting or High Potential for Compromised Skin Integrity  Goal: Skin integrity is maintained or improved  Description: INTERVENTIONS:  - Identify patients at risk for skin breakdown  - Assess and monitor skin integrity  - Assess and monitor nutrition and hydration status  - Monitor labs   - Assess for incontinence   - Turn and reposition patient  - Assist with mobility/ambulation  - Relieve pressure over bony prominences  - Avoid friction and shearing  - Provide appropriate hygiene as needed including keeping skin clean and dry  - Evaluate need for skin moisturizer/barrier cream  - Collaborate with interdisciplinary team   - Patient/family teaching  - Consider wound care consult   Outcome: Progressing

## 2022-01-20 NOTE — PLAN OF CARE
Post-Dialysis RN Treatment Note    Blood Pressure:  Pre 111/54 mm/Hg  Post 118/55 mmHg   EDW  50 5 kg    Weight:  Pre 42 1 kg   Post 42 8 kg   Mode of weight measurement: Bed Scale   Volume Removed  0 ml    Treatment duration 180 minutes    NS given  No    Treatment shortened? No   Medications given during Rx Midodrine 5mg, rocephin 1gm   Estimated Kt/V  Not Applicable   Access type: Permacath/TDC   Access Issues: Yes, Difficulty aspirating from arterial lumen  Only able to reach blood flow of 250     Report called to primary nurse   Yes Laura Cade   Problem: METABOLIC, FLUID AND ELECTROLYTES - ADULT  Goal: Electrolytes maintained within normal limits  Description: INTERVENTIONS:  - Monitor labs and assess patient for signs and symptoms of electrolyte imbalances  - Administer electrolyte replacement as ordered  - Monitor response to electrolyte replacements, including repeat lab results as appropriate  - Instruct patient on fluid and nutrition as appropriate  Outcome: Progressing  Goal: Fluid balance maintained  Description: INTERVENTIONS:  - Monitor labs   - Monitor I/O and WT  - Instruct patient on fluid and nutrition as appropriate  - Assess for signs & symptoms of volume excess or deficit  Outcome: Progressing

## 2022-01-20 NOTE — PROGRESS NOTES
[facility]  Progress Note Rhys Re 1950, 73 y o  female MRN: [id number]  Unit/Bed#: S -01 Encounter: [id number]  Primary Care Provider: Carline Jacobo MD   Date and time admitted to hospital: 1/17/2022 11:32 AM    * UTI (urinary tract infection)  Assessment & Plan  Patient found to have UA with innumerable bacteria and WBC, and 10-20 RBC   CT abdomen showing air in bladder and kidneys  Patient endorsing dysuria, frequency  Lactic acid 1 9    - Urinary retention protocol, bladder scan as needed  - Monitor VS, CBC  - Follow up blood cultures - negative to date  - Urine cultures growing klebsiella, susceptible to current antibiotics  - Ceftriaxone 1g daily, (to be given after dialysis when applicable) Day #3 (did not receive yesterday)      Hypotension  Assessment & Plan  - Patient found to be hypotensive with SBP in 80s, brought to hospital by EMS    - Has improved to low normal range  - Continue to monitor BP  - Restart home metoprolol 25mg      ESRD (end stage renal disease) on dialysis [facility])  Assessment & Plan  Lab Results   Component Value Date    EGFR 9 01/20/2022    EGFR 11 01/19/2022    EGFR 15 01/18/2022    CREATININE 4 47 (H) 01/20/2022    CREATININE 3 73 (H) 01/19/2022    CREATININE 2 94 (H) 01/18/2022     - Elevated Troponin and BNP likely due to ESRD  - Continue home nephrocaps, sevelamer  - Continue hemodialysis Tues, Thurs, Saturday, or as indicated  - Continue midodrine 5mg pre-dialysis  - Nephrology following    Diarrhea  Assessment & Plan  Patient reports 10 day history of diarrhea, reports 3-5 episodes per day    Denies blood in stool or black tarry stool     - C diff negative  - Stool enteric panel negative      Hypercalcemia  Assessment & Plan  Elevated corrected calcium in setting of poor oral intake    Recent Labs     01/18/22  0635 01/18/22  0635 01/19/22  0815 01/20/22  0923   CALCIUM 9 5  --  9 9 10 1   ALB 2 0*   < > 2 0*  --    CORRECTEDCA 11 1*   < > 11 5*  --     < > = values in this interval not displayed  -  Ionized calcium level  - s/p Isolyte 50cc/hr x 10 hours  - nephrology following      Neuropathy of both feet  Assessment & Plan  POA, patient complaining of bilateral foot pain, non localized, nontender to palpation, bilateral feet stopping at ankles  Per daughter in law, patient has poor sensation of both feet        Elevated troponin  Assessment & Plan  Initially 277, two hours later 259 to 287  - Likely due to ESRD and active infection  - EKG negative for acute cardiac event  - Monitor for chest pain, cardiac symptom    Dysphagia  Assessment & Plan  - Patient on "minced and moist" diet at nursing home   - Started on carbohydrate controlled dysphagia diet  - Monitor intake    Ischemic cardiomyopathy  Assessment & Plan  - No acute changes  - Continue home entresto and plavix    CVA (cerebral vascular accident) ([address] )  Assessment & Plan  - History of CVA with residual aphasia  - Limited historian, difficulty with  service      Encephalopathy  Assessment & Plan  At home on perphenazine 4mg TID  Held in the setting of mental status change    HTN (hypertension)  Assessment & Plan  History of essential hypertension, on metoprolol and midodrine   - Midodrine 5mg to be given predialysis  - Monitor BP  -Restart home metoprolol    HLD (hyperlipidemia)  Assessment & Plan  - Continue home lipitor 10mg  - Continue home cholestyramine    GERD (gastroesophageal reflux disease)  Assessment & Plan  Continue protonix 40mg daily    Depression  Assessment & Plan  Continue home sertraline     Type 2 diabetes mellitus (HCC)  Assessment & Plan  Lab Results   Component Value Date    HGBA1C 10 3 (H) 12/06/2021       Recent Labs     01/19/22  1142 01/19/22  1611 01/20/22  0703 01/20/22  1113   POCGLU 255* 96 183* 90       Blood Sugar Average: Last 72 hrs:  (P) 133     - Discontinued home lantus for now, due to hypoglycemia  - Monitor blood glucose, SSI as needed      VTE Pharmacologic Prophylaxis: VTE Score: 5 High Risk (Score >/= 5) - Pharmacological DVT Prophylaxis Ordered: heparin  Sequential Compression Devices Ordered  Patient Centered Rounds: I performed bedside rounds with nursing staff today  Discussions with Specialists or Other Care Team Provider: case management    Education and Discussions with Family / Patient: Updated  (son and daughter in law) at bedside  Current Length of Stay: 3 day(s)  Current Patient Status: Inpatient   Discharge Plan: Pending    Code Status: Level 3 - DNAR and DNI    Subjective:   Overnight, patient was noted to have become hypotensive to SBP 80s and hypoxic to 80s, was placed on 2L NC  On evaluation this morning, patient appeared lethargic, was not verbally responsive as she usually is  Chest X-ray, ABG, D-Dimer, EKG were ordered and reviewed  Trended BMPs demonstrated BUN increased to 70s from baseline of around 20 in BMP prior to admission  Altered mental status likely due to uremia, and improved with dialysis on re-evaluation in the afternoon  Goals of care discussion had with son, daughter-in-law, attending physician, senior resident in [place]  After in depth discussion regarding prognosis and illness, patient and family ultimately wish to take patient home with assistance, and plan to continue dialysis, though wish to minimize other interventions whenever possible  Case management will discuss with family regarding logistics and insurance coverage of home help and transportation  Family is aware they may need to pay out of pocket for home nursing care, and would like more information  They also wish to involve her other son, and will discuss this with him  Case management to assist with logistics of disposition  Patient and family also discussed patient's code status in depth   Patient states she wants to be "peaceful" and family agreed patient would not wish to have chest compressions or intubation in the setting of cardiac or respiratory arrest  Code status therefore changed to Level 3  Objective:     Vitals:   Temp (24hrs), Av 3 °F (36 8 °C), Min:97 5 °F (36 4 °C), Max:99 4 °F (37 4 °C)    Temp:  [97 5 °F (36 4 °C)-99 4 °F (37 4 °C)] 97 5 °F (36 4 °C)  HR:  [] 111  Resp:  [16-18] 18  BP: ()/(42-60) 117/54  SpO2:  [90 %-100 %] 90 %  Body mass index is 19 19 kg/m²  Input and Output Summary (last 24 hours): Intake/Output Summary (Last 24 hours) at 2022 1553  Last data filed at 2022 1300  Gross per 24 hour   Intake 770 ml   Output 300 ml   Net 470 ml       Physical Exam:   Physical Exam  Vitals and nursing note reviewed  Constitutional:       Appearance: She is ill-appearing  HENT:      Head: Normocephalic and atraumatic  Eyes:      Conjunctiva/sclera: Conjunctivae normal    Cardiovascular:      Rate and Rhythm: Normal rate and regular rhythm  Heart sounds: Normal heart sounds  No murmur heard  Pulmonary:      Effort: Pulmonary effort is normal  No respiratory distress  Abdominal:      General: There is no distension  Palpations: Abdomen is soft  There is no mass  Tenderness: There is no abdominal tenderness  Genitourinary:     Comments: No suprapubic tenderness  Musculoskeletal:      Right lower leg: No edema  Left lower leg: No edema  Skin:     General: Skin is warm and dry  Neurological:      Mental Status: She is lethargic        Comments: Lethargic, limited verbal responses, unable to focus on evaluation          Additional Data:     Labs:  Results from last 7 days   Lab Units 22  0923   WBC Thousand/uL 6 29   HEMOGLOBIN g/dL 8 9*   HEMATOCRIT % 28 1*   PLATELETS Thousands/uL 257   NEUTROS PCT % 48   LYMPHS PCT % 44   MONOS PCT % 7   EOS PCT % 0     Results from last 7 days   Lab Units 22  0923 22  0815 22  0815   SODIUM mmol/L 138   < > 137   POTASSIUM mmol/L 4 0   < > 4 5   CHLORIDE mmol/L 106   < > 106   CO2 mmol/L 20*   < > 20*   BUN mg/dL 77*   < > 73*   CREATININE mg/dL 4 47*   < > 3 73*   ANION GAP mmol/L 12   < > 11   CALCIUM mg/dL 10 1   < > 9 9   ALBUMIN g/dL  --   --  2 0*   TOTAL BILIRUBIN mg/dL  --   --  0 33   ALK PHOS U/L  --   --  117*   ALT U/L  --   --  30   AST U/L  --   --  47*   GLUCOSE RANDOM mg/dL 59*   < > 100    < > = values in this interval not displayed  Results from last 7 days   Lab Units 01/20/22  1153 01/20/22  1113 01/20/22  0703 01/19/22  1611 01/19/22  1142 01/19/22  0716 01/18/22  2035 01/18/22  1616 01/18/22  1132 01/18/22  0823 01/17/22  1253   POC GLUCOSE mg/dl 92 90 183* 96 255* 134 132 97 89 81 173*         Results from last 7 days   Lab Units 01/17/22  1227   LACTIC ACID mmol/L 1 9       Lines/Drains:  Invasive Devices  Report    Central Venous Catheter Line            CVC Central Lines 12/10/21 Double 41 days          Hemodialysis Catheter            HD Permanent Double Catheter 17 days                Central Line:  Goal for removal: N/A - Chronic PICC             Imaging: pertinent imaging reviewed    Recent Cultures (last 7 days):   Results from last 7 days   Lab Units 01/17/22  1624 01/17/22  1340 01/17/22  1236 01/17/22  1227   BLOOD CULTURE   --   --  No Growth at 72 hrs  No Growth at 72 hrs     URINE CULTURE  >100,000 cfu/ml Klebsiella pneumoniae*  --   --   --    C DIFF TOXIN B BY PCR   --  Negative  --   --        Last 24 Hours Medication List:   Current Facility-Administered Medications   Medication Dose Route Frequency Provider Last Rate    aspirin  81 mg Oral Daily Dea Landis MD      atorvastatin  10 mg Oral Daily Dae Landis MD      b complex-vitamin C-folic acid  1 capsule Oral Daily With Viktor Russell MD      cefTRIAXone  1,000 mg Intravenous Q24H Dae Landis MD 1,000 mg (01/20/22 1230)    clopidogrel  75 mg Oral Daily Dae Landis MD      heparin (porcine)  5,000 Units Subcutaneous Q8H [facility] Jhony Lopez MD      insulin lispro  1-5 Units Subcutaneous TID [facility] Jhony Lopez MD      latanoprost  1 drop Both Eyes HS Jhony Lopez MD      metoprolol tartrate  25 mg Oral Daily Jhony Lopez MD      midodrine  5 mg Oral Before Dialysis Jhony Lopez MD      sertraline  25 mg Oral Daily Jhony Lopez MD          Today, Patient Was Seen By: Jhony Lopez MD    **Please Note: This note may have been constructed using a voice recognition system  **

## 2022-01-20 NOTE — ASSESSMENT & PLAN NOTE
Elevated corrected calcium in setting of poor oral intake    Recent Labs     01/18/22  0635 01/18/22  0635 01/19/22  0815 01/20/22  0923   CALCIUM 9 5  --  9 9 10 1   ALB 2 0*   < > 2 0*  --    CORRECTEDCA 11 1*   < > 11 5*  --     < > = values in this interval not displayed       -  Ionized calcium level  - s/p Isolyte 50cc/hr x 10 hours  - nephrology following

## 2022-01-20 NOTE — DISCHARGE INSTRUCTIONS
Infección en el tracto urinario en adultos mayores   LO QUE NECESITA SABER:   Kianna infección en el tracto urinario (ITU) ocurre cuando entran bacterias en estes tracto urinario  Estes tracto urinario incluye tremayne riñones, uréteres, vejiga y [place]ondregnies  La orina es producida en los riñones y fluye del uréter a la vejiga  La orina sale de la vejiga a través de la uretra  Kianna infección del tracto urinario es más común in [place] parte inferior de estes tracto urinario que incluye estes vejiga y uretra  INSTRUCCIONES SOBRE EL SARA HOSPITALARIA:   Busque atención médica de inmediato si:  · Usted está orinando muy poco o nada en absoluto  · Usted está vomitando  · Usted tiene fiebre sara con temblor y escalofríos  · Usted tiene dolor en el costado o en la espalda que CHRISTY  Llame a estes médico si:  · Tiene fiebre  · Es [place], y el flujo vaginal christie o amarillo ha aumentado  · Usted no siente mejoría después de 2 días de savanah los antibióticos  · Usted tiene preguntas o inquietudes acerca de estes condición o cuidado  Medicamentos:  · Los medicamentos ayudan a tratar la infección bacteriana o disminuir el dolor y la quemazón cuando usted [facility]  Es probable que usted también necesite medicamentos para disminuir la urgencia de orinar con frecuencia  Si tiene infecciones urinarias con frecuencia (llamadas recurrentes), se le pueden angelo antibióticos para que los tome regularmente  [place] cuándo y [employer] antibióticos  El objetivo es prevenir las infecciones urinarias, braulio no causar resistencia a los antibióticos mediante el uso de antibióticos con demasiada frecuencia  · [place] tremayne medicamentos riya se le haya indicado  Consulte con estse médico si usted mo que estes medicamento no le está ayudando o si presenta efectos secundarios  Infórmele si es alérgico a cualquier medicamento  Mantenga kianna lista actualizada de los [employer], las vitaminas y los productos herbales que leobardo   Incluya los siguientes datos de los medicamentos: cantidad, frecuencia y motivo de administración  Traiga con usted la lista o los envases de las píldoras a tremayne citas de seguimiento  Lleve la lista de los medicamentos con usted en kervin de kianna emergencia  Cuidados personales:  · [place] líquidos riya se le haya indicado  Los líquidos pueden ayudar a eliminar las bacterias del tracto urinario  Pregunte cuánto líquido debe savanah cada día y cuáles líquidos son los más adecuados para usted  Es probable que usted necesite savanah más líquidos de lo habitual para ayudar a deshacerse de la bacteria  No tome alcohol, cafeína ni jugos cítricos  Estos pueden irritar estes vejiga y aumentar tremayne síntomas  · Aplique calor sobre el abdomen de 20 a 30 minutos cada 2 horas por los [employer] indiquen  El calor ayuda a disminuir las molestias y la presión en estes vejiga  Evite kianan ITU:  · Orine cuando sienta el impulso de hacerlo  No contenga la orina  Las bacterias pueden crecer si la orina permanece demasiado tiempo en la vejiga  Puede ser Ellin Humbles orinar al menos cada 3 o 4 horas  · Orine después de [employer] sexuales para eliminar las bacterias que pudieran ingresar en el tracto urinario camilo las relaciones sexuales  · Use ropa interior de algodón y ropa suelta  Los pantalones ajustados y la ropa interior de nylon pueden atrapar humedad y hacer que las bacterias crezcan  · El jugo de arándano o los suplementos de arándano pueden ayudar a prevenir las infecciones urinarias  Estes médico puede recomendarle el jugo o suplemento adecuado para usted  · Las mujeres deberían limpiarse de adelante hacia atrás después de orinar o de realizar kianna evacuación intestinal  [place] podría evitar que los gérmenes entren en el tracto urinario  No tome duchas vaginales ni use desodorantes femeninos  Estos pueden cambiar el equilibrio químico de la vagina  También podrían darle un medicamento vaginal con estrógeno   [place] medicamento ayuda a [employer] infecciones urinarias recurrentes en mujeres que mckeon pasado por la menopausia o que están en la perimenopausia  Acuda a la consulta de control con estes médico según las indicaciones: Anote tremayne preguntas para que se acuerde de hacerlas camilo tremayne visitas  © Copyright [employer] 2021 Information is for End User's use only and may not be sold, redistributed or otherwise used for commercial purposes  All illustrations and images included in CareNotes® are the copyrighted property of [employer]  or [address] [place] [address] es sólo para uso en educación  Estes intención no es darle un consejo médico sobre enfermedades o tratamientos  Colsulte con estes Sanam Ang[place] farmacéutico antes de seguir cualquier régimen médico para saber si es seguro y efectivo para usted

## 2022-01-20 NOTE — ASSESSMENT & PLAN NOTE
Patient found to have UA with innumerable bacteria and WBC, and 10-20 RBC   CT abdomen showing air in bladder and kidneys  Patient endorsing dysuria, frequency  Lactic acid 1 9    - Urinary retention protocol, bladder scan as needed  - Monitor VS, CBC  - Follow up blood cultures - negative to date  - Urine cultures growing klebsiella, susceptible to current antibiotics  - Ceftriaxone 1g daily, (to be given after dialysis when applicable) Day #3 (did not receive yesterday)

## 2022-01-20 NOTE — SPEECH THERAPY NOTE
Speech Language/Pathology    Speech/Language Pathology Progress Note    Patient Name: Chapo Waller  SZDNS'R Date: 1/20/2022     Consult rec'd for swallow eval  Attempted to see pt for eval at lunch  Pt currently on HD, spoke w/ RN, pt very lethargic, difficult arouse, breakfast and po meds have been held  Will hold eval at this time, RN to call if pt more arousable later and appropriate for po/swallow assessment       Jignesh Salter CCC-SLP  Speech Pathologist  Available via  tiger text

## 2022-01-20 NOTE — DISCHARGE SUMMARY
[facility]  Discharge- Alvie Hathorne 1950, 73 y o  female MRN: [id number]  Unit/Bed#: S -01 Encounter: [id number]  Primary Care Provider: [place] Antoine MD   Date and time admitted to hospital: 1/17/2022 11:32 AM    * UTI (urinary tract infection)  Assessment & Plan  Patient found to have UA with innumerable bacteria and WBC, and 10-20 RBC   CT abdomen showing air in bladder and kidneys  Patient endorsing dysuria, frequency  Lactic acid 1 9    - Urinary retention protocol, bladder scan as needed  - Monitor VS, CBC  - Follow up blood cultures - negative to date  - Urine cultures growing klebsiella, susceptible to current antibiotics  - Ceftriaxone 1g daily, (to be given after dialysis when applicable) Day #4 (did not receive yesterday)  - Will be discharged on Cefdenir to complete 5 day course  Hypotension  Assessment & Plan  - Patient found to be hypotensive with SBP in 80s, brought to hospital by EMS    - Has improved to low normal range  - Continue to monitor BP  - Restart home metoprolol 25mg      ESRD (end stage renal disease) on dialysis [facility])  Assessment & Plan  Lab Results   Component Value Date    EGFR 18 01/21/2022    EGFR 9 01/20/2022    EGFR 11 01/19/2022    CREATININE 2 55 (H) 01/21/2022    CREATININE 4 47 (H) 01/20/2022    CREATININE 3 73 (H) 01/19/2022     - Elevated Troponin and BNP likely due to ESRD  - Continue home nephrocaps, sevelamer  - Continue hemodialysis Tues, Thurs, Saturday, or as indicated  - Continue midodrine 5mg pre-dialysis  - Nephrology following    Diarrhea  Assessment & Plan  Patient reports 10 day history of diarrhea, reports 3-5 episodes per day    Denies blood in stool or black tarry stool     - C diff negative  - Stool enteric panel negative      Hypercalcemia  Assessment & Plan  Elevated corrected calcium in setting of poor oral intake    Recent Labs     01/19/22  0815 01/19/22  0815 01/20/22  0923 01/21/22  0503   CALCIUM 9 9 --  10 1 9 4   ALB 2 0*   < >  --  2 4*   CORRECTEDCA 11 5*   < >  --  10 7*    < > = values in this interval not displayed  -  Ionized calcium level  - s/p Isolyte 50cc/hr x 10 hours  - nephrology following      Neuropathy of both feet  Assessment & Plan  POA, patient complaining of bilateral foot pain, non localized, nontender to palpation, bilateral feet stopping at ankles  Per daughter in law, patient has poor sensation of both feet    - can consider gabapentin as outpatient if needed, will hold off for now    Elevated troponin  Assessment & Plan  Initially 277, two hours later 259 to 287  - Likely due to ESRD and active infection  - EKG negative for acute cardiac event  - Monitor for chest pain, cardiac symptom    Dysphagia  Assessment & Plan  - Patient on "minced and moist" diet at nursing home   - Started on carbohydrate controlled dysphagia diet  - Monitor intake    Ischemic cardiomyopathy  Assessment & Plan  - No acute changes  - Continue home entresto and plavix    CVA (cerebral vascular accident) ([facility] [address] )  Assessment & Plan  - History of CVA with residual aphasia  - Limited historian, difficulty with  service      Encephalopathy  Assessment & Plan  At home on perphenazine 4mg TID  Held in the setting of mental status change      HTN (hypertension)  Assessment & Plan  History of essential hypertension, on metoprolol and midodrine   - Midodrine 5mg to be given predialysis  - Monitor BP  - Continue home metoprolol    HLD (hyperlipidemia)  Assessment & Plan  - Continue home lipitor 10mg  - Continue home cholestyramine    GERD (gastroesophageal reflux disease)  Assessment & Plan  Continue protonix 40mg daily    Depression  Assessment & Plan  Continue home sertraline     Type 2 diabetes mellitus ([facility] )  Assessment & Plan  Lab Results   Component Value Date    HGBA1C 10 3 (H) 12/06/2021       Recent Labs     01/20/22  1113 01/20/22  1153 01/20/22  1629 01/21/22  0734   POCGLU 90 92 133 106       Blood Sugar Average: Last 72 hrs:  (P) 124     - Discontinued home lantus for now, due to hypoglycemia  - Monitor blood glucose, SSI as needed      Medical Problems                 Resolved Problems  Date Reviewed: 1/16/2022      None                  Discharging Resident: Anatoliy Moctezuma MD  Discharging Attending: Radha Velazquez MD  PCP: Carline Jacobo MD  Admission Date:   Admission Orders (From admission, onward)       Ordered        01/17/22 1706  Inpatient Admission  Once                          Discharge Date: 01/21/22    Consultations During Hospital Stay:  Nephrology    Procedures Performed:   Chest X-ray  CT abdomen/pelvis  Chest X-ray  Hemodialysis    Significant Findings / Test Results:   See hospital course    Incidental Findings:   None     Test Results Pending at Discharge (will require follow up): None     Outpatient Tests Requested:  None    Complications:  None    Reason for Admission: Hypotension    Hospital Course:   Ashley Hugo is a 73 y o  female patient who originally presented to the hospital on 1/17/2022 due to hypotension  Patient was on the way to a doctor's appointment when she was found to be hypotensive  In the emergency room, workup was significant for urinalysis with bacteria, leukocytes, red blood cells  CT of the abdomen showed gas in the right renal collecting system and bladder lumen  Creatinine was at baseline at 2 75, BNP in was 15,000, troponin elevated up to 287, believed to be caused by AS RD  On evaluation patient was endorsing dysuria, frequency, as well as diarrhea for 10 days  She was started on ceftriaxone 1 g daily, was seen by Nephrology to monitor her renal function and to receive dialysis when appropriate  On 01/20/2022, patient became lethargic, was less verbally responsive, and ill-appearing  EKG, chest x-ray, D-dimer, ABG negative for acute abnormalities  Patient thought to be uremic, hemodialysis improved mental status    Goals of care discussion was had with family on 01/20, and it was determined that family would prefer to take the patient home so she can be with family, however at this time they are unable to coordinate visiting nursing and 24 hour care for her  Therefore they elected to have the patient return to her previous arrangements at the nursing home, and will coordinate further outside of the hospital     Please see above list of diagnoses and related plan for additional information  Condition at Discharge: stable    Discharge Day Visit / Exam:   Subjective: On the day of discharge, patient was found resting comfortably in no acute distress  She was more awake and talkative this morning, brighter in affect  She denies pain, though endorsed foot pain when asked directly  Patient states she has had one episode of diarrhea last night  She states she feels better overall  She is denying chest pain, shortness of breath, abdominal pain, n/v   Vitals: Blood Pressure: 133/61 (01/21/22 0637)  Pulse: 89 (01/21/22 0637)  Temperature: 98 1 °F (36 7 °C) (01/21/22 0637)  Temp Source: Oral (01/21/22 3976)  Respirations: 16 (01/21/22 3377)  Weight - Scale: 50 7 kg (111 lb 12 4 oz) (01/17/22 1135)  SpO2: 96 % (01/21/22 3141)  Exam:   Physical Exam  Vitals and nursing note reviewed  Constitutional:       General: She is not in acute distress  Appearance: She is not toxic-appearing  HENT:      Head: Normocephalic and atraumatic  Eyes:      Conjunctiva/sclera: Conjunctivae normal    Cardiovascular:      Rate and Rhythm: Normal rate and regular rhythm  Heart sounds: Normal heart sounds  No murmur heard  Pulmonary:      Effort: Pulmonary effort is normal  No respiratory distress  Abdominal:      General: There is no distension  Palpations: Abdomen is soft  There is no mass  Tenderness: There is no abdominal tenderness  Musculoskeletal:      Right lower leg: No edema  Left lower leg: No edema     Skin:     General: Skin is warm and dry  Neurological:      General: No focal deficit present  Mental Status: She is alert  Discussion with Family: Updated  (daughter in law) via phone  Discharge instructions/Information to patient and family:   See after visit summary for information provided to patient and family  Provisions for Follow-Up Care:  See after visit summary for information related to follow-up care and any pertinent home health orders  Disposition:   Skilled nursing facility    Planned Readmission: none    Discharge Medications:  See after visit summary for reconciled discharge medications provided to patient and/or family        **Please Note: This note may have been constructed using a voice recognition system**

## 2022-01-20 NOTE — PROGRESS NOTES
[facility] PROGRESS NOTE   Garlan Bamberger 73 y o  female MRN: [id number]  Unit/Bed#: S -01 Encounter: [id number]  Reason for Consult:  ESRD on hemodialysis    Summary:  Garlan Bamberger is a 73 y o  female medical history of ESRD recently transitioned from PD to hemodialysis currently on a TTS schedule, hypertension, diabetes mellitus, hyperlipidemia, depression, GERD, CVA, cardiomyopathy who was admitted to S LT after presenting with hypotension/diarrhea/urinary complaints  She has had a significant decline in functional status the last few months  ASSESSMENT and PLAN:  1  ESRD on hemodialysis Suzi Cortes, ORTIZ):    · Previously on PD and recently transition to hemodialysis  Target weight 50 5 kg but patient is significantly below target weight  Muscle wasting noted  Seen on hemodialysis treatment today  Blood pressure had dropped to 80 systolic  Given a bolus of normal saline with no significant improvement  · Plan:  · Give 1 bolus of albumin 25 g with 200 mL saline bolus  · Give a dose of midodrine  · No UF  · Continue to monitor  2  Access:    · Left IJ PermCath inserted 01/16/2021  PD catheter remains in place  Outpatient records reviewed  Per nursing facility note dated 01/14 general surgery was consulted for removal of PD catheter since there is no plan to return to PD   3  Blood pressure/volume status:    · See above  Inter dialytic drop in blood pressure  Will give saline/albumin/midodrine  Continue to monitor  Continue midodrine predialysis and also give a dose in mid treatment if needed  Continue to hold Entresto prior to dialysis  4  Anemia:  Recent CVA not on TAJ  5  CKD MBD:  Continue vitamin D3 supplement  Patient's outpatient phosphorus level was low likely related to poor oral intake  No phosphorus restriction at this time, hold binder  Last outpatient   No indication for calcimemetic  6   Cardiomyopathy:  Ejection fraction 35-40%    Recently started on Entresto  If patient continues to have blood pressure issue consider discontinuing Entresto and continuing metoprolol  7  Diabetes mellitus:  Management per primary team  8  Pressure wounds:  Please see media pictures  9  Failure to thrive:  Significant decline in functional status  Now contracted with muscle wasting  Consider goals of care meeting  DISPOSITION:  Albumin/midodrine/saline during hemodialysis due to drop in blood pressure  No UF    SUBJECTIVE / 24H INTERVAL HISTORY:  Patient speaking in [address]  Difficult to speak with patient through   OBJECTIVE:  Current Weight: Weight - Scale: 50 7 kg (111 lb 12 4 oz)  Vitals:    01/20/22 0027 01/20/22 0028 01/20/22 0208 01/20/22 0700   BP: (!) 84/53 (!) 85/55 92/54 119/60   BP Location: Right arm Right arm Right arm Right arm   Pulse:   (!) 110 98   Resp:   18 18   Temp:    98 °F (36 7 °C)   TempSrc:    Axillary   SpO2: 100%  99% 100%   Weight:           Intake/Output Summary (Last 24 hours) at 1/20/2022 1041  Last data filed at 1/19/2022 1716  Gross per 24 hour   Intake 120 ml   Output --   Net 120 ml     General:  Chronically ill and extremely debilitated female  Skin: no rash, skin cool and dry  Eyes: anicteric sclera  ENT: moist mucous membrane oropharynx moist  Neck: supple, hold head generally in 1 position, neck appear stiff  Chest: CTA b/l, no ronchii, no wheeze, no rubs, no rales, normal effort  CVS: s1s2, no murmur, no gallop, no rub, irregular rhythm  Abdomen: soft, nontender, nl sounds, nondistended  PD catheter in place  Extremities: no edema LE b/l, muscle contractures, muscle wasting  : no manuel  Neuro:  No significant change  Response to voice    Psych:  Overall depressive affect  Medications:    Current Facility-Administered Medications:     aspirin (ECOTRIN LOW STRENGTH) EC tablet 81 mg, 81 mg, Oral, Daily, Dulce Perez MD, 81 mg at 01/19/22 0942    atorvastatin (LIPITOR) tablet 10 mg, 10 mg, Oral, Daily, Vianney Jose MD, 10 mg at 01/19/22 8907    b complex-vitamin C-folic acid (NEPHROCAPS) capsule 1 capsule, 1 capsule, Oral, Daily With Dinner, Vianney Jose MD, 1 capsule at 01/19/22 1543    ceftriaxone (ROCEPHIN) 1 g/50 mL in dextrose IVPB, 1,000 mg, Intravenous, Q24H, Vianney Jose MD, Last Rate: 100 mL/hr at 01/1950, 1,000 mg at 01/1950    clopidogrel (PLAVIX) tablet 75 mg, 75 mg, Oral, Daily, Vianney Jose MD, 75 mg at 01/19/22 0943    heparin (porcine) subcutaneous injection 5,000 Units, 5,000 Units, Subcutaneous, Q8H [address], 5,000 Units at 01/20/22 0555 **AND** [CANCELED] Platelet count, , , AM Draw, Vianney Jose MD    insulin lispro (HumaLOG) 100 units/mL subcutaneous injection 1-5 Units, 1-5 Units, Subcutaneous, TID AC, 2 Units at 01/19/22 1239 **AND** Fingerstick Glucose (POCT), , , TID AC, Vianney Jose MD    latanoprost (XALATAN) 0 005 % ophthalmic solution 1 drop, 1 drop, Both Eyes, HS, Vianney Jose MD, 1 drop at 01/17/22 2111    metoprolol tartrate (LOPRESSOR) tablet 25 mg, 25 mg, Oral, Daily, Vianney Jose MD    midodrine (PROAMATINE) tablet 5 mg, 5 mg, Oral, Before Dialysis, Vianney Jose MD, 5 mg at 01/20/22 1036    sertraline (ZOLOFT) tablet 25 mg, 25 mg, Oral, Daily, Vianney Jose MD, 25 mg at 01/19/22 1398    Laboratory Results:  Results from last 7 days   Lab Units 01/20/22  0923 01/19/22  0815 01/18/22  0635 01/17/22  1227   WBC Thousand/uL 6 29 5 83 6 32 6 77   HEMOGLOBIN g/dL 8 9* 9 3* 9 4* 9 6*   HEMATOCRIT % 28 1* 29 2* 28 5* 30 5*   PLATELETS Thousands/uL 257 249 242 257   POTASSIUM mmol/L 4 0 4 5 3 2* 3 3*   CHLORIDE mmol/L 106 106 106 107   CO2 mmol/L 20* 20* 20* 22   BUN mg/dL 77* 73* 68* 58*   CREATININE mg/dL 4 47* 3 73* 2 94* 2 75*   CALCIUM mg/dL 10 1 9 9 9 5 9 6

## 2022-01-20 NOTE — SPEECH THERAPY NOTE
Speech-Language Pathology Bedside Swallow Evaluation        Patient Name: Beula Barthel    FNMQP'Q Date: 1/20/2022     Problem List  Principal Problem:    UTI (urinary tract infection)  Active Problems:    ESRD (end stage renal disease) on dialysis ([place] [address] )    Type 2 diabetes mellitus (HCC)    Depression    GERD (gastroesophageal reflux disease)    HLD (hyperlipidemia)    HTN (hypertension)    Encephalopathy    Hypotension    CVA (cerebral vascular accident) ([place] [address] )    Ischemic cardiomyopathy    Dysphagia    Elevated troponin    Diarrhea    Neuropathy of both feet    Hypercalcemia         Past Medical History  Past Medical History:   Diagnosis Date    Anemia due to chronic kidney disease     Atherosclerotic heart disease of native coronary artery without angina pectoris     Cardiac pacemaker     Dependence on renal dialysis ([place] [address] )     Dysphagia     End stage renal disease [facility])     GERD (gastroesophageal reflux disease)     Hyperlipidemia     Hypertensive chronic kidney disease with stage 5 chronic kidney disease or end stage renal disease ([place] [address] )     Hypotension 12/8/2021    Major depressive disorder, recurrent ([facility])     MRSA (methicillin resistant Staphylococcus aureus)     Pressure ulcer of sacral region, unstageable ([place] [address] )     Psychiatric disorder     anxiety    Type 2 diabetes mellitus with chronic kidney disease ([place] [address] )     Type 2 diabetes mellitus with diabetic nephropathy ([facility])     Type 2 diabetes mellitus with diabetic peripheral angiopathy without gangrene ([place] [address] )     Type 2 diabetes mellitus with hyperglycemia ([facility])     Type 2 diabetes mellitus with hypoglycemia ([facility])     Unspecified Escherichia coli (E  coli) as the cause of diseases classified elsewhere        Past Surgical History  Past Surgical History:   Procedure Laterality Date    IR TUNNELED CENTRAL LINE CHECK/CHANGE/REPOSITION  1/3/2022    IR TUNNELED DIALYSIS CATHETER PLACEMENT  12/10/2021       Summary    Pt presents with baseline oral/pharyngeal dysphagia due to prolonged oral processing of textured foods, pt spit out chopped foods (dental soft/dys 3); min throat clearing noted w/ thin liquids by straw, but none after initial throat clearing  Recommendations:   Diet: mechanically altered/level 2 diet and thin liquids   Meds: whole with puree and crushed with puree   Frequent Oral care: 2x/day  Aspiration precautions  Other Recommendations/ considerations: no follow up tx needed  Current Medical Status  Pt is a 73 y o  female who presented to [address] [place] [address]  with UTI  Patient was on route to doctor's appointment when she was noted to be hypotensive with systolic pressures in the 80s  Patient was taken to the emergency room  On workup, was noted to have UA positive for bacteria, leukocytes, and RBCs  CT abdomen and pelvis demonstrating air and bladder and kidney  Troponin elevated on admission as was BNP >15,000  Creatinine and renal function at baseline  Past medical history:   Please see H&P for details    Special Studies:  CXR: 1/20/22 No acute cardiopulmonary disease  Social/Education/Vocational Hx:  Pt lives [employer]   Current Risks for Dysphagia & Aspiration: known history of dysphagia and AMS  Current Symptoms/Concerns: known hx of dysphagia  Current Diet: soft/level 3 diet and thin liquids   Baseline Diet: minced and moist  Takes pills- ?  Crushed     Baseline Assessment   Behavior/Cognition: alert  Speech/Language Status: Estonian speaking   Patient Positioning: upright in bed     Swallow Mechanism Exam   Facial: symmetrical  Labial: WFL  Lingual: WFL  Velum: unable to visualize  Mandible: adequate ROM  Dentition: limited dentition -few natural on lower gums, edentulous upper  Vocal quality:clear/adequate   Volitional Cough: unable to initiate volitional cough   Respiratory: RA    Consistencies Assessed and Performance   Consistencies Administered: thin liquids, nectar thick, puree, mechanical soft solids and soft solids    Oral Stage: pt w/ adequate bolus retrieval via spoon and straw  Prolonged mastication/manipulation w/ banana, moreso chopped chicken and chopped carrots which she spit out  Pt w/ good oral control of liquids by straw  Pharyngeal Stage: swallows were timely and complete, throat clearing noted x1 w/ thin liquids by straw, ? Wet voice prior to taking thin liquids  No coughing or other signs/ symptoms w/ thin liquids by straw         Esophageal Concerns: none reported      Results Reviewed with: patient, RN and MD   Dysphagia Goals: none at this time    Jignesh Gama [facility]-SLP  Speech Pathologist  [place] license # [id number] [facility] [id number]  [place] license # [id number]  Available via [employer]

## 2022-01-20 NOTE — ASSESSMENT & PLAN NOTE
Initially 277, two hours later 259 to 287  - Likely due to ESRD and active infection  - EKG negative for acute cardiac event  - Monitor for chest pain, cardiac symptom

## 2022-01-20 NOTE — ASSESSMENT & PLAN NOTE
Lab Results   Component Value Date    HGBA1C 10 3 (H) 12/06/2021       Recent Labs     01/19/22  1142 01/19/22  1611 01/20/22  0703 01/20/22  1113   POCGLU 255* 96 183* 90       Blood Sugar Average: Last 72 hrs:  (P) 133     - Discontinued home lantus for now, due to hypoglycemia  - Monitor blood glucose, SSI as needed

## 2022-01-20 NOTE — ASSESSMENT & PLAN NOTE
Lab Results   Component Value Date    EGFR 9 01/20/2022    EGFR 11 01/19/2022    EGFR 15 01/18/2022    CREATININE 4 47 (H) 01/20/2022    CREATININE 3 73 (H) 01/19/2022    CREATININE 2 94 (H) 01/18/2022     - Elevated Troponin and BNP likely due to ESRD  - Continue home nephrocaps, sevelamer  - Continue hemodialysis Tues, Thurs, Saturday, or as indicated  - Continue midodrine 5mg pre-dialysis  - Nephrology following

## 2022-01-20 NOTE — ASSESSMENT & PLAN NOTE
POA, patient complaining of bilateral foot pain, non localized, nontender to palpation, bilateral feet stopping at ankles  Per daughter in law, patient has poor sensation of both feet

## 2022-01-21 NOTE — ASSESSMENT & PLAN NOTE
Lab Results   Component Value Date    EGFR 18 01/21/2022    EGFR 9 01/20/2022    EGFR 11 01/19/2022    CREATININE 2 55 (H) 01/21/2022    CREATININE 4 47 (H) 01/20/2022    CREATININE 3 73 (H) 01/19/2022     - Elevated Troponin and BNP likely due to ESRD  - Continue home nephrocaps, sevelamer  - Continue hemodialysis Tues, Thurs, Saturday, or as indicated  - Continue midodrine 5mg pre-dialysis  - Nephrology following

## 2022-01-21 NOTE — ASSESSMENT & PLAN NOTE
Patient found to have UA with innumerable bacteria and WBC, and 10-20 RBC   CT abdomen showing air in bladder and kidneys  Patient endorsing dysuria, frequency  Lactic acid 1 9    - Urinary retention protocol, bladder scan as needed  - Monitor VS, CBC  - Follow up blood cultures - negative to date  - Urine cultures growing klebsiella, susceptible to current antibiotics  - Ceftriaxone 1g daily, (to be given after dialysis when applicable) Day #4 (did not receive yesterday)  - Will be discharged on Cefdenir to complete 5 day course

## 2022-01-21 NOTE — CASE MANAGEMENT
Case Management Discharge Planning Note    Patient name Donato Mccullough  Location S /S -01 MRN [id number]  : 1950 Date 2022       Current Admission Date: 2022  Current Admission Diagnosis:UTI (urinary tract infection)   Patient Active Problem List    Diagnosis Date Noted    Neuropathy of both feet 2022    Hypercalcemia 2022    UTI (urinary tract infection) 2022    Elevated troponin 2022    Diarrhea 2022    Pressure injury of left buttock, stage 3 ([facility] ) 2022    Pressure injury of right buttock, stage 3 ([facility] ) 2022    Pressure injury of sacral region, unstageable ([facility] ) 2022    Ambulatory dysfunction 2021    Dysphagia 2021    Abnormal CT of the chest 2021    Stroke-like symptoms 2021    CAD (coronary artery disease) 12/10/2021    Ischemic cardiomyopathy 12/10/2021    Hypotension 2021    CVA (cerebral vascular accident) ([facility] ) 2021    Encephalopathy 2021    Hypernatremia 2021    HHNC [facility]osmolar [facility]otic [facility]) [facility] 2021    ESRD (end stage renal disease) on dialysis ([facility] ) 2021    Type 2 diabetes mellitus ([facility] ) 2021    Depression 2021    GERD (gastroesophageal reflux disease) 2021    HLD (hyperlipidemia) 2021    Pacemaker 2021    HTN (hypertension) 2021      LOS (days): 4  Geometric Mean LOS (GMLOS) (days): 3 80  Days to GMLOS:0     OBJECTIVE:  Risk of Unplanned Readmission Score: 27         Current admission status: Inpatient   Preferred Pharmacy:   [id number] [address] [facility], [address] [place] [address],   [address]    [address] [facility] [address]  Phone: [phone] [facility] [address]  Phone: [phone] Fax: [phone]    Primary Care Provider: Lor Whittington MD    Primary Insurance: MEDICARE  Secondary [facility] [address] [facility]    DISCHARGE DETAILS:    Discharge planning discussed with[de-identified] MAGGIE  Freedom of Choice: Yes  Comments - Freedom of Choice: Reviewed with MAGGIE the need for 24 hr care whether pt was on hospice or palliative care  She states she is not able to care for pt 24/7 as she works  DIL states she would want to talk with her family regarding hospice or palliative  CM explained pt would be able to return to the nursing facility while they decide the next steps and put things into place  MAGGIE is in agreement with this and would like to have pt return to [facility] at this time  CM contacted family/caregiver?: Yes  Were Treatment Team discharge recommendations reviewed with patient/caregiver?: Yes  Did patient/caregiver verbalize understanding of patient care needs?: Yes  Were patient/caregiver advised of the risks associated with not following Treatment Team discharge recommendations?: Yes    Contacts  Patient Contacts: Nidia Strickland  Relationship to Patient[de-identified] Family  Contact Method: Phone  Reason/Outcome: Continuity of [address]         Is the patient interested in [facility] at discharge?: No    DME Referral Provided  Referral made for DME?: No    Other Referral/Resources/Interventions Provided:  Interventions: SNF         Treatment Team Recommendation: SNF  Discharge Destination Plan[de-identified] SNF  Transport at Discharge : [facility] Ambulance  Dispatcher Contacted: Yes     Transported by [employer] and Unit #):  [address]  ETA of Transport (Date): 01/21/22  ETA of Transport (Time): 1400     Transfer Mode: Stretcher  Accompanied by: EMS personnel     IMM Given (Date):: 01/21/22  IMM Given to[de-identified] Family  Family notified[de-identified] [address] Name, [address] : [place]  Receiving Facility/Agency Phone Number: [phone]  Facility/Agency Fax Number: [phone]

## 2022-01-21 NOTE — PLAN OF CARE
Problem: Potential for Falls  Goal: Patient will remain free of falls  Description: INTERVENTIONS:  - Educate patient/family on patient safety including physical limitations  - Instruct patient to call for assistance with activity   - Consult OT/PT to assist with strengthening/mobility   - Keep Call bell within reach  - Keep bed low and locked with side rails adjusted as appropriate  - Keep care items and personal belongings within reach  - Initiate and maintain comfort rounds  - Make Fall Risk Sign visible to staff  - Offer Toileting every 4 Hours, in advance of need  - Initiate/Maintain bed alarm  - Apply yellow socks and bracelet for high fall risk patients  - Consider moving patient to room near nurses station  Outcome: Progressing     Problem: Prexisting or High Potential for Compromised Skin Integrity  Goal: Skin integrity is maintained or improved  Description: INTERVENTIONS:  - Identify patients at risk for skin breakdown  - Assess and monitor skin integrity  - Assess and monitor nutrition and hydration status  - Monitor labs   - Assess for incontinence   - Turn and reposition patient  - Assist with mobility/ambulation  - Relieve pressure over bony prominences  - Avoid friction and shearing  - Provide appropriate hygiene as needed including keeping skin clean and dry  - Evaluate need for skin moisturizer/barrier cream  - Collaborate with interdisciplinary team   - Patient/family teaching  - Consider wound care consult   Outcome: Progressing     Problem: MOBILITY - ADULT  Goal: Maintain or return to baseline ADL function  Description: INTERVENTIONS:  -  Assess patient's ability to carry out ADLs; assess patient's baseline for ADL function and identify physical deficits which impact ability to perform ADLs (bathing, care of mouth/teeth, toileting, grooming, dressing, etc )  - Assess/evaluate cause of self-care deficits   - Assess range of motion  - Assess patient's mobility; develop plan if impaired  - Assess patient's need for assistive devices and provide as appropriate  - Encourage maximum independence but intervene and supervise when necessary  - Involve family in performance of ADLs  - Assess for home care needs following discharge   - Consider OT consult to assist with ADL evaluation and planning for discharge  - Provide patient education as appropriate  Outcome: Progressing  Goal: Maintains/Returns to pre admission functional level  Description: INTERVENTIONS:  - Perform BMAT or MOVE assessment daily    - Set and communicate daily mobility goal to care team and patient/family/caregiver  - Collaborate with rehabilitation services on mobility goals if consulted  - Perform Range of Motion 3 times a day  - Reposition patient every 2 hours  - Out of bed for toileting  - Record patient progress and toleration of activity level   Outcome: Progressing     Problem: Nutrition/Hydration-ADULT  Goal: Nutrient/Hydration intake appropriate for improving, restoring or maintaining nutritional needs  Description: Monitor and assess patient's nutrition/hydration status for malnutrition  Collaborate with interdisciplinary team and initiate plan and interventions as ordered  Monitor patient's weight and dietary intake as ordered or per policy  Utilize nutrition screening tool and intervene as necessary  Determine patient's food preferences and provide high-protein, high-caloric foods as appropriate       INTERVENTIONS:  - Monitor oral intake, urinary output, labs, and treatment plans  - Assess nutrition and hydration status and recommend course of action  - Evaluate amount of meals eaten  - Assist patient with eating if necessary   - Allow adequate time for meals  - Recommend/ encourage appropriate diets, oral nutritional supplements, and vitamin/mineral supplements  - Order, calculate, and assess calorie counts as needed  - Recommend, monitor, and adjust tube feedings and TPN/PPN based on assessed needs  - Assess need for intravenous fluids  - Provide specific nutrition/hydration education as appropriate  - Include patient/family/caregiver in decisions related to nutrition  Outcome: Progressing     Problem: METABOLIC, FLUID AND ELECTROLYTES - ADULT  Goal: Electrolytes maintained within normal limits  Description: INTERVENTIONS:  - Monitor labs and assess patient for signs and symptoms of electrolyte imbalances  - Administer electrolyte replacement as ordered  - Monitor response to electrolyte replacements, including repeat lab results as appropriate  - Instruct patient on fluid and nutrition as appropriate  Outcome: Progressing  Goal: Fluid balance maintained  Description: INTERVENTIONS:  - Monitor labs   - Monitor I/O and WT  - Instruct patient on fluid and nutrition as appropriate  - Assess for signs & symptoms of volume excess or deficit  Outcome: Progressing

## 2022-01-21 NOTE — ASSESSMENT & PLAN NOTE
History of essential hypertension, on metoprolol and midodrine   - Midodrine 5mg to be given predialysis  - Monitor BP  - Continue home metoprolol

## 2022-01-21 NOTE — ASSESSMENT & PLAN NOTE
POA, patient complaining of bilateral foot pain, non localized, nontender to palpation, bilateral feet stopping at ankles  Per daughter in law, patient has poor sensation of both feet    - can consider gabapentin as outpatient if needed, will hold off for now

## 2022-01-21 NOTE — ASSESSMENT & PLAN NOTE
Elevated corrected calcium in setting of poor oral intake    Recent Labs     01/19/22  0815 01/19/22  0815 01/20/22  0923 01/21/22  0503   CALCIUM 9 9  --  10 1 9 4   ALB 2 0*   < >  --  2 4*   CORRECTEDCA 11 5*   < >  --  10 7*    < > = values in this interval not displayed       -  Ionized calcium level  - s/p Isolyte 50cc/hr x 10 hours  - nephrology following

## 2022-01-21 NOTE — ASSESSMENT & PLAN NOTE
Lab Results   Component Value Date    HGBA1C 10 3 (H) 12/06/2021       Recent Labs     01/20/22  1113 01/20/22  1153 01/20/22  1629 01/21/22  0734   POCGLU 90 92 133 106       Blood Sugar Average: Last 72 hrs:  (P) 124     - Discontinued home lantus for now, due to hypoglycemia  - Monitor blood glucose, SSI as needed

## 2022-01-21 NOTE — PROGRESS NOTES
NEPHROLOGY PROGRESS NOTE   Willow Holloway 73 y o  female MRN: [id number]  Unit/Bed#: S -01 Encounter: [id number]  Reason for Consult: ESRD      SUMMARY:    Alexandra Rivas is a 73 y o  female medical history of ESRD recently transitioned from PD to hemodialysis currently on a TTS schedule, hypertension, diabetes mellitus, hyperlipidemia, depression, GERD, CVA, cardiomyopathy who was admitted to S LT after presenting with hypotension/diarrhea/urinary complaints   She has had a significant decline in functional status the last few months  ASSESSMENT and PLAN:    End-stage renal disease  --had been on peritoneal dialysis now transition to intermittent hemodialysis  --Sebast[facility] Patton Tuesday Thursday Saturday  --underwent hemodialysis yesterday  --she still has some residual renal function  --access left IJ PermCath, PD catheter also in place  --transition to PD due to patient being get SNF, if there is a shortage of hemodialysis nurses we can transition this patient back to PD if needed, while inpatient, and then transition back to hemodialysis as an outpatient     Anemia of chronic kidney disease  --recent CVA,  not on Epogen     [place] bone disorder-chronic kidney disease  --elevated corrected calcium, ionized calcium 1 38  --not on a phosphate binder  --calcium level improving  --ionized calcium tomorrow  --poor oral intake     Low bicarbonate level--resolved     Hypertension  --does not examine volume overloaded  Developed intra dialytic hypotension  Has decreased oral intake      SUBJECTIVE / INTERVAL HISTORY:    No overnight events    Underwent dialysis yesterday    OBJECTIVE:  Current Weight: Weight - Scale: 50 7 kg (111 lb 12 4 oz)  Vitals:    01/20/22 1544 01/20/22 1726 01/20/22 2135 01/21/22 0637   BP: 117/54  122/57 133/61   BP Location: Right arm  Right arm Right arm   Pulse: (!) 111  101 89   Resp: 18  18 16   Temp: 97 5 °F (36 4 °C)  97 9 °F (36 6 °C) 98 1 °F (36 7 °C)   TempSrc: Oral  Oral Oral SpO2: 90% 97% 95% 96%   Weight:           Intake/Output Summary (Last 24 hours) at 1/21/2022 1129  Last data filed at 1/20/2022 1900  Gross per 24 hour   Intake 540 ml   Output 300 ml   Net 240 ml       Review of Systems:    She is not very interactive    Physical Exam  Vitals and nursing note reviewed  Constitutional:       General: She is not in acute distress  Appearance: Normal appearance  She is obese  She is ill-appearing  She is not diaphoretic  HENT:      Head: Normocephalic and atraumatic  Mouth/Throat:      Mouth: Mucous membranes are dry  Eyes:      General: No scleral icterus  Right eye: No discharge  Left eye: No discharge  Cardiovascular:      Rate and Rhythm: Normal rate and regular rhythm  Pulses: Normal pulses  Pulmonary:      Effort: Pulmonary effort is normal  No respiratory distress  Breath sounds: No wheezing  Abdominal:      General: Abdomen is flat  Bowel sounds are normal  There is no distension  Tenderness: There is no abdominal tenderness  There is no guarding  Musculoskeletal:      Cervical back: Neck supple  Right lower leg: No edema  Left lower leg: No edema  Skin:     Coloration: Skin is not pale  Findings: No erythema, lesion or rash  Neurological:      Mental Status: She is alert           Medications:    Current Facility-Administered Medications:     aspirin (ECOTRIN LOW STRENGTH) EC tablet 81 mg, 81 mg, Oral, Daily, Godwin Boland MD, 81 mg at 01/21/22 0802    atorvastatin (LIPITOR) tablet 10 mg, 10 mg, Oral, Daily, Godwin Boland MD, 10 mg at 01/21/22 0802    b complex-vitamin C-folic acid (NEPHROCAPS) capsule 1 capsule, 1 capsule, Oral, Daily With Jeanette Calderon MD, 1 capsule at 01/20/22 1707    ceftriaxone (ROCEPHIN) 1 g/50 mL in dextrose IVPB, 1,000 mg, Intravenous, Q24H, Godwin Boland MD, Stopped at 01/20/22 1601    clopidogrel (PLAVIX) tablet 75 mg, 75 mg, Oral, Daily, Michelle Galloway MD, 75 mg at 01/21/22 0801    heparin (porcine) subcutaneous injection 5,000 Units, 5,000 Units, Subcutaneous, Q8H [facility] & longterm, 5,000 Units at 01/21/22 0542 **AND** [CANCELED] Platelet count, , , AM Draw, Michelle Galloway MD    insulin lispro (HumaLOG) 100 units/mL subcutaneous injection 1-5 Units, 1-5 Units, Subcutaneous, TID AC, 2 Units at 01/19/22 1239 **AND** Fingerstick Glucose (POCT), , , TID AC, Michelle Galloway MD    latanoprost (XALATAN) 0 005 % ophthalmic solution 1 drop, 1 drop, Both Eyes, HS, Michelle Galloway MD, 1 drop at 01/20/22 2111    metoprolol tartrate (LOPRESSOR) tablet 25 mg, 25 mg, Oral, Daily, Michelle Galloway MD, 25 mg at 01/21/22 0802    midodrine (PROAMATINE) tablet 5 mg, 5 mg, Oral, Before Dialysis, Michelle Galloway MD, 5 mg at 01/20/22 1036    sertraline (ZOLOFT) tablet 25 mg, 25 mg, Oral, Daily, Michelle Galloway MD, 25 mg at 01/21/22 0802    Laboratory Results:  Results from last 7 days   Lab Units 01/21/22  0503 01/20/22  0923 01/19/22  0815 01/18/22  0635 01/17/22  1227   WBC Thousand/uL 4 67 6 29 5 83 6 32 6 77   HEMOGLOBIN g/dL 8 0* 8 9* 9 3* 9 4* 9 6*   HEMATOCRIT % 25 4* 28 1* 29 2* 28 5* 30 5*   PLATELETS Thousands/uL 201 257 249 242 257   POTASSIUM mmol/L 3 6 4 0 4 5 3 2* 3 3*   CHLORIDE mmol/L 105 106 106 106 107   CO2 mmol/L 23 20* 20* 20* 22   BUN mg/dL 36* 77* 73* 68* 58*   CREATININE mg/dL 2 55* 4 47* 3 73* 2 94* 2 75*   CALCIUM mg/dL 9 4 10 1 9 9 9 5 9 6

## 2022-01-22 NOTE — DISCHARGE INSTRUCTIONS
Return to er with chest pain or shortness of breath  See nephrology for follow up and arrangement of your next HD session  Return to er if you can not get HD in 24 hours or you have new symptoms or feel worse at any time

## 2022-01-22 NOTE — ED NOTES
Transport:  [id number]  01/22/22 3842

## 2022-01-22 NOTE — ED PROVIDER NOTES
History  Chief Complaint   Patient presents with    Dialysis Shunt Problem     Sent from [facility] dialysis because dialysis cath is blocked  Patient from [address] home     To er from HD facility as her HD cath is clogged as per report  records indicate she recently failed POD and was placed on HD via [place] in the left subclavian  Pt is wo complaints  denies cp, sob, fever, chills          Prior to Admission Medications   Prescriptions Last Dose Informant Patient Reported? Taking?    Acetaminophen 325 MG CAPS   Yes No   Sig: Take 650 mg by mouth every 6 (six) hours as needed   B Complex-C-Folic Acid (NEPHRO VITAMINS PO)   Yes No   Sig: Take 1 tablet by mouth daily   Nutritional Supplements (Alon) POWD   Yes No   Sig: Take by mouth   Sevelamer Carbonate 0 8 g PACK   Yes No   Sig: Take 1 Package by mouth 3 (three) times a day 1 Packet with meals TID   aspirin (ECOTRIN LOW STRENGTH) 81 mg EC tablet   Yes No   Sig: Take 81 mg by mouth daily   atorvastatin (LIPITOR) 10 mg tablet   No No   Sig: Take 1 tablet (10 mg total) by mouth daily   calcitriol (ROCALTROL) 0 25 mcg capsule   Yes No   Sig: Take 0 25 mcg by mouth daily   Patient not taking: Reported on 1/17/2022    cefdinir (OMNICEF) 300 mg capsule   No No   Sig: Take 1 capsule (300 mg total) by mouth daily at bedtime for 4 days Must be taken after dialysis on dialysis days   cholecalciferol (VITAMIN D3) 1,000 units tablet   Yes No   Sig: Take 2,000 Units by mouth daily   cholestyramine sugar free (QUESTRAN LIGHT) 4 g packet   No No   Sig: Take 1 packet (4 g total) by mouth 2 (two) times a day   clopidogrel (PLAVIX) 75 mg tablet   Yes No   Sig: Take 75 mg by mouth daily   docusate sodium (COLACE) 100 mg capsule   Yes No   Sig: Take 100 mg by mouth 2 (two) times a day   esomeprazole (NexIUM) 40 MG capsule   Yes No   Sig: Take 40 mg by mouth every morning before breakfast   gentamicin (GARAMYCIN) 0 1 % cream   No No   Sig: Apply 1 application topically daily glucagon (GLUCAGEN) 1 mg injection   Yes No   Sig: Inject 1 mg into a muscle once As needed for low blood sugar   insulin glargine (LANTUS) 100 units/mL subcutaneous injection   Yes No   Sig: Inject 5 Units under the skin daily at bedtime   insulin lispro (HumaLOG) 100 units/mL injection   Yes No   Sig: Inject under the skin Sliding scale coverage     latanoprost (XALATAN) 0 005 % ophthalmic solution   Yes No   Sig: Administer 1 drop to both eyes daily at bedtime   metoprolol tartrate (LOPRESSOR) 25 mg tablet   Yes No   Sig: Take 25 mg by mouth daily   midodrine (PROAMATINE) 5 mg tablet   Yes No   Sig: Take 5 mg by mouth 3 (three) times a week 1 tablet Daily on Tue,Thus,Sat prior to HD   nystatin (MYCOSTATIN) cream   Yes No   Sig: Apply 1 application topically daily To buttocks   Patient not taking: Reported on 1/17/2022    ondansetron (ZOFRAN) 4 mg tablet   Yes No   Sig: Take 4 mg by mouth every 6 (six) hours as needed for nausea or vomiting   perphenazine 4 mg tablet   Yes No   Sig: Take 4 mg by mouth 3 (three) times a day With meals   sacubitril-valsartan (ENTRESTO) 24-26 MG TABS   No No   Sig: Take 1 tablet by mouth 2 (two) times a day Hold morning of dialysis days   sertraline (ZOLOFT) 25 mg tablet   Yes No   Sig: Take 25 mg by mouth daily      Facility-Administered Medications: None       Past Medical History:   Diagnosis Date    Anemia due to chronic kidney disease     Atherosclerotic heart disease of native coronary artery without angina pectoris     Cardiac pacemaker     Dependence on renal dialysis ([facility] )     Dysphagia     End stage renal disease (HCC)     GERD (gastroesophageal reflux disease)     Hyperlipidemia     Hypertensive chronic kidney disease with stage 5 chronic kidney disease or end stage renal disease ([facility] )     Hypotension 12/8/2021    Major depressive disorder, recurrent (HCC)     MRSA (methicillin resistant Staphylococcus aureus)     Pressure ulcer of sacral region, unstageable [facility])     Psychiatric disorder     anxiety    Type 2 diabetes mellitus with chronic kidney disease ([facility] )     Type 2 diabetes mellitus with diabetic nephropathy (HCC)     Type 2 diabetes mellitus with diabetic peripheral angiopathy without gangrene ([facility] )     Type 2 diabetes mellitus with hyperglycemia (HCC)     Type 2 diabetes mellitus with hypoglycemia (HCC)     Unspecified Escherichia coli (E  coli) as the cause of diseases classified elsewhere        Past Surgical History:   Procedure Laterality Date    IR TUNNELED CENTRAL LINE CHECK/CHANGE/REPOSITION  1/3/2022    IR TUNNELED DIALYSIS CATHETER PLACEMENT  12/10/2021       History reviewed  No pertinent family history  I have reviewed and agree with the history as documented  E-Cigarette/Vaping     E-Cigarette/Vaping Substances     Social History     Tobacco Use    Smoking status: Unknown If Ever Smoked    Smokeless tobacco: Never Used   Substance Use Topics    Alcohol use: Not Currently    Drug use: Not Currently       Review of Systems   All other systems reviewed and are negative  Physical Exam  Physical Exam  Constitutional:       General: She is not in acute distress  Appearance: Normal appearance  She is well-developed and normal weight  She is not ill-appearing, toxic-appearing or diaphoretic  HENT:      Head: Normocephalic and atraumatic  Right Ear: External ear normal       Left Ear: External ear normal       Nose: Nose normal       Mouth/Throat:      Mouth: Mucous membranes are moist    Eyes:      General:         Right eye: No discharge  Left eye: No discharge  Pupils: Pupils are equal, round, and reactive to light  Neck:      Vascular: No JVD  Cardiovascular:      Rate and Rhythm: Normal rate and regular rhythm  Pulses: Normal pulses  Heart sounds: Normal heart sounds  No murmur heard  No friction rub  No gallop  Pulmonary:      Effort: Pulmonary effort is normal  No respiratory distress  Breath sounds: Normal breath sounds  No stridor  No wheezing, rhonchi or rales  Chest:      Chest wall: No tenderness  Abdominal:      General: Abdomen is flat  Bowel sounds are normal  There is no distension  Palpations: Abdomen is soft  There is no mass  Tenderness: There is no abdominal tenderness  There is no guarding or rebound  Hernia: No hernia is present  Musculoskeletal:         General: No swelling, tenderness, deformity or signs of injury  Normal range of motion  Cervical back: Normal range of motion and neck supple  Right lower leg: No edema  Left lower leg: No edema  Skin:     General: Skin is warm  Capillary Refill: Capillary refill takes less than 2 seconds  Coloration: Skin is not jaundiced or pale  Findings: No bruising, erythema, lesion or rash  Neurological:      General: No focal deficit present  Mental Status: She is alert and oriented to person, place, and time  Mental status is at baseline  Cranial Nerves: No cranial nerve deficit  Sensory: No sensory deficit  Motor: No weakness or abnormal muscle tone        Coordination: Coordination normal       Gait: Gait normal       Deep Tendon Reflexes: Reflexes normal    Psychiatric:         Mood and Affect: Mood normal          Vital Signs  ED Triage Vitals [01/22/22 1312]   Temperature Pulse Respirations Blood Pressure SpO2   97 6 °F (36 4 °C) 76 18 124/65 94 %      Temp Source Heart Rate Source Patient Position - Orthostatic VS BP Location FiO2 (%)   Oral Monitor Lying Right arm --      Pain Score       2           Vitals:    01/22/22 1312 01/22/22 1405   BP: 124/65 104/57   Pulse: 76 80   Patient Position - Orthostatic VS: Lying          Visual Acuity      ED Medications  Medications - No data to display    Diagnostic Studies  Results Reviewed     Procedure Component Value Units Date/Time    Comprehensive metabolic panel [[id number]]  (Abnormal) Collected: 01/22/22 1402 Lab Status: Final result Specimen: Blood from Arm, Right Updated: 01/22/22 1451     Sodium 138 mmol/L      Potassium 3 5 mmol/L      Chloride 105 mmol/L      CO2 23 mmol/L      ANION GAP 10 mmol/L      BUN 56 mg/dL      Creatinine 3 53 mg/dL      Glucose 92 mg/dL      Calcium 9 8 mg/dL      Corrected Calcium 10 8 mg/dL      AST 24 U/L      ALT 25 U/L      Alkaline Phosphatase 101 1 U/L      Total Protein 5 5 g/dL      Albumin 2 8 g/dL      Total Bilirubin 0 31 mg/dL      eGFR 12 ml/min/1 73sq m     Narrative:      National Kidney Disease Foundation guidelines for Chronic Kidney Disease (CKD):     Stage 1 with normal or high GFR (GFR > 90 mL/min/1 73 square meters)    Stage 2 Mild CKD (GFR = 60-89 mL/min/1 73 square meters)    Stage 3A Moderate CKD (GFR = 45-59 mL/min/1 73 square meters)    Stage 3B Moderate CKD (GFR = 30-44 mL/min/1 73 square meters)    Stage 4 Severe CKD (GFR = 15-29 mL/min/1 73 square meters)    Stage 5 End Stage CKD (GFR <15 mL/min/1 73 square meters)  Note: GFR calculation is accurate only with a steady state creatinine    CBC and differential [[id number]]  (Abnormal) Collected: 01/22/22 1402    Lab Status: Final result Specimen: Blood from Arm, Right Updated: 01/22/22 1410     WBC 6 83 Thousand/uL      RBC 3 04 Million/uL      Hemoglobin 9 3 g/dL      Hematocrit 28 1 %      MCV 92 fL      MCH 30 6 pg      MCHC 33 1 g/dL      RDW 16 1 %      MPV 11 0 fL      Platelets 164 Thousands/uL      nRBC 0 /100 WBCs      Neutrophils Relative 55 %      Immat GRANS % 1 %      Lymphocytes Relative 36 %      Monocytes Relative 7 %      Eosinophils Relative 1 %      Basophils Relative 0 %      Neutrophils Absolute 3 81 Thousands/µL      Immature Grans Absolute 0 06 Thousand/uL      Lymphocytes Absolute 2 44 Thousands/µL      Monocytes Absolute 0 46 Thousand/µL      Eosinophils Absolute 0 04 Thousand/µL      Basophils Absolute 0 02 Thousands/µL                  XR chest 2 views   Final Result by Destin Zarate Brett Aguilar MD (01/22 1937)      No acute cardiopulmonary disease  Dialysis catheter in lower SVC  Workstation performed: [id number]                    Procedures  Procedures         ED Course                                             MDM  Number of Diagnoses or Management Options  Diagnosis management comments: To er from hd as her cath is clogged as per report  In er I can easily draw back blood and push flush, the cath appears to be working  Will do basic labs and touch base with nephrology to assure she can get her hd today  I have attempted numerous times to call the [place] hd center but am not able to get a representative  Spoke to dr Hannah Ko of nephrology who know pt well, he is aware of today event and missed hd and possible clogged cath  The cath is working well on my eval  Her cxr does not appear grossly wet, her labs are reassuring from a missed hd standpoint  Dr Hannah Ko will arrange hd from here, states safe to dc home and his team will eval and make further arrangements from er  Pt aware of plan to fu with nephrology and the need for hd as she missed today  I have addressed all red flags, need for fu and when to return to er and she has voiced understanding  Disposition  Final diagnoses:   Complication of arteriovenous dialysis fistula, initial encounter     Time reflects when diagnosis was documented in both MDM as applicable and the Disposition within this note     Time User Action Codes Description Comment    1/23/2022  7:03 AM Nathaniel Magallanes  [id number]] Complication of arteriovenous dialysis fistula, initial encounter       ED Disposition     ED Disposition Condition Date/Time Comment    Discharge Stable Sat Jan 22, 2022  3:12  [address] discharge to home/self care              Follow-up Information     Follow up With Specialties Details Why Contact Info    Carline Jacobo MD Family Medicine, Geriatric Medicine Schedule an appointment as soon as possible for a visit in 3 days  Dimitris Katz [address]  [phone]            Discharge Medication List as of 1/22/2022  3:13 PM      CONTINUE these medications which have NOT CHANGED    Details   Acetaminophen 325 MG CAPS Take 650 mg by mouth every 6 (six) hours as needed, Historical Med      aspirin (ECOTRIN LOW STRENGTH) 81 mg EC tablet Take 81 mg by mouth daily, Historical Med      atorvastatin (LIPITOR) 10 mg tablet Take 1 tablet (10 mg total) by mouth daily, Starting Thu 12/23/2021, Until Sat 1/22/2022, Normal      B Complex-C-Folic Acid (NEPHRO VITAMINS PO) Take 1 tablet by mouth daily, Historical Med      calcitriol (ROCALTROL) 0 25 mcg capsule Take 0 25 mcg by mouth daily, Historical Med      cefdinir (OMNICEF) 300 mg capsule Take 1 capsule (300 mg total) by mouth daily at bedtime for 4 days Must be taken after dialysis on dialysis days, Starting Fri 1/21/2022, Until Tue 1/25/2022, Normal      cholecalciferol (VITAMIN D3) 1,000 units tablet Take 2,000 Units by mouth daily, Historical Med      cholestyramine sugar free (QUESTRAN LIGHT) 4 g packet Take 1 packet (4 g total) by mouth 2 (two) times a day, Starting Wed 12/22/2021, Until Fri 1/21/2022, Normal      clopidogrel (PLAVIX) 75 mg tablet Take 75 mg by mouth daily, Historical Med      docusate sodium (COLACE) 100 mg capsule Take 100 mg by mouth 2 (two) times a day, Historical Med      esomeprazole (NexIUM) 40 MG capsule Take 40 mg by mouth every morning before breakfast, Historical Med      gentamicin (GARAMYCIN) 0 1 % cream Apply 1 application topically daily, Starting Thu 12/23/2021, Normal      glucagon (GLUCAGEN) 1 mg injection Inject 1 mg into a muscle once As needed for low blood sugar, Historical Med      insulin glargine (LANTUS) 100 units/mL subcutaneous injection Inject 5 Units under the skin daily at bedtime, Historical Med      insulin lispro (HumaLOG) 100 units/mL injection Inject under the skin Sliding scale coverage , Historical Med      latanoprost (XALATAN) 0 005 % ophthalmic solution Administer 1 drop to both eyes daily at bedtime, Historical Med      metoprolol tartrate (LOPRESSOR) 25 mg tablet Take 25 mg by mouth daily, Historical Med      midodrine (PROAMATINE) 5 mg tablet Take 5 mg by mouth 3 (three) times a week 1 tablet Daily on Tue,Thus,Sat prior to HD, Historical Med      Nutritional Supplements (Alon) POWD Take by mouth, Historical Med      nystatin (MYCOSTATIN) cream Apply 1 application topically daily To buttocks, Historical Med      ondansetron (ZOFRAN) 4 mg tablet Take 4 mg by mouth every 6 (six) hours as needed for nausea or vomiting, Historical Med      perphenazine 4 mg tablet Take 4 mg by mouth 3 (three) times a day With meals, Historical Med      sacubitril-valsartan (ENTRESTO) 24-26 MG TABS Take 1 tablet by mouth 2 (two) times a day Hold morning of dialysis days, Starting Wed 12/22/2021, Until Fri 1/21/2022, Normal      sertraline (ZOLOFT) 25 mg tablet Take 25 mg by mouth daily, Historical Med      Sevelamer Carbonate 0 8 g PACK Take 1 Package by mouth 3 (three) times a day 1 Packet with meals TID, Historical Med             No discharge procedures on file      PDMP Review     None          ED Provider  Electronically Signed by           Radha Santos MD  01/23/22 6510

## 2022-01-22 NOTE — ED NOTES
Attempted iv/labs without success, LETTY Vogel to attempt with ultrasound machine     Filemon Anderson RN  01/22/22 9335

## 2022-01-22 NOTE — ED NOTES
Contacted [facility] for ride back to SNF facility, states they will call back once one is available       Ene Langford RN  01/22/22 6942

## 2022-01-26 PROBLEM — T14.8XXA DEEP TISSUE INJURY: Status: ACTIVE | Noted: 2022-01-01

## 2022-01-26 PROBLEM — L89.522 PRESSURE INJURY OF LEFT ANKLE, STAGE 2 (HCC): Status: ACTIVE | Noted: 2022-01-01

## 2022-01-26 NOTE — ASSESSMENT & PLAN NOTE
Left medial ankle  - wound size is 0 5 x 1 x 0 1 cm  , fluid filled blister, no obvious sign of infection  - local wound care with hydraguard  - offload, use heel boots  - followup next week

## 2022-01-26 NOTE — ASSESSMENT & PLAN NOTE
Several location with DTI  All wounds are purple, non blachable  - left bunion medial - 2 x 2 x 0 1 cm  , no obvious sign of infection  - left great toe - 2 x 0 6 x 0 1 cm, no obvious sign of infection  - right foot 4th toe - 0 1 x 0 1 x 0 1 cm  , with no obvious sign of infection  - right hip - 5 5 x 6 5 x 0 1 cm  , with no obvious sign of infection  - right lateral foot - 0 5 x 0 7 x 0 1 cm  , with no obvious sign of infection  - left hip - 4 x 1 x 0 1 cm  , with no obvious sign of infection    Plan :  Local wound care with hydraguard  Offload with special mattress  Use heel boots at all times when in bed  Increased protein intake  Continue to monitor for any worsening  If the wound on bilateral lower extremity worsened, we will order arterial duplex  Followup next week

## 2022-01-26 NOTE — LETTER
Patient:  Araceli Berg   1950           SANTINO Benítez saw Araceli Berg for a wound care visit on 1/26/2022  See below for information relating to this visit  Chief Complaint   Patient presents with    New Patient Visit     multiple wounds        Assessment/Plan:  1  Press[facility]  Assessment & Plan:  Sacrum  - Wound size increase, 1 2 x 1 x 0 1 cm  Compared to last visit measurement of  1 x 1 x 0 1 cm , 100% slough, no obvious sign of infection  - local wound care  - changed to medihoney for autolytic debridement  - offload  - follow up next week    Orders:  -     Wound cleansing and dressings; Future    2  Deep tissue injury  Assessment & Plan:  Several location with DTI  All wounds are purple, non blachable  - left bunion medial - 2 x 2 x 0 1 cm  , no obvious sign of infection  - left great toe - 2 x 0 6 x 0 1 cm, no obvious sign of infection  - right foot 4th toe - 0 1 x 0 1 x 0 1 cm  , with no obvious sign of infection  - right hip - 5 5 x 6 5 x 0 1 cm  , with no obvious sign of infection  - right lateral foot - 0 5 x 0 7 x 0 1 cm  , with no obvious sign of infection  - left hip - 4 x 1 x 0 1 cm  , with no obvious sign of infection    Plan :  Local wound care with hydraguard  Offload with special mattress  Use heel boots at all times when in bed  Increased protein intake  Continue to monitor for any worsening  If the wound on bilateral lower extremity worsened, we will order arterial duplex  Followup next week    Orders:  -     Wound cleansing and dressings; Future    3  Pressure injury of left ankle, stage 2 (HCC)  Assessment & Plan:  Left medial ankle  - wound size is 0 5 x 1 x 0 1 cm  , fluid filled blister, no obvious sign of infection  - local wound care with hydraguard  - offload, use heel boots  - followup next week    Orders:  -     Wound cleansing and dressings; Future    4   Ambulatory dysfunction  Assessment & Plan:  On STR Progress Notes by Era Mcmahon RN at 4/11/2019  4:51 PM     Author: Era Mcmahon RN Service: -- Author Type: Registered Nurse    Filed: 4/12/2019  8:43 AM Encounter Date: 4/11/2019 Status: Signed    : Era Mcmahon RN (Registered Nurse)       Steve Leyva MD Holen, Megan L, RN             Please hold metoprolol and diltiazem the day prior to this procedure.  dd              Orders:  Austin Wyatt  1950  Orders Placed This Encounter   Procedures    Wound cleansing and dressings     Wound:  Sacrum  Discontinue previous wound order  Cleanse the wound bed with NSS   Apply non-sting skin prep to periwound area  Apply medihoney to wound bed, then cover with bordered foam   Frequency : daily and prn for soiling    Left medial ankle, left bunion medial, left great toe, right foot 4th toe, right hip,   Left lateral foot, left hip  Cleanse the wound bed with NSS  Apply hydraguard to wound bed  Daily and prn for soiling    Prevalon boots when in bed  Air mattress  Offload all wounds  Turn and reposition frequently, maximum of every two hours  Instruct / Assist with weight shifting every 15 - 20 minutes when in chair  Increase protein intake  Monitor for any sign of infection or worsening, inform PCP or patient's primary physician in your facility  Standing Status:   Future     Standing Expiration Date:   1/26/2023         Follow Up:  Return in about 1 week (around 2/2/2022)  [address] hours are 8:00 am - 4:30 pm Monday through Friday  The center phone number is [phone]  You can also contact me directly thru my email at [facility]  [email or web address] or thru tiger text  If it is an emergency, please contact the PCP or patient's attending physician in your facility       Sincerely,    Electronically signed by SANTINO Chao    Patient : Austin Wyatt    1950

## 2022-01-26 NOTE — ASSESSMENT & PLAN NOTE
Sacrum  - Wound size increase, 1 2 x 1 x 0 1 cm    Compared to last visit measurement of  1 x 1 x 0 1 cm , 100% slough, no obvious sign of infection  - local wound care  - changed to medihoney for autolytic debridement  - offload  - follow up next week

## 2022-01-28 NOTE — PROGRESS NOTES
[address] MANAGEMENT   AND [facility]PER[facility]       Patient ID: Polo Escalante is a 73 y o  female Date of Birth 1950     Location of Service: [address] [place] [address]    Performed wound round with: Wound team       Chief Complaint   Patient presents with   [address] [facility] [address] Patient Visit     multiple wounds       Wound Instructions:  Orders Placed This Encounter   Procedures    Wound cleansing and dressings     Wound:  Sacrum  Discontinue previous wound order  Cleanse the wound bed with NSS   Apply non-sting skin prep to periwound area  Apply medihoney to wound bed, then cover with bordered foam   Frequency : daily and prn for soiling    Left medial ankle, left bunion medial, left great toe, right foot 4th toe, right hip,   Left lateral foot, left hip  Cleanse the wound bed with NSS  Apply hydraguard to wound bed  Daily and prn for soiling    Prevalon boots when in bed  Air mattress  Offload all wounds  Turn and reposition frequently, maximum of every two hours  Instruct / Assist with weight shifting every 15 - 20 minutes when in chair  Increase protein intake  Monitor for any sign of infection or worsening, inform PCP or patient's primary physician in your facility  Standing Status:   Future     Standing Expiration Date:   1/26/2023       Allergies  Penicillins      Assessment & Plan:  1  Pressure injury of sacral region, unstageable [facility])  Assessment & Plan:  Sacrum  - Wound size increase, 1 2 x 1 x 0 1 cm  Compared to last visit measurement of  1 x 1 x 0 1 cm , 100% slough, no obvious sign of infection  - local wound care  - changed to medihoney for autolytic debridement  - offload  - follow up next week    Orders:  -     Wound cleansing and dressings; Future    2   Deep tissue injury  Assessment & Plan:  Several location with DTI  All wounds are purple, non blachable  - left bunion medial - 2 x 2 x 0 1 cm  , no obvious sign of infection  - left great toe - 2 x 0 6 x 0 1 cm, no obvious sign of infection  - right foot 4th toe - 0 1 x 0 1 x 0 1 cm  , with no obvious sign of infection  - right hip - 5 5 x 6 5 x 0 1 cm  , with no obvious sign of infection  - right lateral foot - 0 5 x 0 7 x 0 1 cm  , with no obvious sign of infection  - left hip - 4 x 1 x 0 1 cm  , with no obvious sign of infection    Plan :  Local wound care with hydraguard  Offload with special mattress  Use heel boots at all times when in bed  Increased protein intake  Continue to monitor for any worsening  If the wound on bilateral lower extremity worsened, we will order arterial duplex  Followup next week    Orders:  -     Wound cleansing and dressings; Future    3  Pressure injury of left ankle, stage 2 (HCC)  Assessment & Plan:  Left medial ankle  - wound size is 0 5 x 1 x 0 1 cm  , fluid filled blister, no obvious sign of infection  - local wound care with hydraguard  - offload, use heel boots  - followup next week    Orders:  -     Wound cleansing and dressings; Future    4  Ambulatory dysfunction  Assessment & Plan:  On STR             Subjective: Follow up  for wounds on the right buttock, left buttock, and sacrum  New consult for wounds on BLE  Patient have complex medical history including but not limited to  ESRD of PD with recent transition to HD, CVA, Pacemaker, DM II, HTN, Depression, Ambulatory dysfunction  Patient was referred by Senior care team     As per report, the patient was recently admitted in acute care on January 17 2022 due to UTI and returned to facility on January 22, 2022  In the patient returned from the acute care, the patient have multiple wounds on the bilateral lower extremity  Possible etiology is pressure ulcers  No other significant issues related to the wound  Review of Systems   Constitutional: Negative  HENT: Negative  Eyes: Negative  Respiratory: Negative  Cardiovascular: Negative for leg swelling  Gastrointestinal: Negative  Endocrine: Negative      Genitourinary: Negative  Musculoskeletal: Positive for gait problem  Skin: Positive for wound  See hPI   Neurological: Negative for dizziness and headaches  Psychiatric/Behavioral: Negative for behavioral problems  Objective: There were no vitals taken for this visit  Physical Exam  Constitutional:       Appearance: She is obese  HENT:      Head: Normocephalic  Nose: Nose normal       Mouth/Throat:      Mouth: Mucous membranes are moist    Eyes:      Conjunctiva/sclera: Conjunctivae normal    Cardiovascular:      Rate and Rhythm: Normal rate  Pulmonary:      Effort: Pulmonary effort is normal    Abdominal:      Palpations: Abdomen is soft  Tenderness: There is no abdominal tenderness  There is no guarding  Genitourinary:     Comments: With incontinent episode  Musculoskeletal:         General: No tenderness  Cervical back: Normal range of motion  Right lower leg: No edema  Left lower leg: No edema  Comments: LROM   Skin:     General: Skin is warm  Findings: Lesion present        Comments: Left medial ankle - 0 5 x 1 x 0 1 cm  , fluid filled blister, no obvious sign of infection, denies pain, periwound is normal no drainage    Left bunion, medial - 2 x 2 x 0 1 cm , purple, non- blanchable, no obvious sign of infection, no drainage    Sacrum - 1 2 x 1 x 0 1 cm  , 100% slough, no obvious sign of infection, denies pain, no malodor, periwound is normal, edge is attached to wound bed, no drainage    Left great toe - 2 x 0 6 x 0 1 cm , red, non-blachable, no obvious sign of infection    Right 4th toe - 0 1 x 0 1 x 0 1 cm , purple nonblanchable  no obvious sign of infection    Right hip -5 5  x 6 5 x 0 1 cm , purple nonblanchable, no obvious sign of infection, periwound is normal    Left lateral foot -0 5  x 0 7 x 0 1 cm , purple nonblanchable, no obvious sign of infection, periwound is normal    Left hip -4 x 1 x 0 1 cm , purple nonblanchable, no obvious sign of infection, periwound is normal       Neurological:      Mental Status: She is alert and oriented to person, place, and time  Psychiatric:         Mood and Affect: Mood normal          Thought Content: Thought content normal               Procedures           Patient's care was coordinated with nursing facility staff  Recent vitals, labs and updated medications were reviewed on EMR or chart system of facility   Past Medical, surgical, social, medication and allergy history and patient's previous records were reviewed and updated as appropriate:     Patient Active Problem List   Diagnosis    HHNC (hyperglycemic hyperosmolar nonketotic coma) ([facility] )    ESRD (end stage renal disease) on dialysis ([facility] )    Type 2 diabetes mellitus ([facility] )    Depression    GERD (gastroesophageal reflux disease)    HLD (hyperlipidemia)    Pacemaker    HTN (hypertension)    Hypernatremia    Encephalopathy    Hypotension    CVA (cerebral vascular accident) ([facility] )    CAD (coronary artery disease)    Ischemic cardiomyopathy    Stroke-like symptoms    Abnormal CT of the chest    Ambulatory dysfunction    Dysphagia    [facility] left [facility]ock[facility]    Pressure injury of right buttock, stage 3 (HCC)    Pressure injury of sacral region, unstageable ([facility] )    UTI (urinary tract infection)    Elevated troponin    Diarrhea    Neuropathy of both feet    Hypercalcemia    Deep tissue injury    [facility] left [facility] stage 2 [facility] )     Past Medical History:   Diagnosis Date    Anemia due to chronic kidney disease     Atherosclerotic heart disease of native coronary artery without angina pectoris     Cardiac pacemaker     Dependence on renal dialysis ([facility] )     Dysphagia     End stage renal disease (HCC)     GERD (gastroesophageal reflux disease)     Hyperlipidemia     Hypertensive chronic kidney disease with stage 5 chronic kidney disease or end stage renal disease ([facility] )     Hypotension 12/8/2021    Major depressive disorder, recurrent (HCC)     MRSA (methicillin resistant Staphylococcus aureus)     Pressure ulcer of sacral region, unstageable ([place] [address] )     Psychiatric disorder     anxiety    Type 2 diabetes mellitus with chronic kidney disease ([place] [address] )     Type 2 diabetes mellitus with diabetic nephropathy (HCC)     Type 2 diabetes mellitus with diabetic peripheral angiopathy without gangrene ([place] [address] )     Type 2 diabetes mellitus with hyperglycemia (HCC)     Type 2 [facility] mellitus [facility] hypoglycemia ([facility]     Unspecified Escherichia coli (E  coli) as the cause of diseases classified elsewhere      Past Surgical History:   Procedure Laterality Date    IR TUNNELED CENTRAL LINE CHECK/CHANGE/REPOSITION  1/3/2022    IR TUNNELED DIALYSIS CATHETER PLACEMENT  12/10/2021     Social History     Socioeconomic History    Marital status:      Spouse name: None    Number of children: None    Years of education: None    Highest education level: None   Occupational History    None   Tobacco Use    Smoking status: Unknown If Ever Smoked    Smokeless tobacco: Never Used   Substance and Sexual Activity    Alcohol use: Not Currently    Drug use: Not Currently    Sexual activity: Not Currently   Other Topics Concern    None   Social History Narrative    None     Social Determinants of Health     Financial Resource Strain: Not on file   Food Insecurity: No Food Insecurity    Worried About Running Out of Food in the Last Year: Never true    Kiran of Food in the Last Year: Never true   Transportation Needs: No Transportation Needs    Lack of Transportation (Medical): No    Lack of Transportation (Non-Medical):  No   Physical Activity: Not on file   Stress: Not on file   Social Connections: Not on file   Intimate Partner Violence: Not on file   Housing Stability: Unknown    Unable to Pay for Housing in the Last Year: No    Number of Places Lived in the Last Year: Not on file    Unstable Housing in the Last Year: No        Current Outpatient Medications:     Acetaminophen 325 MG CAPS, Take 650 mg by mouth every 6 (six) hours as needed, Disp: , Rfl:     aspirin (ECOTRIN LOW STRENGTH) 81 mg EC tablet, Take 81 mg by mouth daily, Disp: , Rfl:     atorvastatin (LIPITOR) 10 mg tablet, Take 1 tablet (10 mg total) by mouth daily, Disp: 30 tablet, Rfl: 0    B Complex-C-Folic Acid (NEPHRO VITAMINS PO), Take 1 tablet by mouth daily, Disp: , Rfl:     calcitriol (ROCALTROL) 0 25 mcg capsule, Take 0 25 mcg by mouth daily (Patient not taking: Reported on 1/17/2022 ), Disp: , Rfl:     cholecalciferol (VITAMIN D3) 1,000 units tablet, Take 2,000 Units by mouth daily, Disp: , Rfl:     cholestyramine sugar free (QUESTRAN LIGHT) 4 g packet, Take 1 packet (4 g total) by mouth 2 (two) times a day, Disp: 60 packet, Rfl: 0    clopidogrel (PLAVIX) 75 mg tablet, Take 75 mg by mouth daily, Disp: , Rfl:     docusate sodium (COLACE) 100 mg capsule, Take 100 mg by mouth 2 (two) times a day, Disp: , Rfl:     esomeprazole (NexIUM) 40 MG capsule, Take 40 mg by mouth every morning before breakfast, Disp: , Rfl:     gentamicin (GARAMYCIN) 0 1 % cream, Apply 1 application topically daily, Disp: 15 g, Rfl: 0    glucagon (GLUCAGEN) 1 mg injection, Inject 1 mg into a muscle once As needed for low blood sugar, Disp: , Rfl:     insulin glargine (LANTUS) 100 units/mL subcutaneous injection, Inject 5 Units under the skin daily at bedtime, Disp: , Rfl:     insulin lispro (HumaLOG) 100 units/mL injection, Inject under the skin Sliding scale coverage  , Disp: , Rfl:     latanoprost (XALATAN) 0 005 % ophthalmic solution, Administer 1 drop to both eyes daily at bedtime, Disp: , Rfl:     metoprolol tartrate (LOPRESSOR) 25 mg tablet, Take 25 mg by mouth daily, Disp: , Rfl:     midodrine (PROAMATINE) 5 mg tablet, Take 5 mg by mouth 3 (three) times a week 1 tablet Daily on Tue,Thus,Sat prior to HD, Disp: , Rfl:     Nutritional Supplements (Alon) POWD, Take by mouth, Disp: , Rfl:     nystatin (MYCOSTATIN) cream, Apply 1 application topically daily To buttocks (Patient not taking: Reported on 1/17/2022 ), Disp: , Rfl:     ondansetron (ZOFRAN) 4 mg tablet, Take 4 mg by mouth every 6 (six) hours as needed for nausea or vomiting, Disp: , Rfl:     perphenazine 4 mg tablet, Take 4 mg by mouth 3 (three) times a day With meals, Disp: , Rfl:     sacubitril-valsartan (ENTRESTO) 24-26 MG TABS, Take 1 tablet by mouth 2 (two) times a day Hold morning of dialysis days, Disp: 60 tablet, Rfl: 0    sertraline (ZOLOFT) 25 mg tablet, Take 25 mg by mouth daily, Disp: , Rfl:     Sevelamer Carbonate 0 8 g PACK, Take 1 Package by mouth 3 (three) times a day 1 Packet with meals TID, Disp: , Rfl:   History reviewed  No pertinent family history  Coordination of Care: Wound team aware of the treatment plan    Patient / Staff education :  Staff was given education on sign of infection and pressure ulcer prevention  Staff verbalized understanding     Follow up :  Return in about 1 week (around 2/2/2022)  Voice-recognition software may have been used in the preparation of this document  Occasional wrong word or "sound-alike" substitutions may have occurred due to the inherent limitations of voice recognition software  Interpretation should be guided by context        SANTINO Millard

## 2022-01-31 NOTE — H&P
Interventional Radiology  History and Physical 1/31/2022     Omar Brian   1950   [id number]    Assessment/Plan:  79-year-old female with ESRD dialyzing through a left IJV tunneled hemodialysis catheter returns due to malfunctioning catheter  Problem List Items Addressed This Visit        Genitourinary    ESRD (end stage renal disease) on dialysis ([facility] )    Relevant Orders    IR tunneled central line check/change/reposition             Subjective:     Patient ID: Omar Brian is a 73 y o  female  History of Present Illness  Patient with ESRD dialyzing through a left IJV tunneled hemodialysis catheter returns due to malfunctioning catheter      Review of Systems      Past Medical History:   Diagnosis Date    Anemia due to chronic kidney disease     Atherosclerotic heart disease of native coronary artery without angina pectoris     Cardiac pacemaker     D[facility] )     Dysphagia     End stage renal disease (HCC)     GERD (gastroesophageal reflux disease)     Hyperlipidemia     Hypertensive chronic kidney disease with stage 5 chronic kidney disease or end stage renal disease (HCC)     Hypotension 12/8/2021    Major depressive disorder, recurrent ([facility])     MRSA (methicillin resistant Staphylococcus aureus)     [facility] ulcer [facility] region, [facility]able [facility] )     Psychiatric disorder     anxiety    Type 2 diabetes mellitus with chronic kidney disease ([facility] )     Type 2 diabetes mellitus with diabetic nephropathy (HCC)     Type 2 diabetes mellitus with diabetic peripheral angiopathy without gangrene ([facility] )     Type 2 diabetes mellitus with hyperglycemia (HCC)     Type 2 diabetes mellitus with hypoglycemia ([facility])     Unspecified Escherichia coli (E  coli) as the cause of diseases classified elsewhere         Past Surgical History:   Procedure Laterality Date    IR TUNNELED CENTRAL LINE CHECK/CHANGE/REPOSITION  1/3/2022    IR TUNNELED DIALYSIS CATHETER PLACEMENT 12/10/2021        Social History     Tobacco Use   Smoking Status Unknown If Ever Smoked   Smokeless Tobacco Never Used        Social History     Substance and Sexual Activity   Alcohol Use Not Currently        Social History     Substance and Sexual Activity   Drug Use Not Currently        Allergies   Allergen Reactions    Penicillins Other (See Comments)     Unknown         Current Outpatient Medications   Medication Sig Dispense Refill    Acetaminophen 325 MG CAPS Take 650 mg by mouth every 6 (six) hours as needed      aspirin (ECOTRIN LOW STRENGTH) 81 mg EC tablet Take 81 mg by mouth daily      atorvastatin (LIPITOR) 10 mg tablet Take 1 tablet (10 mg total) by mouth daily 30 tablet 0    B Complex-C-Folic Acid (NEPHRO VITAMINS PO) Take 1 tablet by mouth daily      calcitriol (ROCALTROL) 0 25 mcg capsule Take 0 25 mcg by mouth daily (Patient not taking: Reported on 1/17/2022 )      cholecalciferol (VITAMIN D3) 1,000 units tablet Take 2,000 Units by mouth daily      cholestyramine sugar free (QUESTRAN LIGHT) 4 g packet Take 1 packet (4 g total) by mouth 2 (two) times a day 60 packet 0    clopidogrel (PLAVIX) 75 mg tablet Take 75 mg by mouth daily      docusate sodium (COLACE) 100 mg capsule Take 100 mg by mouth 2 (two) times a day      esomeprazole (NexIUM) 40 MG capsule Take 40 mg by mouth every morning before breakfast      gentamicin (GARAMYCIN) 0 1 % cream Apply 1 application topically daily 15 g 0    glucagon (GLUCAGEN) 1 mg injection Inject 1 mg into a muscle once As needed for low blood sugar      insulin glargine (LANTUS) 100 units/mL subcutaneous injection Inject 5 Units under the skin daily at bedtime      insulin lispro (HumaLOG) 100 units/mL injection Inject under the skin Sliding scale coverage        latanoprost (XALATAN) 0 005 % ophthalmic solution Administer 1 drop to both eyes daily at bedtime      metoprolol tartrate (LOPRESSOR) 25 mg tablet Take 25 mg by mouth daily      midodrine (PROAMATINE) 5 mg tablet Take 5 mg by mouth 3 (three) times a week 1 tablet Daily on Tue,Thus,Sat prior to HD      Nutritional Supplements (Alon) POWD Take by mouth      nystatin (MYCOSTATIN) cream Apply 1 application topically daily To buttocks (Patient not taking: Reported on 1/17/2022 )      ondansetron (ZOFRAN) 4 mg tablet Take 4 mg by mouth every 6 (six) hours as needed for nausea or vomiting      perphenazine 4 mg tablet Take 4 mg by mouth 3 (three) times a day With meals      sacubitril-valsartan (ENTRESTO) 24-26 MG TABS Take 1 tablet by mouth 2 (two) times a day Hold morning of dialysis days 60 tablet 0    sertraline (ZOLOFT) 25 mg tablet Take 25 mg by mouth daily      Sevelamer Carbonate 0 8 g PACK Take 1 Package by mouth 3 (three) times a day 1 Packet with meals TID       No current facility-administered medications for this encounter  Objective: There were no vitals filed for this visit  Physical Exam  Constitutional:       Appearance: Normal appearance  Pulmonary:      Effort: Pulmonary effort is normal    Skin:     Comments: Left IJV tunneled HD catheter in place           No results found for: BNP   Lab Results   Component Value Date    WBC 6 83 01/22/2022    HGB 9 3 (L) 01/22/2022    HCT 28 1 (L) 01/22/2022    MCV 92 01/22/2022     01/22/2022     Lab Results   Component Value Date    INR 0 99 12/17/2021    INR 0 93 12/10/2021    INR 1 11 12/03/2021    PROTIME 13 1 12/17/2021    PROTIME 12 5 12/10/2021    PROTIME 14 2 12/03/2021     Lab Results   Component Value Date    PTT 28 12/17/2021         I have personally reviewed pertinent imaging and laboratory results  Code Status: Prior  Advance Directive and Living Will:      Power of :    POLST:      This text is generated with voice recognition software  There may be translation, syntax,  or grammatical errors  If you have any questions, please contact the dictating provider

## 2022-01-31 NOTE — BRIEF OP NOTE (RAD/CATH)
INTERVENTIONAL RADIOLOGY PROCEDURE NOTE    Date: 1/31/2022    Procedure: IR TUNNELED CENTRAL LINE CHECK/CHANGE/REPOSITION    Preoperative diagnosis:   1  ESRD (end stage renal disease) on dialysis [facility])         Postoperative diagnosis: Same  Surgeon: Aurora Garcia MD     Assistant: None  No qualified resident was available  Blood loss: 1 mL    Specimens: None     Findings: Successful left IJV tunneled HD catheter exchange  Complications: None immediate      Anesthesia: local

## 2022-01-31 NOTE — DISCHARGE INSTRUCTIONS
Perma-cath Placement   WHAT YOU NEED TO KNOW:   A perma-cath is a catheter placed through a vein into or near your right atrium  Your right atrium is the right upper chamber of your heart  A perma-cath is used for dialysis in an emergency or until a long-term device is ready to use  After your procedure, you will have some pain and swelling on your chest and neck  You may have some bruises on your chest and neck  You may also have 2 dressings, one on your chest and one on your neck  DISCHARGE INSTRUCTIONS:   Call 911 for any of the following:   · You feel lightheaded, short of breath, and have chest pain  · Your catheter comes out   Contact Interventional Radiology at [phone] Vivian PATIENTS: Contact Interventional Radiology at [phone]) Saúl Kirby PATIENTS: Contact Interventional Radiology at [phone]) if:  · Blood soaks through your bandage  · You have new swelling in your arm, neck, face, or chest on your right side  · Your catheter gets wet  · Your bruises or pain get worse  · You have a fever or chills  · Persistent nausea or vomiting  · Your incision is red, swollen, or draining pus  · You have questions or concerns about your condition or care  Self-care:       · Resume your normal diet  · Keep your dressings dry  Do not take a shower or swim  You may take a tub bath, but do not get your dressings wet  Water in your wound can cause bacteria to grow and cause an infection  If your dressing gets wet, dry it off and cover it with dry sterile gauze  Call your healthcare provider  Do not use soaps or ointments  · Do not change your dressings  Your healthcare provider or dialysis nurse will change your dressings  Your dressings should stay in place until your healthcare provider removes them  The dressing on your chest will stay as long as you have the catheter in place  The dressing prevents infection  · Do not remove the red and blue caps from the end of your catheter   The caps prevent air from getting into your catheter    Follow up with your healthcare provider as directed: Write down your questions so you remember to ask them during your visits

## 2022-02-02 PROBLEM — L89.210 UNSTAGEABLE PRESSURE ULCER OF RIGHT HIP (HCC): Status: ACTIVE | Noted: 2022-01-01

## 2022-02-02 PROBLEM — L89.323 PRESSURE INJURY OF LEFT BUTTOCK, STAGE 3 (HCC): Status: RESOLVED | Noted: 2022-01-01 | Resolved: 2022-01-01

## 2022-02-02 NOTE — ASSESSMENT & PLAN NOTE
Right hip  - wound size is 2 x 1 2 x 0 1 cm , 100% slough, small amount serous drainage, no obvious sign of infection  - local wound care with medihoney  - offload

## 2022-02-02 NOTE — LETTER
Patient:  Andrew Mac   1950           SANTINO Millard saw Andrew Mac for a wound care visit on 2/2/2022  See below for information relating to this visit  Chief Complaint   Patient presents with    Follow Up Wound Care Visit     Left in her ankle, right hip, left foot, right 4th toe, left hip, left foot, left ankle, left foot, sacrum, left great toe        Assessment/Plan:  1  Pressure injury of sacral region, unstageable [facility])  Assessment & Plan:  Sacrum  - Wound size  decrease, 1 x 0 6 x 0 1 cm , compared to last week measurement of  1 2 x 1 x 0 1 cm   100% slough, no obvious sign of infection  - local wound care  - changed to medihoney for autolytic debridement  - offload  - follow up next week    Orders:  -     Wound cleansing and dressings; Future    2  Deep tissue injury  Assessment & Plan:  Several location with DTI  All wounds are purple, non blachable  - right 4th toe - healed  - left medial ankle - 2 x 2 x 0 1 cm  ,  - left bunion - 2 x 1 6 x 0 1 cm  ,  - left great toe - 0 7 x 0 5 x 0 1 cm  ,  - left hip - healed  - left lateral foot - 1 x 1 5 x 0 1 cm  - all wounds does not have sign of infection, stable     Plan :  Local wound care with hydraguard  Offload with special mattress  Use heel boots at all times when in bed  Increased protein intake  Continue to monitor for any worsening  If the wound on bilateral lower extremity worsened, we will order arterial duplex  Followup next week    Orders:  -     Wound cleansing and dressings; Future    3  Pressure injury of left ankle, stage 2 (HCC)  Assessment & Plan:  Left medial ankle  - wound size  increases, 2 x 2 x 0 1 cm  , compared to last week measurement of  0 5 x 1 x 0 1 cm  ,  no obvious sign of infection  - local wound care with hydraguard  - offload, use heel boots  - followup next week    Orders:  -     Wound cleansing and dressings; Future    4   Ambulatory dysfunction  Assessment & Plan:  On STR    Orders:  -     Wound cleansing and dressings; Future    5  Pressure injury of right buttock, stage 3 (HCC)  -     Wound cleansing and dressings; Future    6  Unstageable pressure ulcer of right hip (HCC)  Assessment & Plan:  Right hip  - wound size is 2 x 1 2 x 0 1 cm , 100% slough, small amount serous drainage, no obvious sign of infection  - local wound care with medihoney  - offload    Orders:  -     Wound cleansing and dressings; Future           Orders:  Jarome Mention  1950  Orders Placed This Encounter   Procedures    Wound cleansing and dressings     Wound:  Sacrum/ right hip  Discontinue previous wound order  Cleanse the wound bed with NSS   Apply non-sting skin prep to periwound area  Apply medihoney to wound bed, then cover with bordered foam   Frequency : daily and prn for soiling     Left medial ankle, left bunion medial, left great toe, right foot 4th toe,   Left lateral foot, left hip  Cleanse the wound bed with NSS  Apply hydraguard to wound bed  Daily and prn for soiling     Prevalon boots when in bed  Air mattress  Offload all wounds  Turn and reposition frequently, maximum of every two hours  Instruct / Assist with weight shifting every 15 - 20 minutes when in chair  Increase protein intake  Monitor for any sign of infection or worsening, inform PCP or patient's primary physician in your facility     Standing Status:   Future     Standing Expiration Date:   2/2/2023         Follow Up:  Return in about 1 week (around 2/9/2022)  [facility] hours are 8:00 am - 4:30 pm Monday through Friday  The center phone number is [phone]  You can also contact me directly thru my email at [facility]  [email or web address] or thru tiger text  If it is an emergency, please contact the PCP or patient's attending physician in your facility       Sincerely,    Electronically signed by SANTINO Tilley    Patient : Jarome Mention    1950

## 2022-02-02 NOTE — ASSESSMENT & PLAN NOTE
Left medial ankle  - wound size  increases, 2 x 2 x 0 1 cm  , compared to last week measurement of  0 5 x 1 x 0 1 cm  ,  no obvious sign of infection  - local wound care with hydraguard  - offload, use heel boots  - followup next week

## 2022-02-02 NOTE — ASSESSMENT & PLAN NOTE
Several location with DTI  All wounds are purple, non blachable  - right 4th toe - healed  - left medial ankle - 2 x 2 x 0 1 cm  ,  - left bunion - 2 x 1 6 x 0 1 cm  ,  - left great toe - 0 7 x 0 5 x 0 1 cm  ,  - left hip - healed  - left lateral foot - 1 x 1 5 x 0 1 cm  - all wounds does not have sign of infection, stable     Plan :  Local wound care with hydraguard  Offload with special mattress  Use heel boots at all times when in bed  Increased protein intake  Continue to monitor for any worsening  If the wound on bilateral lower extremity worsened, we will order arterial duplex  Followup next week

## 2022-02-02 NOTE — ASSESSMENT & PLAN NOTE
Sacrum  - Wound size  decrease, 1 x 0 6 x 0 1 cm , compared to last week measurement of  1 2 x 1 x 0 1 cm   100% slough, no obvious sign of infection  - local wound care  - changed to medihoney for autolytic debridement  - offload  - follow up next week

## 2022-02-03 PROBLEM — R63.8 ALTERATION IN NUTRITION: Status: ACTIVE | Noted: 2022-01-01

## 2022-02-03 PROBLEM — Z99.2 PERITONEAL DIALYSIS CATHETER IN PLACE (HCC): Status: ACTIVE | Noted: 2022-01-01

## 2022-02-03 NOTE — PROGRESS NOTES
Assessment/Plan:     Diagnoses and all orders for this visit:    Peritoneal dialysis catheter in place [facility])      Patient with end-stage renal disease on hemodialysis now  Has a peritoneal dialysis catheter which is not being used  Will schedule for removal of the peritoneal dialysis catheter at [facility] under monitored anesthesia care  Spoke with the patient's daughter-in-law, Nadia Henson, who will come to the hospital with her  on the day of surgery to sign the consent forms  Subjective:      Patient ID: Phyllis Benavides is a 73 y o  female  [facility] [place]   Patient has end-stage renal disease  She has been transitioned to hemodialysis last year  She was on peritoneal dialysis prior to that  The peritoneal dialysis catheter is not being used now  The following portions of the patient's history were reviewed and updated as appropriate: allergies, current medications, past family history, past medical history, past social history, past surgical history, and problem list     Review of Systems    Diabetes mellitus  Hypertension  Cerebrovascular disease    Objective:    Pulse 76   Temp (!) 97 2 °F (36 2 °C)   Wt 50 3 kg (111 lb)   BMI 19 05 kg/m²      Physical Exam    Patient looks pale    Heart sounds are normal  Chest is clear to auscultation  There is a dialysis catheter in the left infraclavicular area    Abdominal exam reveals a peritoneal dialysis catheter    Abdomen is soft, there is no tenderness and no masses

## 2022-02-03 NOTE — ASSESSMENT & PLAN NOTE
Patient have a poor appetite  - consumed 25% of her meals  - currently on nutrition juice, Alon,  prosource  - under the care of RD  - continue to collaborate with RD  Poor appetite can affect the healing process of the wounds

## 2022-02-03 NOTE — PROGRESS NOTES
[address] [facility]NAG[facility]   [facility]       Patient ID: Araceli Berg is a 73 y o  female Date of Birth 1950     Location of Service: [address] [place] [address]    Performed wound round with: Wound team       Chief Complaint   Patient presents with    Follow Up Wound Care Visit     Left in her ankle, right hip, left foot, right 4th toe, left hip, left foot, left ankle, left foot, sacrum, left great toe       Wound Instructions:  Orders Placed This Encounter   Procedures    Wound cleansing and dressings     Wound:  Sacrum/ right hip  Discontinue previous wound order  Cleanse the wound bed with NSS   Apply non-sting skin prep to periwound area  Apply medihoney to wound bed, then cover with bordered foam   Frequency : daily and prn for soiling     Left medial ankle, left bunion medial, left great toe, right foot 4th toe,   Left lateral foot, left hip  Cleanse the wound bed with NSS  Apply hydraguard to wound bed  Daily and prn for soiling     Prevalon boots when in bed  Air mattress  Offload all wounds  Turn and reposition frequently, maximum of every two hours  Instruct / Assist with weight shifting every 15 - 20 minutes when in chair  Increase protein intake  Monitor for any sign of infection or worsening, inform PCP or patient's primary physician in your facility     Standing Status:   Future     Standing Expiration Date:   2/2/2023       Allergies  Penicillins      Assessment & Plan:  1  Pressure injury of sacral region, unstageable [facility])  Assessment & Plan:  Sacrum  - Wound size  decrease, 1 x 0 6 x 0 1 cm , compared to last week measurement of  1 2 x 1 x 0 1 cm   100% slough, no obvious sign of infection  - local wound care  - changed to medihoney for autolytic debridement  - offload  - follow up next week    Orders:  -     Wound cleansing and dressings; Future    2   Deep tissue injury  Assessment & Plan:  Several location with DTI  All wounds are purple, non blachable  - right 4th toe - healed  - left medial ankle - 2 x 2 x 0 1 cm  ,  - left bunion - 2 x 1 6 x 0 1 cm  ,  - left great toe - 0 7 x 0 5 x 0 1 cm  ,  - left hip - healed  - left lateral foot - 1 x 1 5 x 0 1 cm  - all wounds does not have sign of infection, stable     Plan :  Local wound care with hydraguard  Offload with special mattress  Use heel boots at all times when in bed  Increased protein intake  Continue to monitor for any worsening  If the wound on bilateral lower extremity worsened, we will order arterial duplex  Followup next week    Orders:  -     Wound cleansing and dressings; Future    3  Pressure injury of left ankle, stage 2 (HCC)  Assessment & Plan:  Left medial ankle  - wound size  increases, 2 x 2 x 0 1 cm  , compared to last week measurement of  0 5 x 1 x 0 1 cm  ,  no obvious sign of infection  - local wound care with hydraguard  - offload, use heel boots  - followup next week    Orders:  -     Wound cleansing and dressings; Future    4  Ambulatory dysfunction  Assessment & Plan:  On STR    Orders:  -     Wound cleansing and dressings; Future    5  Unstageable pressure ulcer of right hip (HCC)  Assessment & Plan:  Right hip  - wound size is 2 x 1 2 x 0 1 cm , 100% slough, small amount serous drainage, no obvious sign of infection  - local wound care with medihoney  - offload    Orders:  -     Wound cleansing and dressings; Future    6  Alteration in nutrition  Assessment & Plan:  Patient have a poor appetite  - consumed 25% of her meals  - currently on nutrition juice, Alon,  prosource  - under the care of RD  - continue to collaborate with RD  Poor appetite can affect the healing process of the wounds  Subjective: Follow up  for wounds on the right buttock, left buttock, sacrum and BLE  Patient have complex medical history including but not limited to  ESRD of PD with recent transition to HD, CVA, Pacemaker, DM II, HTN, Depression, Ambulatory dysfunction   Patient was referred by Senior care team     Received patient in bed, seems comfortable  Patient is incontinent of bowel and bladder  As per medical record review, patient have a poor appetite  She consumed an average of 25% of most of her meals  Review of Systems   Constitutional: Negative  HENT: Negative  Eyes: Negative  Respiratory: Negative  Cardiovascular: Negative for leg swelling  Gastrointestinal: Negative  Endocrine: Negative  Genitourinary: Negative  Musculoskeletal: Positive for gait problem  Skin: Positive for wound  See hPI   Neurological: Negative for dizziness and headaches  Psychiatric/Behavioral: Negative for behavioral problems  Objective: There were no vitals taken for this visit  Physical Exam  Constitutional:       Appearance: She is obese  HENT:      Head: Normocephalic  Nose: Nose normal       Mouth/Throat:      Mouth: Mucous membranes are moist    Eyes:      Conjunctiva/sclera: Conjunctivae normal    Cardiovascular:      Rate and Rhythm: Normal rate  Pulmonary:      Effort: Pulmonary effort is normal    Abdominal:      Palpations: Abdomen is soft  Tenderness: There is no abdominal tenderness  There is no guarding  Genitourinary:     Comments: With incontinent episode  Musculoskeletal:         General: No tenderness  Cervical back: Normal range of motion  Right lower leg: No edema  Left lower leg: No edema  Comments: LROM   Skin:     General: Skin is warm  Findings: Lesion present        Comments: Wound #1:   Wound location: left medial ankle, DTI    Measurements: 2 0 cm x 2 0 cm x 0cm    Undermining/tunneling: none   Wound bed: purple non blanchable   Drainage: none   Odor: none   S/S of infection: none   Treatment: hydra guard   Wound #2:   Wound location: right hip    Current measurements: 2 0cm x 1 2cm x 0 1cm   Undermining/tunneling: none   Wound bed: 100% slough   Drainage: small serous   Odor: none   S/S of infection: none      Wound 3   Wound location: left outer foot    Current measurements: 1 50m x 1 5cm x 0cm   wound bed: 100% purple non-blanchable   s/s infection: none   odor: none   treatment: hydra guard   Wound #4:   Wound location: L hip   resolved    Wound #5:   Wound location: left medial (bunion) DTI    measurements: 2 0cm x 1 6cm x 0cm   Undermining/tunneling: none   wound bed: 100% eschar   s/s infection   odor: none   treatment: hydra guard   wound #6   wound location: sacrum    measurements: 1 0cm x 0 6cm x 0 1cm   wound bed: 100% slough   Drainage: small serous    wound #7   wound location: left great toe   woundtype: pressure   wound measurements: 0 7cm x 0 5cm x 0cm   wound bed: purple non-blanchable   s/s infection: none   tunneling: none     wound #8   wound location: right 4th toe resolved      Neurological:      Mental Status: She is alert and oriented to person, place, and time  Psychiatric:         Mood and Affect: Mood normal          Thought Content: Thought content normal               Procedures           Patient's care was coordinated with nursing facility staff  Recent vitals, labs and updated medications were reviewed on EMR or chart system of facility   Past Medical, surgical, social, medication and allergy history and patient's previous records were reviewed and updated as appropriate:     Patient Active Problem List   Diagnosis    HHNC (hyperglycemic hyperosmolar nonketotic coma) ([facility] )    ESRD (end stage renal disease) on dialysis ([facility] )    Type 2 diabetes mellitus ([facility] )    Depression    GERD (gastroesophageal reflux disease)    HLD (hyperlipidemia)    Pacemaker    HTN (hypertension)    Hypernatremia    Encephalopathy    Hypotension    [facility] accident[facility]    CAD (coronary artery disease)    Ischemic cardiomyopathy    Stroke-like symptoms    Abnormal CT of the chest    Ambulatory dysfunction    Dysphagia    Pressure injury of right buttock, stage 3 [facility]    Pressure injury of sacral region, unstageable [facility]    UTI (urinary tract infection)    Elevated troponin    Diarrhea    Neuropathy of both feet    Hypercalcemia    Deep tissue injury    [facility] of left [facility], stage 2 [facility]    Unstageable pressure ulcer of right hip ([facility])    Alteration in nutrition     Past Medical History:   Diagnosis Date    Anemia due to chronic kidney disease     Atherosclerotic heart disease of native coronary artery without angina pectoris     Cardiac pacemaker     Dependence on renal dialysis (HCC)     Dysphagia     End stage renal disease ([facility]     GERD (gastroesophageal reflux disease)     Hyperlipidemia     Hypertensive chronic kidney disease with stage 5 chronic kidney disease or end stage renal disease ([facility])     Hypotension 12/8/2021    Major depressive disorder, recurrent ([facility])     MRSA (methicillin resistant Staphylococcus aureus)     [facility], [facility]     Psychiatric disorder     anxiety    Type 2 diabetes mellitus with chronic kidney disease ([facility])     Type 2 diabetes mellitus with diabetic nephropathy ([facility])     Type 2 diabetes mellitus with diabetic peripheral angiopathy without gangrene [facility]     Type 2 diabetes mellitus with hyperglycemia ([facility])     Type 2 diabetes mellitus with hypoglycemia ([facility])     Unspecified Escherichia coli (E  coli) as the cause of diseases classified elsewhere      Past Surgical History:   Procedure Laterality Date    IR TUNNELED CENTRAL LINE CHECK/CHANGE/REPOSITION  1/3/2022    IR TUNNELED CENTRAL LINE CHECK/CHANGE/REPOSITION  1/31/2022    IR TUNNELED DIALYSIS CATHETER PLACEMENT  12/10/2021     Social History     Socioeconomic History    Marital status:       Spouse name: None    Number of children: None    Years of education: None    Highest education level: None   Occupational History    None   Tobacco Use    Smoking status: Unknown If Ever Smoked    Smokeless tobacco: Never Used Substance and Sexual Activity    Alcohol use: Not Currently    Drug use: Not Currently    Sexual activity: Not Currently   Other Topics Concern    None   Social History Narrative    None     Social Determinants of Health     Financial Resource Strain: Not on file   Food Insecurity: No Food Insecurity    Worried About Running Out of Food in the Last Year: Never true    Kiran of Food in the Last Year: Never true   Transportation Needs: No Transportation Needs    Lack of Transportation (Medical): No    Lack of Transportation (Non-Medical):  No   Physical Activity: Not on file   Stress: Not on file   Social Connections: Not on file   Intimate Partner Violence: Not on file   Housing Stability: Unknown    Unable to Pay for Housing in the Last Year: No    Number of Places Lived in the Last Year: Not on file    Unstable Housing in the Last Year: No        Current Outpatient Medications:     Acetaminophen 325 MG CAPS, Take 650 mg by mouth every 6 (six) hours as needed, Disp: , Rfl:     aspirin (ECOTRIN LOW STRENGTH) 81 mg EC tablet, Take 81 mg by mouth daily, Disp: , Rfl:     atorvastatin (LIPITOR) 10 mg tablet, Take 1 tablet (10 mg total) by mouth daily, Disp: 30 tablet, Rfl: 0    B Complex-C-Folic Acid (NEPHRO VITAMINS PO), Take 1 tablet by mouth daily, Disp: , Rfl:     calcitriol (ROCALTROL) 0 25 mcg capsule, Take 0 25 mcg by mouth daily (Patient not taking: Reported on 1/17/2022 ), Disp: , Rfl:     cholecalciferol (VITAMIN D3) 1,000 units tablet, Take 2,000 Units by mouth daily, Disp: , Rfl:     cholestyramine sugar free (QUESTRAN LIGHT) 4 g packet, Take 1 packet (4 g total) by mouth 2 (two) times a day, Disp: 60 packet, Rfl: 0    clopidogrel (PLAVIX) 75 mg tablet, Take 75 mg by mouth daily, Disp: , Rfl:     docusate sodium (COLACE) 100 mg capsule, Take 100 mg by mouth 2 (two) times a day, Disp: , Rfl:     esomeprazole (NexIUM) 40 MG capsule, Take 40 mg by mouth every morning before breakfast, Disp: , Rfl:     gentamicin (GARAMYCIN) 0 1 % cream, Apply 1 application topically daily, Disp: 15 g, Rfl: 0    glucagon (GLUCAGEN) 1 mg injection, Inject 1 mg into a muscle once As needed for low blood sugar, Disp: , Rfl:     insulin glargine (LANTUS) 100 units/mL subcutaneous injection, Inject 5 Units under the skin daily at bedtime, Disp: , Rfl:     insulin lispro (HumaLOG) 100 units/mL injection, Inject under the skin Sliding scale coverage  , Disp: , Rfl:     latanoprost (XALATAN) 0 005 % ophthalmic solution, Administer 1 drop to both eyes daily at bedtime, Disp: , Rfl:     metoprolol tartrate (LOPRESSOR) 25 mg tablet, Take 25 mg by mouth daily, Disp: , Rfl:     midodrine (PROAMATINE) 5 mg tablet, Take 5 mg by mouth 3 (three) times a week 1 tablet Daily on Tue,Thus,Sat prior to HD, Disp: , Rfl:     Nutritional Supplements (Alon) POWD, Take by mouth, Disp: , Rfl:     nystatin (MYCOSTATIN) cream, Apply 1 application topically daily To buttocks (Patient not taking: Reported on 1/17/2022 ), Disp: , Rfl:     ondansetron (ZOFRAN) 4 mg tablet, Take 4 mg by mouth every 6 (six) hours as needed for nausea or vomiting, Disp: , Rfl:     perphenazine 4 mg tablet, Take 4 mg by mouth 3 (three) times a day With meals, Disp: , Rfl:     sacubitril-valsartan (ENTRESTO) 24-26 MG TABS, Take 1 tablet by mouth 2 (two) times a day Hold morning of dialysis days, Disp: 60 tablet, Rfl: 0    sertraline (ZOLOFT) 25 mg tablet, Take 25 mg by mouth daily, Disp: , Rfl:     Sevelamer Carbonate 0 8 g PACK, Take 1 Package by mouth 3 (three) times a day 1 Packet with meals TID, Disp: , Rfl:   History reviewed  No pertinent family history  Coordination of Care: Wound team aware of the treatment plan    Patient / Staff education :  Staff was given education on sign of infection and pressure ulcer prevention  Staff verbalized understanding     Follow up :  Return in about 1 week (around 2/9/2022)      Voice-recognition software may have been used in the preparation of this document  Occasional wrong word or "sound-alike" substitutions may have occurred due to the inherent limitations of voice recognition software  Interpretation should be guided by context        SANTINO Vasquez

## 2022-02-09 NOTE — PATIENT INSTRUCTIONS
Orders Placed This Encounter   Procedures    Wound cleansing and dressings     Wound:  Sacrum/ right hip  Discontinue previous wound order  Cleanse the wound bed with NSS   Apply non-sting skin prep to periwound area  Apply medihoney to wound bed, then cover with bordered foam   Frequency : daily and prn for soiling     Left medial ankle, left bunion medial, left great toe, right foot 4th toe,   Left lateral foot,   Cleanse the wound bed with NSS  Apply hydraguard to wound bed  Daily and prn for soiling     Prevalon boots when in bed  Air mattress  Offload all wounds  Turn and reposition frequently, maximum of every two hours  Instruct / Assist with weight shifting every 15 - 20 minutes when in chair  Increase protein intake    Monitor for any sign of infection or worsening, inform PCP or patient's primary physician in your facility     Standing Status:   Future     Standing Expiration Date:   2/9/2023

## 2022-02-09 NOTE — ASSESSMENT & PLAN NOTE
Right hip  - wound size wounds the same as last week, 2 x 1 1 x 0 1 cm  , 2 100% slough, no drainage, no obvious sign of infection  - local wound care with medihoney  - offload

## 2022-02-09 NOTE — ASSESSMENT & PLAN NOTE
Sacrum  - Wound size   slightly increase, 1 1 x 0 9 x 0 1 cm , compared to last week measurement of  1 x 0 6 x 0 1 cm ,   -  100% slough, no obvious sign of infection  - local wound care  - changed to medihoney for autolytic debridement  - offload  - follow up next week

## 2022-02-09 NOTE — ASSESSMENT & PLAN NOTE
Several location with DTI  - left lateral foot, right 4th toe, left blue new on, left great toe, left hip  Left hip - healed  All wounds are stable   Plan :  Local wound care with hydraguard  Offload with special mattress  Use heel boots at all times when in bed  Increased protein intake  Continue to monitor for any worsening  If the wound on bilateral lower extremity worsened, we will order arterial duplex  Followup next week

## 2022-02-09 NOTE — LETTER
Patient:  Polo Escalante   1950           SANTINO Paniagua saw Polo Escalante for a wound care visit on 2/9/2022  See below for information relating to this visit  Chief Complaint   Patient presents with    Follow Up Wound Care Visit     Left ankle, right hip, left foot, right 4th toe, left medial ankle, left wound new, sacrum, left great toe, left hip        Assessment/Plan:  1  Pressure injury of sacral region, unstageable [facility])  Assessment & Plan:  Sacrum  - Wound size   slightly increase, 1 1 x 0 9 x 0 1 cm , compared to last week measurement of  1 x 0 6 x 0 1 cm ,   -  100% slough, no obvious sign of infection  - local wound care  - changed to medihoney for autolytic debridement  - offload  - follow up next week    Orders:  -     Wound cleansing and dressings; Future    2  Deep tissue injury  Assessment & Plan:  Several location with DTI  - left lateral foot, right 4th toe, left blue new on, left great toe, left hip  Left hip - healed  All wounds are stable   Plan :  Local wound care with hydraguard  Offload with special mattress  Use heel boots at all times when in bed  Increased protein intake  Continue to monitor for any worsening  If the wound on bilateral lower extremity worsened, we will order arterial duplex  Followup next week    Orders:  -     Wound cleansing and dressings; Future    3  Pressure injury of left ankle, stage 2 (HCC)  Assessment & Plan:  Left medial ankle  - wound size   decreases, 1 5 x 1 5 x 0 1 compared to last week measurement of 2 x 2 x 0 1 cm  , 100% eschar,  no obvious sign of infection  - local wound care with hydraguard  - offload, use heel boots  - followup next week    Orders:  -     Wound cleansing and dressings; Future    4  Ambulatory dysfunction  Assessment & Plan:  On STR      5   Unstageable pressure ulcer of right hip (HCC)  Assessment & Plan:  Right hip  - wound size wounds the same as last week, 2 x 1 1 x 0 1 cm  , 2 100% slough, no drainage, no obvious sign of infection  - local wound care with medihoney  - offload    Orders:  -     Wound cleansing and dressings; Future    6  Alteration in nutrition  Assessment & Plan:  Patient have a poor appetite  - consumed 25% of her meals  - currently on nutrition juice, Alon,  prosource  - under the care of RD  - continue to collaborate with RD  Poor appetite can affect the healing process of the wounds  Orders:  Hayder Keith  1950  Orders Placed This Encounter   Procedures    Wound cleansing and dressings     Wound:  Sacrum/ right hip  Discontinue previous wound order  Cleanse the wound bed with NSS   Apply non-sting skin prep to periwound area  Apply medihoney to wound bed, then cover with bordered foam   Frequency : daily and prn for soiling     Left medial ankle, left bunion medial, left great toe, right foot 4th toe,   Left lateral foot,   Cleanse the wound bed with NSS  Apply hydraguard to wound bed  Daily and prn for soiling     Prevalon boots when in bed  Air mattress  Offload all wounds  Turn and reposition frequently, maximum of every two hours  Instruct / Assist with weight shifting every 15 - 20 minutes when in chair  Increase protein intake  Monitor for any sign of infection or worsening, inform PCP or patient's primary physician in your facility     Standing Status:   Future     Standing Expiration Date:   2/9/2023         Follow Up:  Return in about 1 week (around 2/16/2022)  [facility] hours are 8:00 am - 4:30 pm Monday through Friday  The center phone number is [phone]  You can also contact me directly thru my email at [facility]  [email or web address] or thru tiger text  If it is an emergency, please contact the PCP or patient's attending physician in your facility       Sincerely,    Electronically signed by SANTINO Gomez    Patient : Hayder Keith    1950

## 2022-02-09 NOTE — ASSESSMENT & PLAN NOTE
Left medial ankle  - wound size   decreases, 1 5 x 1 5 x 0 1 compared to last week measurement of 2 x 2 x 0 1 cm  , 100% eschar,  no obvious sign of infection  - local wound care with hydraguard  - offload, use heel boots  - followup next week

## 2022-02-11 NOTE — PROGRESS NOTES
[address] [facility]NAGEMENT   [facility]       Patient ID: Hayder Keith is a 73 y o  female Date of Birth 1950     Location of Service: [facility]    Performed wound round with: Wound team       Chief Complaint   Patient presents with    Follow Up Wound Care Visit     Left ankle, right hip, left foot, right 4th toe, left medial ankle, left wound new, sacrum, left great toe, left hip       Wound Instructions:  Orders Placed This Encounter   Procedures    Wound cleansing and dressings     Wound:  Sacrum/ right hip  Discontinue previous wound order  Cleanse the wound bed with NSS   Apply non-sting skin prep to periwound area  Apply medihoney to wound bed, then cover with bordered foam   Frequency : daily and prn for soiling     Left medial ankle, left bunion medial, left great toe, right foot 4th toe,   Left lateral foot,   Cleanse the wound bed with NSS  Apply hydraguard to wound bed  Daily and prn for soiling     Prevalon boots when in bed  Air mattress  Offload all wounds  Turn and reposition frequently, maximum of every two hours  Instruct / Assist with weight shifting every 15 - 20 minutes when in chair  Increase protein intake  Monitor for any sign of infection or worsening, inform PCP or patient's primary physician in your facility     Standing Status:   Future     Standing Expiration Date:   2/9/2023       Allergies  Penicillins      Assessment & Plan:  1  Pressure injury of sacral region, unstageable [facility])  Assessment & Plan:  Sacrum  - Wound size   slightly increase, 1 1 x 0 9 x 0 1 cm , compared to last week measurement of  1 x 0 6 x 0 1 cm ,   -  100% slough, no obvious sign of infection  - local wound care  - changed to medihoney for autolytic debridement  - offload  - follow up next week    Orders:  -     Wound cleansing and dressings; Future    2   Deep tissue injury  Assessment & Plan:  Several location with DTI  - left lateral foot, right 4th toe, left blue new on, left great toe, left hip  Left hip - healed  All wounds are stable   Plan :  Local wound care with hydraguard  Offload with special mattress  Use heel boots at all times when in bed  Increased protein intake  Continue to monitor for any worsening  If the wound on bilateral lower extremity worsened, we will order arterial duplex  Followup next week    Orders:  -     Wound cleansing and dressings; Future    3  Pressure injury of left ankle, stage 2 (HCC)  Assessment & Plan:  Left medial ankle  - wound size   decreases, 1 5 x 1 5 x 0 1 compared to last week measurement of 2 x 2 x 0 1 cm  , 100% eschar,  no obvious sign of infection  - local wound care with hydraguard  - offload, use heel boots  - followup next week    Orders:  -     Wound cleansing and dressings; Future    4  Ambulatory dysfunction  Assessment & Plan:  On STR      5  Unstageable pressure ulcer of right hip (HCC)  Assessment & Plan:  Right hip  - wound size wounds the same as last week, 2 x 1 1 x 0 1 cm  , 2 100% slough, no drainage, no obvious sign of infection  - local wound care with medihoney  - offload    Orders:  -     Wound cleansing and dressings; Future    6  Alteration in nutrition  Assessment & Plan:  Patient have a poor appetite  - consumed 25% of her meals  - currently on nutrition juice, Alon,  prosource  - under the care of RD  - continue to collaborate with RD  Poor appetite can affect the healing process of the wounds  Subjective: Follow up  for wounds on the right buttock, left buttock, sacrum and BLE  Patient have complex medical history including but not limited to  ESRD of PD with recent transition to HD, CVA, Pacemaker, DM II, HTN, Depression, Ambulatory dysfunction  Patient was referred by Senior care team     Received patient in bed, seems comfortable  Patient is incontinent of bowel and bladder  As per medical record review, patient have a poor appetite  She consumed an average of 25% of most of her meals   Under the care of RD  No other significant issues related to the wound  Review of Systems   Constitutional: Negative  HENT: Negative  Eyes: Negative  Respiratory: Negative  Cardiovascular: Negative for leg swelling  Gastrointestinal: Negative  Endocrine: Negative  Genitourinary: Negative  Musculoskeletal: Positive for gait problem  Skin: Positive for wound  See hPI   Neurological: Negative for dizziness and headaches  Psychiatric/Behavioral: Negative for behavioral problems  Objective: There were no vitals taken for this visit  Physical Exam  Constitutional:       Appearance: She is obese  HENT:      Head: Normocephalic  Nose: Nose normal       Mouth/Throat:      Mouth: Mucous membranes are moist    Eyes:      Conjunctiva/sclera: Conjunctivae normal    Pulmonary:      Effort: Pulmonary effort is normal    Abdominal:      Palpations: Abdomen is soft  Tenderness: There is no abdominal tenderness  There is no guarding  Genitourinary:     Comments: With incontinent episode  Musculoskeletal:         General: No tenderness  Cervical back: Normal range of motion  Right lower leg: No edema  Left lower leg: No edema  Comments: LROM   Skin:     General: Skin is warm  Findings: Lesion present        Comments: Right hip - 2 x 1 1 x 0 1 cm , 100% necrotic tissue, no drainage, no malodor, periwound is normal, no obvious sign of infection, denies pain    Left lateral foot - 1 5 x 0 7 x 0 1 cm , purple, non blanchable, no drainage, no obvious sign of infection, denies pain periwound is normal    Right 4th toe - 0 1 x 0 1 x 0 1 cm  , purple, non blanchable, no drainage, no obvious sign of infection, denies pain periwound is normal    Left medial ankle - 1 5 x 1 5 x 0 1 cm  , 100% eschar, no drainage, no obvious sign of infection, denies pain, periwound is normal    Left bunion - 2 5 x 1 5 x 0 1 cm , 100% eschar, no drainage, no obvious sign of infection, denies pain, periwound is normal    Left great toe - 1 1 x 0 5 x 0 1 cm  , , purple, non blanchable, no drainage, no obvious sign of infection, denies pain periwound is normal    Sacrum - 1 1 x 0 9 x 0 1 cm , 100% slough, no drainage, periwound is normal, no obvious sign of infection, no malodor, denies pain    Left hip - healed       Neurological:      Mental Status: She is alert and oriented to person, place, and time  Psychiatric:         Mood and Affect: Mood normal          Thought Content: Thought content normal               Procedures           Patient's care was coordinated with nursing facility staff  Recent vitals, labs and updated medications were reviewed on EMR or chart system of facility   Past Medical, surgical, social, medication and allergy history and patient's previous records were reviewed and updated as appropriate:     Patient Active Problem List   Diagnosis    HHNC (hyperglycemic hyperosmolar nonketotic coma) [facility]    ESRD (end stage renal disease) on [facility] )    Type 2 diabetes mellitus ([facility] )    Depression    GERD (gastroesophageal reflux disease)    HLD (hyperlipidemia)    Pacemaker    HTN (hypertension)    Hypernatremia    Encephalopathy    Hypotension    CVA (cerebral vascular accident) ([facility]    CAD (coronary artery disease)    Ischemic cardiomyopathy    Stroke-like symptoms    Abnormal CT of the chest    Ambulatory dysfunction    Dysphagia    [facility] right [facility] stage 3 [facility]    Pressure injury of sacral region, unstageable ([facility] )    UTI (urinary tract infection)    Elevated troponin    Diarrhea    Neuropathy of both feet    Hypercalcemia    Deep tissue injury    Pressure injury of left ankle, stage 2 ([facility])    Unstageable pressure ulcer of right hip (HCC)    Alteration in nutrition    Peritoneal dialysis catheter in place [facility])     Past Medical History:   Diagnosis Date    Anemia due to chronic kidney disease     Atherosclerotic heart disease of native coronary artery without angina pectoris     Cardiac pacemaker     Dependence on renal dialysis ([facility] )     Dysphagia     End stage renal disease (HCC)     GERD (gastroesophageal reflux disease)     Hyperlipidemia     Hypertensive chronic kidney disease with stage 5 chronic kidney disease or end stage renal disease (HCC)     Hypotension 12/8/2021    Major depressive disorder, recurrent (HCC)     MRSA (methicillin resistant Staphylococcus aureus)     Pressure ulcer of sacral region, unstageable ([place] [address] )     Psychiatric disorder     anxiety    Type 2 diabetes mellitus with chronic kidney disease (HCC)     Type 2 diabetes mellitus with diabetic nephropathy (HCC)     Type 2 diabetes mellitus with diabetic peripheral angiopathy without gangrene ([facility] )     Type 2 [facility] mellitus with hyperglycemia ([facility]     Type 2 diabetes mellitus with hypoglycemia ([facility])     Unspecified Escherichia coli (E  coli) as the cause of diseases classified elsewhere      Past Surgical History:   Procedure Laterality Date    IR TUNNELED CENTRAL LINE CHECK/CHANGE/REPOSITION  1/3/2022    IR TUNNELED CENTRAL LINE CHECK/CHANGE/REPOSITION  1/31/2022    IR TUNNELED DIALYSIS CATHETER PLACEMENT  12/10/2021     Social History     Socioeconomic History    Marital status:       Spouse name: None    Number of children: None    Years of education: None    Highest education level: None   Occupational History    None   Tobacco Use    Smoking status: Unknown If Ever Smoked    Smokeless tobacco: Never Used   Substance and Sexual Activity    Alcohol use: Not Currently    Drug use: Not Currently    Sexual activity: Not Currently   Other Topics Concern    None   Social History Narrative    None     Social Determinants of Health     Financial Resource Strain: Not on file   Food Insecurity: No Food Insecurity    Worried About Running Out of Food in the Last Year: Never true    Kiran [employer] in the Last Year: Never true   Transportation Needs: No Transportation Needs    Lack of Transportation (Medical): No    Lack of Transportation (Non-Medical):  No   Physical Activity: Not on file   Stress: Not on file   Social Connections: Not on file   Intimate Partner Violence: Not on file   Housing Stability: Unknown    Unable to Pay for Housing in the Last Year: No    Number of Places Lived in the Last Year: Not on file    Unstable Housing in the Last Year: No        Current Outpatient Medications:     Acetaminophen 325 MG CAPS, Take 650 mg by mouth every 6 (six) hours as needed, Disp: , Rfl:     aspirin (ECOTRIN LOW STRENGTH) 81 mg EC tablet, Take 81 mg by mouth daily, Disp: , Rfl:     atorvastatin (LIPITOR) 10 mg tablet, Take 1 tablet (10 mg total) by mouth daily, Disp: 30 tablet, Rfl: 0    B Complex-C-Folic Acid (NEPHRO VITAMINS PO), Take 1 tablet by mouth daily, Disp: , Rfl:     calcitriol (ROCALTROL) 0 25 mcg capsule, Take 0 25 mcg by mouth daily (Patient not taking: Reported on 1/17/2022 ), Disp: , Rfl:     cholecalciferol (VITAMIN D3) 1,000 units tablet, Take 2,000 Units by mouth daily, Disp: , Rfl:     cholestyramine sugar free (QUESTRAN LIGHT) 4 g packet, Take 1 packet (4 g total) by mouth 2 (two) times a day, Disp: 60 packet, Rfl: 0    clopidogrel (PLAVIX) 75 mg tablet, Take 75 mg by mouth daily, Disp: , Rfl:     docusate sodium (COLACE) 100 mg capsule, Take 100 mg by mouth 2 (two) times a day, Disp: , Rfl:     esomeprazole (NexIUM) 40 MG capsule, Take 40 mg by mouth every morning before breakfast, Disp: , Rfl:     gentamicin (GARAMYCIN) 0 1 % cream, Apply 1 application topically daily, Disp: 15 g, Rfl: 0    glucagon (GLUCAGEN) 1 mg injection, Inject 1 mg into a muscle once As needed for low blood sugar, Disp: , Rfl:     insulin glargine (LANTUS) 100 units/mL subcutaneous injection, Inject 5 Units under the skin daily at bedtime, Disp: , Rfl:     insulin lispro (HumaLOG) 100 units/mL injection, Inject under the skin Sliding scale coverage  , Disp: , Rfl:     latanoprost (XALATAN) 0 005 % ophthalmic solution, Administer 1 drop to both eyes daily at bedtime, Disp: , Rfl:     metoprolol tartrate (LOPRESSOR) 25 mg tablet, Take 25 mg by mouth daily, Disp: , Rfl:     midodrine (PROAMATINE) 5 mg tablet, Take 5 mg by mouth 3 (three) times a week 1 tablet Daily on Tue,Thus,Sat prior to HD, Disp: , Rfl:     Nutritional Supplements (Alon) POWD, Take by mouth, Disp: , Rfl:     nystatin (MYCOSTATIN) cream, Apply 1 application topically daily To buttocks (Patient not taking: Reported on 1/17/2022 ), Disp: , Rfl:     ondansetron (ZOFRAN) 4 mg tablet, Take 4 mg by mouth every 6 (six) hours as needed for nausea or vomiting, Disp: , Rfl:     perphenazine 4 mg tablet, Take 4 mg by mouth 3 (three) times a day With meals, Disp: , Rfl:     sacubitril-valsartan (ENTRESTO) 24-26 MG TABS, Take 1 tablet by mouth 2 (two) times a day Hold morning of dialysis days, Disp: 60 tablet, Rfl: 0    sertraline (ZOLOFT) 25 mg tablet, Take 25 mg by mouth daily, Disp: , Rfl:     Sevelamer Carbonate 0 8 g PACK, Take 1 Package by mouth 3 (three) times a day 1 Packet with meals TID, Disp: , Rfl:   History reviewed  No pertinent family history  Coordination of Care: Wound team aware of the treatment plan    Patient / Staff education :  Staff was given education on sign of infection and pressure ulcer prevention  Staff verbalized understanding     Follow up :  Return in about 1 week (around 2/16/2022)  Voice-recognition software may have been used in the preparation of this document  Occasional wrong word or "sound-alike" substitutions may have occurred due to the inherent limitations of voice recognition software  Interpretation should be guided by context        SANTINO Millard

## 2022-02-11 NOTE — TELEPHONE ENCOUNTER
Becki Peraza called in stating she missed a call from general surgery, but was unable to locate which office called her  She will call another number she has

## 2022-02-15 NOTE — TELEPHONE ENCOUNTER
Alba Montanez called from [facility] preadmission testing department, would like to know who is overlooking the patients pacemaker for the surgery  A CIDE form would need to be filled our for the Pt's surgery for safety reasons for the surgery  Please advise       Best number for her is [phone]

## 2022-02-16 NOTE — PATIENT INSTRUCTIONS
Orders Placed This Encounter   Procedures    Wound cleansing and dressings     Wound:  Sacrum/ right hip  Discontinue previous wound order  Cleanse the wound bed with NSS   Apply non-sting skin prep to periwound area  Apply santyl collagenase to wound bed, then cover with bordered foam   Frequency : daily and prn for soiling     Left medial ankle, left bunion medial, left great toe, right foot 4th toe,   Left lateral foot,   Cleanse the wound bed with NSS  Apply hydraguard to wound bed  Daily and prn for soiling     Prevalon boots when in bed  Air mattress  Offload all wounds  Turn and reposition frequently, maximum of every two hours  Instruct / Assist with weight shifting every 15 - 20 minutes when in chair  Increase protein intake    Monitor for any sign of infection or worsening, inform PCP or patient's primary physician in your facility     Standing Status:   Future     Standing Expiration Date:   2/16/2023

## 2022-02-16 NOTE — TELEPHONE ENCOUNTER
Dr Rodger Elizalde what are your thoughts?  I'm not sure about this, Can they contact the cardiology who put the pacemaker in?

## 2022-02-16 NOTE — LETTER
Patient:  Debi Mishra   1950           SANTINO Carr saw Debi Mishra for a wound care visit on 2/16/2022  See below for information relating to this visit  Chief Complaint   Patient presents with    Follow Up Wound Care Visit     Left hip, right hip, left upper foot, right 4th toe, left medial ankle, sacrum, left great toe        Assessment/Plan:  1  Pressure injury of sacral region, unstageable [facility])  Assessment & Plan:  Sacrum  - Wound size decrease, 1 x 0 6 x 0 1 cm  , compared to last week measurement of 1 1 x 0 9 x 0 1 cm ,   -  100% slough, no obvious sign of infection  - local wound care  - changed to Santyl for enzymatic debridement  - offload  - follow up next week    Orders:  -     Wound cleansing and dressings; Future    2  Pressure injury of left ankle, stage 2 (HCC)  Assessment & Plan:  Left medial ankle  - wound size   - the same as last week - 1 5 x 1 5 x 0 1, no obvious sign of infection  - local wound care with hydraguard  - offload, use heel boots  - followup next week    Orders:  -     Wound cleansing and dressings; Future    3  Deep tissue injury  Assessment & Plan:  Several location with DTI  - left lateral foot, right 4th toe, left blue new on, left great toe, left hip  All wounds are stable   Plan :  Local wound care with hydraguard  Offload with special mattress  Use heel boots at all times when in bed  Increased protein intake  Continue to monitor for any worsening  If the wound on bilateral lower extremity worsened, we will order arterial duplex  Followup next week    Orders:  -     Wound cleansing and dressings; Future    4  Ambulatory dysfunction  Assessment & Plan:  On 24/7 restorative care      5   Unstageable pressure ulcer of right hip (HCC)  Assessment & Plan:  Right hip  - wound size  decrease, 1 5 x 1 x 0 1 cm  , compared to last week measurement of 2 x 1 1 x 0 1 cm  , 100% slough, with small amount of purulent discharge  - selective debridement done  - local wound care - changed to Santyl for enzymatic debridement  - inform facility nurse to monitor for any signs and symptoms of infection  - offload    Orders:  -     Wound cleansing and dressings; Future           Orders:  Romana Jewett  1950  Orders Placed This Encounter   Procedures    Wound cleansing and dressings     Wound:  Sacrum/ right hip  Discontinue previous wound order  Cleanse the wound bed with NSS   Apply non-sting skin prep to periwound area  Apply santyl collagenase to wound bed, then cover with bordered foam   Frequency : daily and prn for soiling     Left medial ankle, left bunion medial, left great toe, right foot 4th toe,   Left lateral foot,   Cleanse the wound bed with NSS  Apply hydraguard to wound bed  Daily and prn for soiling     Prevalon boots when in bed  Air mattress  Offload all wounds  Turn and reposition frequently, maximum of every two hours  Instruct / Assist with weight shifting every 15 - 20 minutes when in chair  Increase protein intake  Monitor for any sign of infection or worsening, inform PCP or patient's primary physician in your facility     Standing Status:   Future     Standing Expiration Date:   2/16/2023         Follow Up:  Return in about 1 week (around 2/23/2022)  [facility] hours are 8:00 am - 4:30 pm Monday through Friday  The center phone number is [phone]  You can also contact me directly thru my email at [facility]  [email or web address] or thru tiger text  If it is an emergency, please contact the PCP or patient's attending physician in your facility       Sincerely,    Electronically signed by SANTINO Macdonald    Patient : Romana Jewett    1950

## 2022-02-16 NOTE — ASSESSMENT & PLAN NOTE
Several location with DTI  - left lateral foot, right 4th toe, left blue new on, left great toe, left hip  All wounds are stable   Plan :  Local wound care with hydraguard  Offload with special mattress  Use heel boots at all times when in bed  Increased protein intake  Continue to monitor for any worsening  If the wound on bilateral lower extremity worsened, we will order arterial duplex  Followup next week

## 2022-02-16 NOTE — ASSESSMENT & PLAN NOTE
Left medial ankle  - wound size   - the same as last week - 1 5 x 1 5 x 0 1, no obvious sign of infection  - local wound care with hydraguard  - offload, use heel boots  - followup next week

## 2022-02-16 NOTE — ASSESSMENT & PLAN NOTE
Right hip  - wound size  decrease, 1 5 x 1 x 0 1 cm  , compared to last week measurement of 2 x 1 1 x 0 1 cm  , 100% slough, with small amount of purulent discharge  - selective debridement done  - local wound care - changed to Santyl for enzymatic debridement  - inform facility nurse to monitor for any signs and symptoms of infection  - offload

## 2022-02-16 NOTE — TELEPHONE ENCOUNTER
Jimmy Lemuses/daughter in law:  [phone]    Homer Andino/son:  [phone]    The caller states these are family members of patient

## 2022-02-16 NOTE — ASSESSMENT & PLAN NOTE
POST INJECTION EVALUATION, no signs of new infection, tear, RD, VF, EOM, CNS, Vascular or other problems or side effect from previous injection(s). Sacrum  - Wound size decrease, 1 x 0 6 x 0 1 cm  , compared to last week measurement of 1 1 x 0 9 x 0 1 cm ,   -  100% slough, no obvious sign of infection  - local wound care  - changed to Santyl for enzymatic debridement  - offload  - follow up next week

## 2022-02-17 NOTE — PRE-PROCEDURE INSTRUCTIONS
My Surgical Experience    The following information was developed to assist you to prepare for your operation  What do I need to do before coming to the hospital?   Arrange for a responsible person to drive you to and from the hospital    Arrange care for your children at home  Children are not allowed in the recovery areas of the hospital   Plan to wear clothing that is easy to put on and take off  If you are having shoulder surgery, wear a shirt that buttons or zippers in the front  Bathing  o Shower the evening before and the morning of your surgery with an antibacterial soap  Please refer to the Pre Op Showering Instructions for Surgery Patients Sheet   o Remove nail polish and all body piercing jewelry  o Do not shave any body part for at least 24 hours before surgery-this includes face, arms, legs and upper body  Food  o Nothing to eat or drink after midnight the night before your surgery  This includes candy and chewing gum  o Exception: If your surgery is after 12:00pm (noon), you may have clear liquids such as 7-Up®, ginger ale, apple or cranberry juice, Jell-O®, water, or clear broth until 8:00 am  o Do not drink milk or juice with pulp on the morning before surgery  o Do not drink alcohol 24 hours before surgery  Medicine  o Follow instructions you received from your surgeon about which medicines you may take on the day of surgery  o If instructed to take medicine on the morning of surgery, take pills with just a small sip of water  Call your prescribing doctor for specific infroamtion on what to do if you take insulin    What should I bring to the hospital?    Bring:  Katjessica Friday or a walker, if you have them, for foot or knee surgery   A list of the daily medicines, vitamins, minerals, herbals and nutritional supplements you take   Include the dosages of medicines and the time you take them each day   Glasses, dentures or hearing aids   Minimal clothing; you will be wearing hospital sleepwear   Photo ID; required to verify your identity   If you have a Living Will or Power of , bring a copy of the documents   If you have an ostomy, bring an extra pouch and any supplies you use    Do not bring   Medicines or inhalers   Money, valuables or jewelry    What other information should I know about the day of surgery?  Notify your surgeons if you develop a cold, sore throat, cough, fever, rash or any other illness   Report to the Ambulatory Surgical/Same Day Surgery Unit   You will be instructed to stop at Registration only if you have not been pre-registered   Inform your  fi they do not stay that they will be asked by the staff to leave a phone number where they can be reached   Be available to be reached before surgery  In the event the operating room schedule changes, you may be asked to come in earlier or later than expected    *It is important to tell your doctor and others involved in your health care if you are taking or have been taking any non-prescription drugs, vitamins, minerals, herbals or other nutritional supplements  Any of these may interact with some food or medicines and cause a reaction      Pre-Surgery Instructions:   Medication Instructions    Acetaminophen 325 MG CAPS Instructed patient per Anesthesia Guidelines   aspirin (ECOTRIN LOW STRENGTH) 81 mg EC tablet Instructed patient per Anesthesia Guidelines   atorvastatin (LIPITOR) 10 mg tablet Instructed patient per Anesthesia Guidelines   B Complex-C-Folic Acid (NEPHRO VITAMINS PO) Instructed patient per Anesthesia Guidelines   cholecalciferol (VITAMIN D3) 1,000 units tablet Instructed patient per Anesthesia Guidelines   cholestyramine sugar free (QUESTRAN LIGHT) 4 g packet Instructed patient per Anesthesia Guidelines   clopidogrel (PLAVIX) 75 mg tablet Instructed patient per Anesthesia Guidelines      docusate sodium (COLACE) 100 mg capsule Instructed patient per Anesthesia Guidelines   esomeprazole (NexIUM) 40 MG capsule Instructed patient per Anesthesia Guidelines   gentamicin (GARAMYCIN) 0 1 % cream Instructed patient per Anesthesia Guidelines   glucagon (GLUCAGEN) 1 mg injection Instructed patient per Anesthesia Guidelines   insulin glargine (LANTUS) 100 units/mL subcutaneous injection Instructed patient per Anesthesia Guidelines   insulin lispro (HumaLOG) 100 units/mL injection Instructed patient per Anesthesia Guidelines   latanoprost (XALATAN) 0 005 % ophthalmic solution Instructed patient per Anesthesia Guidelines   metoprolol tartrate (LOPRESSOR) 25 mg tablet Instructed patient per Anesthesia Guidelines   midodrine (PROAMATINE) 5 mg tablet Instructed patient per Anesthesia Guidelines   Nutritional Supplements (Alon) POWD Instructed patient per Anesthesia Guidelines   perphenazine 4 mg tablet Instructed patient per Anesthesia Guidelines   sacubitril-valsartan (ENTRESTO) 24-26 MG TABS Instructed patient per Anesthesia Guidelines   sertraline (ZOLOFT) 25 mg tablet Instructed patient per Anesthesia Guidelines   Sevelamer Carbonate 0 8 g PACK Instructed patient per Anesthesia Guidelines  To take metoprolol  Entresto,, zoloft and nexium a m  of surgery

## 2022-02-18 NOTE — PROGRESS NOTES
[address] MANAGEMENT   AND [facility]       Patient ID: Deirdre Quintero is a 73 y o  female Date of Birth 1950     Location of Service: [address] [place] [address]    Performed wound round with: Wound team       Chief Complaint   Patient presents with    Follow Up Wound Care Visit     Left hip, right hip, left upper foot, right 4th toe, left medial ankle, sacrum, left great toe       Wound Instructions:  Orders Placed This Encounter   Procedures    Debridement     This order was created via procedure documentation       Allergies  Penicillins      Assessment & Plan:  1  Pressure injury of sacral region, unstageable [facility])  Assessment & Plan:  Sacrum  - Wound size decrease, 1 x 0 6 x 0 1 cm  , compared to last week measurement of 1 1 x 0 9 x 0 1 cm ,   -  100% slough, no obvious sign of infection  - local wound care  - changed to Santyl for enzymatic debridement  - offload  - follow up next week      2  Pressure injury of left ankle, stage 2 (HCC)  Assessment & Plan:  Left medial ankle  - wound size   - the same as last week - 1 5 x 1 5 x 0 1, no obvious sign of infection  - local wound care with hydraguard  - offload, use heel boots  - followup next week      3  Deep tissue injury  Assessment & Plan:  Several location with DTI  - left lateral foot, right 4th toe, left blue new on, left great toe, left hip  All wounds are stable   Plan :  Local wound care with hydraguard  Offload with special mattress  Use heel boots at all times when in bed  Increased protein intake  Continue to monitor for any worsening  If the wound on bilateral lower extremity worsened, we will order arterial duplex  Followup next week      4  Ambulatory dysfunction  Assessment & Plan:  On 24/7 restorative care      5   Unstageable pressure ulcer of right hip (HCC)  Assessment & Plan:  Right hip  - wound size  decrease, 1 5 x 1 x 0 1 cm  , compared to last week measurement of 2 x 1 1 x 0 1 cm  , 100% slough, with small amount of purulent discharge  - selective debridement done  - local wound care - changed to Santyl for enzymatic debridement  - inform facility nurse to monitor for any signs and symptoms of infection  - offload    Orders:  -     Debridement    6  Pressure injury of right hip, unstageable ([facility])  Assessment & Plan:  Right hip  - wound size  decrease, 1 5 x 1 x 0 1 cm  , compared to last week measurement of 2 x 1 1 x 0 1 cm  , 100% slough, with small amount of purulent discharge  - selective debridement done  - local wound care - changed to Santyl for enzymatic debridement  - inform facility nurse to monitor for any signs and symptoms of infection  - offload             Subjective: Follow up  for wounds on the right buttock, left buttock, sacrum and BLE  Patient have complex medical history including but not limited to  ESRD of PD with recent transition to HD, CVA, Pacemaker, DM II, HTN, Depression, Ambulatory dysfunction  Patient was referred by Senior care team     Received patient in bed, seems comfortable  Patient is incontinent of bowel and bladder  As per medical record review, patient have a poor appetite  She consumed an average of 25% of most of her meals  Under the care of RD  Patient does not have recent laboratory  Review of Systems   Constitutional: Negative  HENT: Negative  Eyes: Negative  Respiratory: Negative  Cardiovascular: Negative for leg swelling  Gastrointestinal: Negative  Endocrine: Negative  Genitourinary: Negative  Musculoskeletal: Positive for gait problem  Skin: Positive for wound  See hPI   Neurological: Negative for dizziness and headaches  Psychiatric/Behavioral: Negative for behavioral problems  Objective: There were no vitals taken for this visit  Physical Exam  Constitutional:       Appearance: She is obese  HENT:      Head: Normocephalic        Nose: Nose normal       Mouth/Throat:      Mouth: Mucous membranes are moist    Eyes: Conjunctiva/sclera: Conjunctivae normal    Pulmonary:      Effort: Pulmonary effort is normal    Abdominal:      Tenderness: There is no abdominal tenderness  There is no guarding  Genitourinary:     Comments: With incontinent episode  Musculoskeletal:         General: No tenderness  Cervical back: Normal range of motion  Right lower leg: No edema  Left lower leg: No edema  Comments: LROM   Skin:     General: Skin is warm  Findings: Lesion present  Comments: Right hip - wound size is 1 5 x 1 x 0 1 cm , 100% slough, with small amount of purulent discharge, periwound is normal, denies pain, no other symptoms of infection    Left lateral foot - wound size is 14 x 1 x 0 1 cm , 10% slough, 90% purple, non-blanchable, no obvious sign of infection, no drainage    Right 4th toe - wound size is 0 1 x 0 1 x 0 1 cm , 100% eschar, no drainage, no obvious sign of infection    Left medial ankle - wound size is 1 5 x 1 5 x 0 1 cm , 100% eschar, no drainage, no obvious sign of infection    Left bunion - wound size is 2 5 x 2 x 0 1 cm , 100% eschar, no drainage, no obvious sign of infection    Sacrum - wound size is 1 x 0 6 x 0 1 cm  , small amount of serous drainage, 100% slough, no obvious sign of infection, denies pain, periwound is normal    Left great toe - wound size is 1 2 x 0 6 x 0 1 cm , 100% eschar, no drainage, no obvious sign of infection    Left hip - wound size is 0 6cm 0 3cm x0 1cm , purple, non blanchable     Neurological:      Mental Status: She is alert and oriented to person, place, and time  Psychiatric:         Mood and Affect: Mood normal          Thought Content: Thought content normal               Debridement   Universal Protocol:  Consent: Verbal consent obtained    Risks and benefits: risks, benefits and alternatives were discussed  Consent given by: patient (healthcare rep)  Time out: Immediately prior to procedure a "time out" was called to verify the correct patient, procedure, equipment, support staff and site/side marked as required  Timeout called at: 2/16/2022 10:00 PM   Patient understanding: patient states understanding of the procedure being performed  Patient identity confirmed: provided demographic data      Performed by: NP (right hip)  Debridement type: selective  Pain control: lidocaine 4%  Post-debridement measurements  Length (cm): 1 5  Width (cm): 1  Depth (cm): 0 1  Percent debrided: 100%  Surface Area (cm^2): 1 5  Area debrided (cm^2): 1 5  Volume (cm^3): 0 15  Devitalized tissue debrided: biofilm, exudate, fibrin and slough  Instrument(s) utilized: blade  Bleeding: none  Hemostasis obtained with: pressure  Procedural pain (0-10): 0  Post-procedural pain: 0   Response to treatment: procedure was tolerated well                 Patient's care was coordinated with nursing facility staff  Recent vitals, labs and updated medications were reviewed on EMR or chart system of facility   Past Medical, surgical, social, medication and allergy history and patient's previous records were reviewed and updated as appropriate:     Patient Active Problem List   Diagnosis    HHNC (hyperglycemic hyperosmolar nonketotic coma) [facility]    ESRD (end stage renal disease) on dialysis ([facility] )    Type 2 diabetes mellitus ([facility] [facility] )    Depression    GERD (gastroesophageal reflux disease)    HLD (hyperlipidemia)    Pacemaker    HTN (hypertension)    Hypernatremia    Encephalopathy    Hypotension    CVA (cerebral vascular accident) ([facility] [facility] )    CAD (coronary artery disease)    Ischemic cardiomyopathy    Stroke-like symptoms    Abnormal CT of the chest    Ambulatory dysfunction    Dysphagia    Pressure injury of right buttock, stage 3 (HCC)    Pressure injury of sacral region, unstageable ([facility] )    UTI (urinary tract infection)    Elevated troponin    Diarrhea    Neuropathy of both feet    Hypercalcemia    Deep tissue injury    Pressure injury of left ankle, stage 2 ([address] )    Pressure injury of right hip, unstageable (HCC)    Alteration in nutrition    Peritoneal dialysis catheter in place [facility])    Arterial leg ulcer ([place] [address] )     Past Medical History:   Diagnosis Date    Anemia due to chronic kidney disease     Atherosclerotic heart disease of native coronary artery without angina pectoris     Cardiac pacemaker     Chronic kidney disease     Dependence on renal dialysis ([place] [address] )     Depression     Diabetes mellitus ([place] [address] )     Dysphagia     End stage renal disease [facility])     GERD (gastroesophageal reflux disease)     Heart failure ([facility])     Hyperlipidemia     Hypertension     Hypertensive chronic kidney disease with stage 5 chronic kidney disease or end stage renal disease ([place] [address] )     Hypotension 12/8/2021    Major depressive disorder, recurrent ([facility])     MRSA (methicillin resistant Staphylococcus aureus)     Pressure ulcer of sacral region, unstageable ([place] [address] )     Psychiatric disorder     anxiety    Stroke ([place] [address] )     Type 2 diabetes mellitus with chronic kidney disease ([place] [address] )     Type 2 diabetes mellitus with diabetic nephropathy ([facility])     Type 2 diabetes mellitus with diabetic peripheral angiopathy without gangrene ([place] [address] )     Type 2 diabetes mellitus with hyperglycemia ([facility])     Type 2 diabetes mellitus with hypoglycemia ([facility])     Unspecified Escherichia coli (E  coli) as the cause of diseases classified elsewhere      Past Surgical History:   Procedure Laterality Date    INSERT / REPLACE / REMOVE PACEMAKER      IR TUNNELED CENTRAL LINE CHECK/CHANGE/REPOSITION  1/3/2022    IR TUNNELED CENTRAL LINE CHECK/CHANGE/REPOSITION  1/31/2022    IR TUNNELED DIALYSIS CATHETER PLACEMENT  12/10/2021     Social History     Socioeconomic History    Marital status:       Spouse name: None    Number of children: None    Years of education: None    Highest education level: None   Occupational History    None   Tobacco Use    Smoking status: Unknown If Ever Smoked    Smokeless tobacco: Never Used   Substance and Sexual Activity    Alcohol use: Not Currently    Drug use: Not Currently    Sexual activity: Not Currently   Other Topics Concern    None   Social History Narrative    None     Social Determinants of Health     Financial Resource Strain: Not on file   Food Insecurity: No Food Insecurity    Worried About Running Out of Food in the Last Year: Never true    Kiran of Food in the Last Year: Never true   Transportation Needs: No Transportation Needs    Lack of Transportation (Medical): No    Lack of Transportation (Non-Medical):  No   Physical Activity: Not on file   Stress: Not on file   Social Connections: Not on file   Intimate Partner Violence: Not on file   Housing Stability: Unknown    Unable to Pay for Housing in the Last Year: No    Number of Places Lived in the Last Year: Not on file    Unstable Housing in the Last Year: No        Current Outpatient Medications:     Acetaminophen 325 MG CAPS, Take 650 mg by mouth every 6 (six) hours as needed, Disp: , Rfl:     aspirin (ECOTRIN LOW STRENGTH) 81 mg EC tablet, Take 81 mg by mouth daily, Disp: , Rfl:     atorvastatin (LIPITOR) 10 mg tablet, Take 1 tablet (10 mg total) by mouth daily, Disp: 30 tablet, Rfl: 0    B Complex-C-Folic Acid (NEPHRO VITAMINS PO), Take 1 tablet by mouth daily, Disp: , Rfl:     calcitriol (ROCALTROL) 0 25 mcg capsule, Take 0 25 mcg by mouth daily (Patient not taking: Reported on 1/17/2022 ), Disp: , Rfl:     cholecalciferol (VITAMIN D3) 1,000 units tablet, Take 2,000 Units by mouth daily, Disp: , Rfl:     cholestyramine sugar free (QUESTRAN LIGHT) 4 g packet, Take 1 packet (4 g total) by mouth 2 (two) times a day, Disp: 60 packet, Rfl: 0    clopidogrel (PLAVIX) 75 mg tablet, Take 75 mg by mouth daily, Disp: , Rfl:     docusate sodium (COLACE) 100 mg capsule, Take 100 mg by mouth 2 (two) times a day, Disp: , Rfl:     esomeprazole (NexIUM) 40 MG capsule, Take 40 mg by mouth every morning before breakfast, Disp: , Rfl:     gentamicin (GARAMYCIN) 0 1 % cream, Apply 1 application topically daily, Disp: 15 g, Rfl: 0    glucagon (GLUCAGEN) 1 mg injection, Inject 1 mg into a muscle once As needed for low blood sugar, Disp: , Rfl:     insulin glargine (LANTUS) 100 units/mL subcutaneous injection, Inject 5 Units under the skin daily at bedtime, Disp: , Rfl:     insulin lispro (HumaLOG) 100 units/mL injection, Inject under the skin Sliding scale coverage  , Disp: , Rfl:     latanoprost (XALATAN) 0 005 % ophthalmic solution, Administer 1 drop to both eyes daily at bedtime, Disp: , Rfl:     metoprolol tartrate (LOPRESSOR) 25 mg tablet, Take 25 mg by mouth daily, Disp: , Rfl:     midodrine (PROAMATINE) 5 mg tablet, Take 5 mg by mouth 3 (three) times a week 1 tablet Daily on Tue,Thus,Sat prior to HD, Disp: , Rfl:     Nutritional Supplements (Alon) POWD, Take by mouth, Disp: , Rfl:     nystatin (MYCOSTATIN) cream, Apply 1 application topically daily To buttocks (Patient not taking: Reported on 1/17/2022 ), Disp: , Rfl:     ondansetron (ZOFRAN) 4 mg tablet, Take 4 mg by mouth every 6 (six) hours as needed for nausea or vomiting, Disp: , Rfl:     perphenazine 4 mg tablet, Take 4 mg by mouth 3 (three) times a day With meals, Disp: , Rfl:     sacubitril-valsartan (ENTRESTO) 24-26 MG TABS, Take 1 tablet by mouth 2 (two) times a day Hold morning of dialysis days, Disp: 60 tablet, Rfl: 0    sertraline (ZOLOFT) 25 mg tablet, Take 25 mg by mouth daily, Disp: , Rfl:     Sevelamer Carbonate 0 8 g PACK, Take 1 Package by mouth 3 (three) times a day 1 Packet with meals TID, Disp: , Rfl:   History reviewed  No pertinent family history  Coordination of Care: Wound team aware of the treatment plan    Patient / Staff education :  Staff was given education on sign of infection and pressure ulcer prevention    Staff verbalized understanding     Follow up :  Return in about 1 week (around 2/23/2022)  Voice-recognition software may have been used in the preparation of this document  Occasional wrong word or "sound-alike" substitutions may have occurred due to the inherent limitations of voice recognition software  Interpretation should be guided by context        SANTINO Gutierrez

## 2022-02-18 NOTE — PROGRESS NOTES
The patient was scheduled for removal of a peritoneal dialysis catheter today  Apparently the son and the daughter-in-law who are the next of kin were supposed to accompany the patient to sign the consent forms for surgery and anesthesia  However we are unable to contact the family  There has been a death in the family, hence the family is not in the country at present time  In view of absence of consent the procedure has to be postponed

## 2022-02-20 NOTE — TELEPHONE ENCOUNTER
Met with patient to assess for any transplant education needs at this time and to see how he has been doing since transplant. He stated that no education was necessary, is able to do his pillbox on his own. Informed him I am available if needed.        Resident has an order for a new medication, pharmacy will not be able to deliver tonight   Requesting ok to start tomorrow    On call provider was paged

## 2022-02-23 PROBLEM — L89.220 PRESSURE INJURY OF LEFT HIP, UNSTAGEABLE (HCC): Status: ACTIVE | Noted: 2022-01-01

## 2022-02-23 PROBLEM — T14.8XXA DEEP TISSUE INJURY: Status: RESOLVED | Noted: 2022-01-01 | Resolved: 2022-01-01

## 2022-02-23 PROBLEM — L97.909 ARTERIAL LEG ULCER (HCC): Status: ACTIVE | Noted: 2022-01-01

## 2022-02-23 NOTE — ASSESSMENT & PLAN NOTE
Several wounds on BLE, started as DTI     Some wounds is covered with eschar  All wounds are stable   Plan :  Local wound care with hydraguard  Offload with special mattress  Use heel boots at all times when in bed  Increased protein intake  Continue to monitor for any worsening  Refer to podiatrist  Followup next week

## 2022-02-23 NOTE — ASSESSMENT & PLAN NOTE
Left medial ankle  - wound size   - increase, 2 x 3 cm  , compared to last week measurement of - 1 5 x 1 5 x 0 1, no obvious sign of infection  - local wound care with hydraguard  - offload, use heel boots  - followup next week

## 2022-02-23 NOTE — LETTER
Patient:  Hayder Keith   1950           SANTINO Gomez saw Hayder Keith for a wound care visit on 2/23/2022  See below for information relating to this visit  Chief Complaint   Patient presents with    Follow Up Wound Care Visit     multiple wounds        Assessment/Plan:  1  Pressure injury of sacral region, unstageable [facility])  Assessment & Plan:  Sacrum  - Wound size increase, 1 1 x 0 8 x 0 1 cm , compared to last week measurement  1 x 0 6 x 0 1 cm  ,   -  100% slough, no obvious sign of infection  - local wound care  - changed to Santyl for enzymatic debridement  - offload  - follow up next week    Orders:  -     Wound cleansing and dressings; Future    2  Pressure injury of left ankle, stage 2 (HCC)  Assessment & Plan:  Left medial ankle  - wound size   - increase, 2 x 3 cm  , compared to last week measurement of - 1 5 x 1 5 x 0 1, no obvious sign of infection  - local wound care with hydraguard  - offload, use heel boots  - followup next week    Orders:  -     Wound cleansing and dressings; Future    3  Unstageable pressure ulcer of right hip (HCC)  Assessment & Plan:  Wound previously healed, reopened  - wound size is 0 6 x 0 3 x 0 1 cm  , 100% slough, with no obvious sign of infection  - local wound care with Santyl for enzymatic debridement  - continued offload  - followup next week    Orders:  -     Wound cleansing and dressings; Future    4  Ambulatory dysfunction  Assessment & Plan:  - on 24/7 restorative care      5  Pressure injury of left hip, unstageable (HCC)  Assessment & Plan:  Wound previously healed, reopened  - wound size is 0 6 x 0 3 x 0 1 cm  , 100% slough, with no obvious sign of infection  - local wound care with Santyl for enzymatic debridement  - continued offload  - followup next week    Orders:  -     Wound cleansing and dressings; Future    6  Arterial leg ulcer ([address] )  Assessment & Plan:  Several wounds on BLE, started as DTI     Some wounds is covered with eschar  All wounds are stable   Plan :  Local wound care with hydraguard  Offload with special mattress  Use heel boots at all times when in bed  Increased protein intake  Continue to monitor for any worsening  Refer to podiatrist  Followup next week    Orders:  -     Wound cleansing and dressings; Future           Orders:  Ruth Vu  1950  Orders Placed This Encounter   Procedures    Wound cleansing and dressings     Wound:  Sacrum/ right hip/ Left hip  Discontinue previous wound order  Cleanse the wound bed with NSS   Apply non-sting skin prep to periwound area  Apply santyl collagenase to wound bed, then cover with bordered foam   Frequency : daily and prn for soiling     Left medial ankle, left bunion medial, left great toe, right foot 4th toe,   Left lateral foot,   Cleanse the wound bed with NSS  Apply hydraguard to wound bed  Daily and prn for soiling     Refer to podiatrist  Prevalon boots when in bed  Air mattress  Offload all wounds  Turn and reposition frequently, maximum of every two hours  Instruct / Assist with weight shifting every 15 - 20 minutes when in chair  Increase protein intake  Monitor for any sign of infection or worsening, inform PCP or patient's primary physician in your facility     Standing Status:   Future     Standing Expiration Date:   2/23/2023         Follow Up:  Return in about 1 week (around 3/2/2022)  [address] hours are 8:00 am - 4:30 pm Monday through Friday  The center phone number is [phone]  You can also contact me directly thru my email at [facility]  [email or web address]  [email or web address] or thru tiger text  If it is an emergency, please contact the PCP or patient's attending physician in your facility       Sincerely,    Electronically signed by SANTINO Raymond    Patient : Ruth Vu    1950

## 2022-02-23 NOTE — ASSESSMENT & PLAN NOTE
Sacrum  - Wound size increase, 1 1 x 0 8 x 0 1 cm , compared to last week measurement  1 x 0 6 x 0 1 cm  ,   -  100% slough, no obvious sign of infection  - local wound care  - changed to Santyl for enzymatic debridement  - offload  - follow up next week

## 2022-02-23 NOTE — PATIENT INSTRUCTIONS
Orders Placed This Encounter   Procedures    Wound cleansing and dressings     Wound:  Sacrum/ right hip/ Left hip  Discontinue previous wound order  Cleanse the wound bed with NSS   Apply non-sting skin prep to periwound area  Apply santyl collagenase to wound bed, then cover with bordered foam   Frequency : daily and prn for soiling     Left medial ankle, left bunion medial, left great toe, right foot 4th toe,   Left lateral foot,   Cleanse the wound bed with NSS  Apply hydraguard to wound bed  Daily and prn for soiling     Refer to podiatrist  Prevalon boots when in bed  Air mattress  Offload all wounds  Turn and reposition frequently, maximum of every two hours  Instruct / Assist with weight shifting every 15 - 20 minutes when in chair  Increase protein intake    Monitor for any sign of infection or worsening, inform PCP or patient's primary physician in your facility     Standing Status:   Future     Standing Expiration Date:   2/23/2023

## 2022-02-23 NOTE — ASSESSMENT & PLAN NOTE
Wound previously healed, reopened  - wound size is 0 6 x 0 3 x 0 1 cm  , 100% slough, with no obvious sign of infection  - local wound care with Santyl for enzymatic debridement  - continued offload  - followup next week

## 2022-02-25 NOTE — PROGRESS NOTES
[address] [facility]NAGEMENT   AND [facility]PER[facility]       Patient ID: Romana Jewett is a 73 y o  female Date of Birth 1950     Location of Service: [address] [place] [address]    Performed wound round with: Wound team       Chief Complaint   Patient presents with    Follow Up Wound Care Visit     multiple wounds       Wound Instructions:  Orders Placed This Encounter   Procedures    Wound cleansing and dressings     Wound:  Sacrum/ right hip/ Left hip  Discontinue previous wound order  Cleanse the wound bed with NSS   Apply non-sting skin prep to periwound area  Apply santyl collagenase to wound bed, then cover with bordered foam   Frequency : daily and prn for soiling     Left medial ankle, left bunion medial, left great toe, right foot 4th toe,   Left lateral foot,   Cleanse the wound bed with NSS  Apply hydraguard to wound bed  Daily and prn for soiling     Refer to podiatrist  Prevalon boots when in bed  Air mattress  Offload all wounds  Turn and reposition frequently, maximum of every two hours  Instruct / Assist with weight shifting every 15 - 20 minutes when in chair  Increase protein intake  Monitor for any sign of infection or worsening, inform PCP or patient's primary physician in your facility     Standing Status:   Future     Standing Expiration Date:   2/23/2023    Debridement     This order was created via procedure documentation       Allergies  Penicillins      Assessment & Plan:  1  Press[facility] sacral [facility]able [facility]  Assessment & Plan:  Sacrum  - Wound size increase, 1 1 x 0 8 x 0 1 cm , compared to last week measurement  1 x 0 6 x 0 1 cm  ,   -  100% slough, no obvious sign of infection  - local wound care  - changed to Santyl for enzymatic debridement  - offload  - follow up next week    Orders:  -     Wound cleansing and dressings; Future    2   Pressure injury of left ankle, stage 2 ([facility])  Assessment & Plan:  Left medial ankle  - wound size   - increase, 2 x 3 cm  , compared to last week measurement of - 1 5 x 1 5 x 0 1, no obvious sign of infection  - local wound care with hydraguard  - offload, use heel boots  - followup next week    Orders:  -     Wound cleansing and dressings; Future    3  Unstageable pressure ulcer of right hip (HCC)  Assessment & Plan:  Wound previously healed, reopened  - wound size is 0 6 x 0 3 x 0 1 cm  , 100% slough, with no obvious sign of infection  - local wound care with Santyl for enzymatic debridement  - continued offload  - followup next week    Orders:  -     Wound cleansing and dressings; Future    4  Ambulatory dysfunction  Assessment & Plan:  - on 24/7 restorative care      5  Pressure injury of left hip, unstageable (HCC)  Assessment & Plan:  Wound previously healed, reopened  - wound size is 0 6 x 0 3 x 0 1 cm  , 100% slough, with no obvious sign of infection  - local wound care with Santyl for enzymatic debridement  - continued offload  - followup next week    Orders:  -     Wound cleansing and dressings; Future    6  Arterial leg ulcer ([facility] )  Assessment & Plan:  Several wounds on BLE, started as DTI  Some wounds is covered with eschar  All wounds are stable   Plan :  Local wound care with hydraguard  Offload with special mattress  Use heel boots at all times when in bed  Increased protein intake  Continue to monitor for any worsening  Refer to podiatrist  Followup next week    Orders:  -     Wound cleansing and dressings; Future           Subjective: Follow up  for wounds on the right buttock, left buttock, sacrum and BLE  Patient have complex medical history including but not limited to  ESRD of PD with recent transition to HD, CVA, Pacemaker, DM II, HTN, Depression, Ambulatory dysfunction  Patient was referred by Senior care team     Received patient in bed, seems comfortable  Patient is incontinent of bowel and bladder  As per medical record review, patient have a poor appetite   She consumed an average of 25% of most of her meals  Under the care of RD  Facility staff did not report any new wounds  Review of Systems   Constitutional: Negative  HENT: Negative  Eyes: Negative  Respiratory: Negative  Cardiovascular: Negative for leg swelling  Gastrointestinal: Negative  Endocrine: Negative  Genitourinary: Negative  Musculoskeletal: Positive for gait problem  Skin: Positive for wound  See hPI   Neurological: Negative for dizziness and headaches  Psychiatric/Behavioral: Negative for behavioral problems  Objective: There were no vitals taken for this visit  Physical Exam  Constitutional:       Appearance: She is obese  HENT:      Head: Normocephalic  Nose: Nose normal       Mouth/Throat:      Mouth: Mucous membranes are moist    Eyes:      Conjunctiva/sclera: Conjunctivae normal    Pulmonary:      Effort: Pulmonary effort is normal    Abdominal:      Tenderness: There is no abdominal tenderness  There is no guarding  Genitourinary:     Comments: With incontinent episode  Musculoskeletal:         General: No tenderness  Cervical back: Normal range of motion  Right lower leg: No edema  Left lower leg: No edema  Comments: LROM   Skin:     General: Skin is warm  Findings: Lesion present        Comments: Left hip - wound size is 0 6 x 0 3 x 0 1 cm  , 100% slough, no drainage, no obvious sign of infection, no malodor, periwound is normal, denies pain    Right hip - wound size is 2 4 x 1 5 x 0 1 cm , 100% slough, with small amount of serous drainage, no malodor, periwound is normal, with no obvious sign of infection    Left lateral foot distal - wound size is 2 x 1 cm , purple,non blanchable    Left lateral middle - wound size is 0 6 x 0 6 x 0 1 cm , purple, non-blanchable,     Left lateral proximal - wound size is 0 6 x 0 6 x 0 1 cm , purple, non blanchable    Left lateral ankle  - wound size is 1 x 1 x 0 1 cm, 100% purple, non-blanchable, no obvious sign of infection  Right 4th toe - wound size is 0 1 x 0 1 cm , 100% eschar, with no obvious sign of infection, stable    Left medial ankle - wound size is 2 x 3 cm , 100% eschar, stable, with no obvious sign of infection, no drainage, no wound malodor    Left bunion - wound size is 2 5 x 1 6 cm , 100% eschar, with no obvious sign of infection, stable no drainage    Sacrum - wound size is 1 1 x 0 8 x 0 1 cm  , 100% slough, with no drainage, with no obvious sign of infection, denies pain, periwound is normal, no undermining, no tunneling, no malodor    Left great toe - wound size is 1 5 x 1 x 0 1 cm  , 100% eschar, stable, with no obvious sign of infection   Neurological:      Mental Status: She is alert  Gait: Gait abnormal    Psychiatric:         Mood and Affect: Mood normal          Thought Content: Thought content normal               Debridement   Universal Protocol:  Risks and benefits: risks, benefits and alternatives were discussed  Consent given by: patient (health care rep)  Time out: Immediately prior to procedure a "time out" was called to verify the correct patient, procedure, equipment, support staff and site/side marked as required  Timeout called at: 2/23/2022 9:30 PM   Patient understanding: patient states understanding of the procedure being performed  Patient identity confirmed: verbally with patient      Performed by: NP (right hip)  Debridement type: selective  Pain control: lidocaine 4%  Post-debridement measurements  Length (cm): 2 4  Width (cm): 1 5  Depth (cm): 0 1  Percent debrided: 100%  Surface Area (cm^2): 3 6  Area debrided (cm^2): 3 6  Volume (cm^3): 0 36  Devitalized tissue debrided: biofilm, fibrin and slough  Instrument(s) utilized: blade  Bleeding: small  Hemostasis obtained with: pressure  Procedural pain (0-10): 0  Post-procedural pain: 0   Response to treatment: procedure was tolerated well                 Patient's care was coordinated with nursing facility staff   Recent vitals, labs and updated medications were reviewed on EMR or chart system of facility   Past Medical, surgical, social, medication and allergy history and patient's previous records were reviewed and updated as appropriate:     Patient Active Problem List   Diagnosis    HHNC (hyperglycemic hyperosmolar nonketotic coma) ([place] [address] )    ESRD (end stage renal disease) on dialysis ([place] [address] )    Type 2 diabetes mellitus ([place] [address] )    Depression    GERD (gastroesophageal reflux disease)    HLD (hyperlipidemia)    Pacemaker    HTN (hypertension)    Hypernatremia    Encephalopathy    Hypotension    CVA (cerebral vascular accident) ([place] [address] )    CAD (coronary artery disease)    Ischemic cardiomyopathy    Stroke-like symptoms    Abnormal CT of the chest    Ambulatory dysfunction    Dysphagia    [facility] of right buttock, stage 3 ([facility])    Pressure injury of sacral region, unstageable ([place] [address] )    UTI (urinary tract infection)    Elevated troponin    Diarrhea    Neuropathy of both feet    Hypercalcemia    Pressure injury of left ankle, stage 2 (HCC)    Pressure injury of left hip, unstageable ([facility])    Alteration in nutrition    Peritoneal dialysis catheter in place [facility])    Arterial leg ulcer ([place] [address] )     Past Medical History:   Diagnosis Date    Anemia due to chronic kidney disease     Atherosclerotic heart disease of native coronary artery without angina pectoris     Cardiac pacemaker     Chronic kidney disease     Dependence on renal dialysis ([place] [address] )     Depression     Diabetes mellitus ([place] [address] )     Dysphagia     End stage renal disease ([place] [address] )     GERD (gastroesophageal reflux disease)     Heart failure ([facility])     Hyperlipidemia     Hypertension     Hypertensive chronic kidney disease with stage 5 chronic kidney disease or end stage renal disease ([place] [address] )     Hypotension 12/8/2021    Major depressive disorder, recurrent ([facility])     MRSA (methicillin resistant Staphylococcus aureus)     Pressure ulcer of sacral region, unstageable [facility])     Psychiatric disorder     anxiety    Stroke ([facility] )     Type 2 diabetes mellitus with chronic kidney disease ([place] [address] )     Type 2 diabetes mellitus with diabetic nephropathy (HCC)     Type 2 diabetes mellitus with diabetic peripheral angiopathy without gangrene ([facility] )     Type 2 diabetes mellitus with hyperglycemia (HCC)     Type 2 diabetes mellitus with hypoglycemia (HCC)     Unspecified Escherichia coli (E  coli) as the cause of diseases classified elsewhere      Past Surgical History:   Procedure Laterality Date    INSERT / REPLACE / REMOVE PACEMAKER      IR TUNNELED CENTRAL LINE CHECK/CHANGE/REPOSITION  1/3/2022    IR TUNNELED CENTRAL LINE CHECK/CHANGE/REPOSITION  1/31/2022    IR TUNNELED DIALYSIS CATHETER PLACEMENT  12/10/2021     Social History     Socioeconomic History    Marital status:      Spouse name: None    Number of children: None    Years of education: None    Highest education level: None   Occupational History    None   Tobacco Use    Smoking status: Unknown If Ever Smoked    Smokeless tobacco: Never Used   Substance and Sexual Activity    Alcohol use: Not Currently    Drug use: Not Currently    Sexual activity: Not Currently   Other Topics Concern    None   Social History Narrative    None     Social Determinants of Health     Financial Resource Strain: Not on file   Food Insecurity: No Food Insecurity    Worried About Running Out of Food in the Last Year: Never true    Kiran of Food in the Last Year: Never true   Transportation Needs: No Transportation Needs    Lack of Transportation (Medical): No    Lack of Transportation (Non-Medical):  No   Physical Activity: Not on file   Stress: Not on file   Social Connections: Not on file   Intimate Partner Violence: Not on file   Housing Stability: Unknown    Unable to Pay for Housing in the Last Year: No    Number of Places Lived in the Last Year: Not on file    Unstable Housing in the Last Year: No        Current Outpatient Medications:     Acetaminophen 325 MG CAPS, Take 650 mg by mouth every 6 (six) hours as needed, Disp: , Rfl:     aspirin (ECOTRIN LOW STRENGTH) 81 mg EC tablet, Take 81 mg by mouth daily, Disp: , Rfl:     atorvastatin (LIPITOR) 10 mg tablet, Take 1 tablet (10 mg total) by mouth daily, Disp: 30 tablet, Rfl: 0    B Complex-C-Folic Acid (NEPHRO VITAMINS PO), Take 1 tablet by mouth daily, Disp: , Rfl:     calcitriol (ROCALTROL) 0 25 mcg capsule, Take 0 25 mcg by mouth daily (Patient not taking: Reported on 1/17/2022 ), Disp: , Rfl:     cholecalciferol (VITAMIN D3) 1,000 units tablet, Take 2,000 Units by mouth daily, Disp: , Rfl:     cholestyramine sugar free (QUESTRAN LIGHT) 4 g packet, Take 1 packet (4 g total) by mouth 2 (two) times a day, Disp: 60 packet, Rfl: 0    clopidogrel (PLAVIX) 75 mg tablet, Take 75 mg by mouth daily, Disp: , Rfl:     docusate sodium (COLACE) 100 mg capsule, Take 100 mg by mouth 2 (two) times a day, Disp: , Rfl:     esomeprazole (NexIUM) 40 MG capsule, Take 40 mg by mouth every morning before breakfast, Disp: , Rfl:     gentamicin (GARAMYCIN) 0 1 % cream, Apply 1 application topically daily, Disp: 15 g, Rfl: 0    glucagon (GLUCAGEN) 1 mg injection, Inject 1 mg into a muscle once As needed for low blood sugar, Disp: , Rfl:     insulin glargine (LANTUS) 100 units/mL subcutaneous injection, Inject 5 Units under the skin daily at bedtime, Disp: , Rfl:     insulin lispro (HumaLOG) 100 units/mL injection, Inject under the skin Sliding scale coverage  , Disp: , Rfl:     latanoprost (XALATAN) 0 005 % ophthalmic solution, Administer 1 drop to both eyes daily at bedtime, Disp: , Rfl:     metoprolol tartrate (LOPRESSOR) 25 mg tablet, Take 25 mg by mouth daily, Disp: , Rfl:     midodrine (PROAMATINE) 5 mg tablet, Take 5 mg by mouth 3 (three) times a week 1 tablet Daily on Tue,Thus,Sat prior to HD, Disp: , Rfl:     Nutritional Supplements (Alon) POWD, Take by mouth, Disp: , Rfl:     nystatin (MYCOSTATIN) cream, Apply 1 application topically daily To buttocks (Patient not taking: Reported on 1/17/2022 ), Disp: , Rfl:     ondansetron (ZOFRAN) 4 mg tablet, Take 4 mg by mouth every 6 (six) hours as needed for nausea or vomiting, Disp: , Rfl:     perphenazine 4 mg tablet, Take 4 mg by mouth 3 (three) times a day With meals, Disp: , Rfl:     sacubitril-valsartan (ENTRESTO) 24-26 MG TABS, Take 1 tablet by mouth 2 (two) times a day Hold morning of dialysis days, Disp: 60 tablet, Rfl: 0    sertraline (ZOLOFT) 25 mg tablet, Take 25 mg by mouth daily, Disp: , Rfl:     Sevelamer Carbonate 0 8 g PACK, Take 1 Package by mouth 3 (three) times a day 1 Packet with meals TID, Disp: , Rfl:   History reviewed  No pertinent family history  Coordination of Care: Wound team aware of the treatment plan  Facility nurse will provide treatment and monitor the wound  Patient / Staff education :  Staff was given education on sign of infection and pressure ulcer prevention  Staff verbalized understanding     Follow up :  Return in about 1 week (around 3/2/2022)  Voice-recognition software may have been used in the preparation of this document  Occasional wrong word or "sound-alike" substitutions may have occurred due to the inherent limitations of voice recognition software  Interpretation should be guided by context        SANTINO Vasquez

## 2022-03-02 PROBLEM — Z51.5 HOSPICE CARE: Status: ACTIVE | Noted: 2022-01-01

## 2022-03-02 NOTE — PATIENT INSTRUCTIONS
Orders Placed This Encounter   Procedures    Wound cleansing and dressings     Wound:  Sacrum/ right hip  Discontinue previous wound order  Cleanse the wound bed with NSS   Apply non-sting skin prep to periwound area  Apply santyl collagenase to wound bed, then cover with bordered foam   Frequency : daily and prn for soiling     Left medial ankle, left bunion medial, left great toe, right foot 4th toe,   Left lateral foot,   Cleanse the wound bed with NSS  Apply hydraguard to wound bed  Daily and prn for soiling     Refer to podiatrist  Prevalon boots when in bed  Air mattress  Offload all wounds  Turn and reposition frequently, maximum of every two hours  Instruct / Assist with weight shifting every 15 - 20 minutes when in chair  Increase protein intake    Monitor for any sign of infection or worsening, inform PCP or patient's primary physician in your facility     Standing Status:   Future     Standing Expiration Date:   3/2/2023

## 2022-03-02 NOTE — ASSESSMENT & PLAN NOTE
Left medial ankle  - wound size   - stable, 100% eschar  - local wound care with hydraguard  - offload, use heel boots  - sign off

## 2022-03-02 NOTE — LETTER
Patient:  Deirdre Quintero   1950           SANTINO Vasquez saw Deirdre Quintero for a wound care visit on 3/2/2022  See below for information relating to this visit  Chief Complaint   Patient presents with    Follow Up Wound Care Visit     multiple wounds        Assessment/Plan:  1  Pressure injury of sacral region, unstageable [facility])  Assessment & Plan:  Sacrum  - Wound size  decrease, 1 x 0 6 x 0 1 cm  , compared to last week measurement of  1 1 x 0 8 x 0 1 cm ,   -  decrease slough, no obvious sign of infection  - local wound care  - changed to Santyl for enzymatic debridement  - offload      Orders:  -     Wound cleansing and dressings; Future    2  Pressure injury of left ankle, stage 2 (HCC)  Assessment & Plan:  Left medial ankle  - wound size   - stable, 100% eschar  - local wound care with hydraguard  - offload, use heel boots  - sign off    Orders:  -     Wound cleansing and dressings; Future    3  Unstageable pressure ulcer of right hip ([facility])  Assessment & Plan:  Wound previously healed, reopened  - healed    Orders:  -     Wound cleansing and dressings; Future    4  Ambulatory dysfunction  Assessment & Plan:  On hospice care      5  Pressure injury of left hip, unstageable ([facility] [address] )  Assessment & Plan:  Wound previously healed, reopened  - healed    Orders:  -     Wound cleansing and dressings; Future    6  Arterial leg ulcer ([address] )  Assessment & Plan:  Several wounds on BLE, started as DTI  Some wounds is covered with eschar  All wounds are stable   Plan :  Local wound care with hydraguard  Offload with special mattress  Use heel boots at all times when in bed  Increased protein intake  Continue to monitor for any worsening    Orders:  -     Wound cleansing and dressings; Future    7  Hospice care  Assessment & Plan:  Palliative care plan was chosen  Based on this plan, there is no expectation of healing   The end goals of our palliative care plan are pain control, drainage management, prevention of infection, odor control, and optimization of quality of life insofar as the wound will allow  Therefore, invasive procedures related to the wound will be minimized  Dressings will be chosen to achieve the palliative care plan goals rather that to achieve healing of the wound  Debridements may be performed periodically in order to prevent infection or control odor, etc     - Sign off  [facility] will continue management of all the patient's wound  Orders:  Zaina Ford  1950  Orders Placed This Encounter   Procedures    Wound cleansing and dressings     Wound:  Sacrum/ right hip  Discontinue previous wound order  Cleanse the wound bed with NSS   Apply non-sting skin prep to periwound area  Apply santyl collagenase to wound bed, then cover with bordered foam   Frequency : daily and prn for soiling     Left medial ankle, left bunion medial, left great toe, right foot 4th toe,   Left lateral foot,   Cleanse the wound bed with NSS  Apply hydraguard to wound bed  Daily and prn for soiling     Refer to podiatrist  Prevalon boots when in bed  Air mattress  Offload all wounds  Turn and reposition frequently, maximum of every two hours  Instruct / Assist with weight shifting every 15 - 20 minutes when in chair  Increase protein intake  Monitor for any sign of infection or worsening, inform PCP or patient's primary physician in your facility     Standing Status:   Future     Standing Expiration Date:   3/2/2023         Follow Up:  Return sign off  [facility] hours are 8:00 am - 4:30 pm Monday through Friday  The center phone number is [phone]  You can also contact me directly thru my email at [facility]  [email or web address] or thru tiger text  If it is an emergency, please contact the PCP or patient's attending physician in your facility       Sincerely,    Electronically signed by SANTINO Harrington    Patient : Zaina Ford 1950

## 2022-03-02 NOTE — ASSESSMENT & PLAN NOTE
Palliative care plan was chosen  Based on this plan, there is no expectation of healing  The end goals of our palliative care plan are pain control, drainage management, prevention of infection, odor control, and optimization of quality of life insofar as the wound will allow  Therefore, invasive procedures related to the wound will be minimized  Dressings will be chosen to achieve the palliative care plan goals rather that to achieve healing of the wound  Debridements may be performed periodically in order to prevent infection or control odor, etc     - Sign off  [facility] will continue management of all the patient's wound

## 2022-03-02 NOTE — ASSESSMENT & PLAN NOTE
Sacrum  - Wound size  decrease, 1 x 0 6 x 0 1 cm  , compared to last week measurement of  1 1 x 0 8 x 0 1 cm ,   -  decrease slough, no obvious sign of infection  - local wound care  - changed to Santyl for enzymatic debridement  - offload

## 2022-03-02 NOTE — ASSESSMENT & PLAN NOTE
Several wounds on BLE, started as DTI     Some wounds is covered with eschar  All wounds are stable   Plan :  Local wound care with hydraguard  Offload with special mattress  Use heel boots at all times when in bed  Increased protein intake  Continue to monitor for any worsening

## 2022-03-04 NOTE — PROGRESS NOTES
[address] MANAGEMENT   AND HYPERBARIC MEDICINE CENTER       Patient ID: Ashley Hugo is a 73 y o  female Date of Birth 1950     Location of Service: [facility]    Performed wound round with: Wound team       Chief Complaint   Patient presents with    Follow Up Wound Care Visit     multiple wounds       Wound Instructions:  Orders Placed This Encounter   Procedures    Wound cleansing and dressings     Wound:  Sacrum/ right hip  Discontinue previous wound order  Cleanse the wound bed with NSS   Apply non-sting skin prep to periwound area  Apply santyl collagenase to wound bed, then cover with bordered foam   Frequency : daily and prn for soiling     Left medial ankle, left bunion medial, left great toe, right foot 4th toe,   Left lateral foot,   Cleanse the wound bed with NSS  Apply hydraguard to wound bed  Daily and prn for soiling     Refer to podiatrist  Prevalon boots when in bed  Air mattress  Offload all wounds  Turn and reposition frequently, maximum of every two hours  Instruct / Assist with weight shifting every 15 - 20 minutes when in chair  Increase protein intake  Monitor for any sign of infection or worsening, inform PCP or patient's primary physician in your facility     Standing Status:   Future     Standing Expiration Date:   3/2/2023       Allergies  Penicillins      Assessment & Plan:  1  Pressure injury of sacral region, unstageable [facility])  Assessment & Plan:  Sacrum  - Wound size  decrease, 1 x 0 6 x 0 1 cm  , compared to last week measurement of  1 1 x 0 8 x 0 1 cm ,   -  decrease slough, no obvious sign of infection  - local wound care  - changed to Santyl for enzymatic debridement  - offload      Orders:  -     Wound cleansing and dressings; Future    2   Pressure injury of left ankle, stage 2 ([facility])  Assessment & Plan:  Left medial ankle  - wound size   - stable, 100% eschar  - local wound care with hydraguard  - offload, use heel boots  - sign off    Orders:  -     Wound cleansing and dressings; Future    3  Unstageable pressure ulcer of right hip (HCC)  Assessment & Plan:  Wound previously healed, reopened  - healed    Orders:  -     Wound cleansing and dressings; Future    4  Ambulatory dysfunction  Assessment & Plan:  On hospice care      5  Pressure injury of left hip, unstageable ([facility] [address] )  Assessment & Plan:  Wound previously healed, reopened  - healed    Orders:  -     Wound cleansing and dressings; Future    6  Arterial leg ulcer ([facility] [address] )  Assessment & Plan:  Several wounds on BLE, started as DTI  Some wounds is covered with eschar  All wounds are stable   Plan :  Local wound care with hydraguard  Offload with special mattress  Use heel boots at all times when in bed  Increased protein intake  Continue to monitor for any worsening    Orders:  -     Wound cleansing and dressings; Future    7  Hospice care  Assessment & Plan:  Palliative care plan was chosen  Based on this plan, there is no expectation of healing  The end goals of our palliative care plan are pain control, drainage management, prevention of infection, odor control, and optimization of quality of life insofar as the wound will allow  Therefore, invasive procedures related to the wound will be minimized  Dressings will be chosen to achieve the palliative care plan goals rather that to achieve healing of the wound  Debridements may be performed periodically in order to prevent infection or control odor, etc     - Sign off  [facility] will continue management of all the patient's wound  Subjective: Follow up  for wounds on the right buttock, left buttock, sacrum and BLE  Patient have complex medical history including but not limited to  ESRD of PD with recent transition to HD, CVA, Pacemaker, DM II, HTN, Depression, Ambulatory dysfunction  Patient was referred by Senior care team     Received patient in bed, seems comfortable  Patient is incontinent of bowel and bladder   As per medical record review, patient have a poor appetite and the health is declining  Patient was recently transferred to [facility]  Facility requested that I see the patient even if she is under [facility]  Review of Systems   Constitutional: Negative  HENT: Negative  Eyes: Negative  Respiratory: Negative  Cardiovascular: Negative for leg swelling  Gastrointestinal: Negative  Endocrine: Negative  Genitourinary: Negative  Musculoskeletal: Positive for gait problem  Skin: Positive for wound  See hPI   Neurological: Negative for dizziness and headaches  Psychiatric/Behavioral: Negative for behavioral problems  Objective: There were no vitals taken for this visit  Physical Exam  Constitutional:       Appearance: She is obese  HENT:      Head: Normocephalic  Nose: Nose normal       Mouth/Throat:      Mouth: Mucous membranes are moist    Eyes:      Conjunctiva/sclera: Conjunctivae normal    Pulmonary:      Effort: Pulmonary effort is normal    Abdominal:      Tenderness: There is no abdominal tenderness  There is no guarding  Genitourinary:     Comments: With incontinent episode  Musculoskeletal:         General: No tenderness  Cervical back: Normal range of motion  Right lower leg: No edema  Left lower leg: No edema  Comments: LROM   Skin:     General: Skin is warm  Findings: Lesion present        Comments: Left hip -healed    Right hip - wound size is 2 x 1 4 x 0 1 cm , 100% slough, with small amount of serous drainage, no obvious sign of infection, denies pain    Left lateral foot - wound size is 7 5 x 1 cm , 100% eschar, no drainage, no obvious sign of infection    Right 4th toe - wound size is 0 5 x 0 1 cm , 100% eschar, no drainage, no obvious sign of infection    Left medial ankle - wound size is 3 x 3 cm  , 100% eschar, no drainage, no obvious sign of infection    Left bunion - wound size is 2 5 x 2 1 cm , 100% eschar, no drainage, no obvious sign of infection    Sacrum - wound size is 1 times 0 6 x 0 1 cm  , 50% slough, 50% granulation, with small amount of serous drainage, periwound is normal, no malodor, no obvious sign of infection, denies pain    Left great toe - wound size is 1 x 0 5 x 0 1 cm , 100% eschar, no drainage, periwound is normal, no malodor, no obvious sign of infection    Patient denies pain   Neurological:      Mental Status: She is alert  Gait: Gait abnormal    Psychiatric:         Mood and Affect: Mood normal          Thought Content: Thought content normal               Procedures           Patient's care was coordinated with nursing facility staff  Recent vitals, labs and updated medications were reviewed on EMR or chart system of facility   Past Medical, surgical, social, medication and allergy history and patient's previous records were reviewed and updated as appropriate:     Patient Active Problem List   Diagnosis    HHNC (hyperglycemic hyperosmolar nonketotic coma) ([facility] [address] )    ESRD (end stage renal disease) on dialysis ([facility] [address] )    Type 2 diabetes mellitus ([facility] [address] )    Depression    GERD (gastroesophageal reflux disease)    HLD (hyperlipidemia)    Pacemaker    HTN (hypertension)    Hypernatremia    Encephalopathy    Hypotension    CVA (cerebral vascular accident) ([address] )    CAD (coronary artery disease)    Ischemic cardiomyopathy    Stroke-like symptoms    Abnormal CT of the chest    Ambulatory dysfunction    Dysphagia    Pressure injury of right buttock, stage 3 (HCC)    Pressure injury of sacral region, unstageable ([address] )    UTI (urinary tract infection)    Elevated troponin    Diarrhea    Neuropathy of both feet    Hypercalcemia    Deep tissue injury    Pressure injury of left ankle, stage 2 (HCC)    Pressure injury of left hip, unstageable (HCC)    Alteration in nutrition    Peritoneal dialysis catheter in place [facility])    Arterial leg ulcer ([facility] [address] [facility]    Hospice care     Past Medical History:   Diagnosis Date    Anemia due to chronic kidney disease     Atherosclerotic heart disease of native coronary artery without angina pectoris     Cardiac pacemaker     Chronic kidney disease     Dependence on renal dialysis ([place] [address] )     Depression     Diabetes mellitus [facility]     Dysphagia     End stage renal disease ([facility])     GERD (gastroesophageal reflux disease)     Heart failure ([facility])     Hyperlipidemia     Hypertension     Hypertensive chronic kidney disease with stage 5 chronic kidney disease or end stage renal disease ([place] [address] )     Hypotension 12/8/2021    Major depressive disorder, recurrent ([facility])     MRSA (methicillin resistant Staphylococcus aureus)     Pressure ulcer of sacral region, unstageable ([place] [address] )     Psychiatric disorder     anxiety    Stroke ([place] [address] )     Type 2 diabetes mellitus with chronic kidney disease ([place] [address] )     Type 2 diabetes mellitus with diabetic nephropathy ([facility])     Type 2 diabetes mellitus with diabetic peripheral angiopathy without gangrene ([place] [address] )     Type 2 diabetes mellitus with hyperglycemia ([facility])     Type 2 diabetes mellitus with hypoglycemia ([facility])     Unspecified Escherichia coli (E  coli) as the cause of diseases classified elsewhere      Past Surgical History:   Procedure Laterality Date    INSERT / REPLACE / REMOVE PACEMAKER      IR TUNNELED CENTRAL LINE CHECK/CHANGE/REPOSITION  1/3/2022    IR TUNNELED CENTRAL LINE CHECK/CHANGE/REPOSITION  1/31/2022    IR TUNNELED DIALYSIS CATHETER PLACEMENT  12/10/2021     Social History     Socioeconomic History    Marital status:       Spouse name: None    Number of children: None    Years of education: None    Highest education level: None   Occupational History    None   Tobacco Use    Smoking status: Unknown If Ever Smoked    Smokeless tobacco: Never Used   Substance and Sexual Activity    Alcohol use: Not Currently    Drug use: Not Currently    Sexual activity: Not Currently   Other Topics Concern    None   Social History Narrative    None     Social Determinants of Health     Financial Resource Strain: Not on file   Food Insecurity: No Food Insecurity    Worried About Running Out of Food in the Last Year: Never true    Kiran of Food in the Last Year: Never true   Transportation Needs: No Transportation Needs    Lack of Transportation (Medical): No    Lack of Transportation (Non-Medical):  No   Physical Activity: Not on file   Stress: Not on file   Social Connections: Not on file   Intimate Partner Violence: Not on file   Housing Stability: Unknown    Unable to Pay for Housing in the Last Year: No    Number of Places Lived in the Last Year: Not on file    Unstable Housing in the Last Year: No        Current Outpatient Medications:     Acetaminophen 325 MG CAPS, Take 650 mg by mouth every 6 (six) hours as needed, Disp: , Rfl:     aspirin (ECOTRIN LOW STRENGTH) 81 mg EC tablet, Take 81 mg by mouth daily, Disp: , Rfl:     atorvastatin (LIPITOR) 10 mg tablet, Take 1 tablet (10 mg total) by mouth daily, Disp: 30 tablet, Rfl: 0    B Complex-C-Folic Acid (NEPHRO VITAMINS PO), Take 1 tablet by mouth daily, Disp: , Rfl:     calcitriol (ROCALTROL) 0 25 mcg capsule, Take 0 25 mcg by mouth daily (Patient not taking: Reported on 1/17/2022 ), Disp: , Rfl:     cholecalciferol (VITAMIN D3) 1,000 units tablet, Take 2,000 Units by mouth daily, Disp: , Rfl:     cholestyramine sugar free (QUESTRAN LIGHT) 4 g packet, Take 1 packet (4 g total) by mouth 2 (two) times a day, Disp: 60 packet, Rfl: 0    clopidogrel (PLAVIX) 75 mg tablet, Take 75 mg by mouth daily, Disp: , Rfl:     docusate sodium (COLACE) 100 mg capsule, Take 100 mg by mouth 2 (two) times a day, Disp: , Rfl:     esomeprazole (NexIUM) 40 MG capsule, Take 40 mg by mouth every morning before breakfast, Disp: , Rfl:     gentamicin (GARAMYCIN) 0 1 % cream, Apply 1 application topically daily, Disp: 15 g, Rfl: 0    glucagon (GLUCAGEN) 1 mg injection, Inject 1 mg into a muscle once As needed for low blood sugar, Disp: , Rfl:     insulin glargine (LANTUS) 100 units/mL subcutaneous injection, Inject 5 Units under the skin daily at bedtime, Disp: , Rfl:     insulin lispro (HumaLOG) 100 units/mL injection, Inject under the skin Sliding scale coverage  , Disp: , Rfl:     latanoprost (XALATAN) 0 005 % ophthalmic solution, Administer 1 drop to both eyes daily at bedtime, Disp: , Rfl:     metoprolol tartrate (LOPRESSOR) 25 mg tablet, Take 25 mg by mouth daily, Disp: , Rfl:     midodrine (PROAMATINE) 5 mg tablet, Take 5 mg by mouth 3 (three) times a week 1 tablet Daily on Tue,Thus,Sat prior to HD, Disp: , Rfl:     Nutritional Supplements (Alon) POWD, Take by mouth, Disp: , Rfl:     nystatin (MYCOSTATIN) cream, Apply 1 application topically daily To buttocks (Patient not taking: Reported on 1/17/2022 ), Disp: , Rfl:     ondansetron (ZOFRAN) 4 mg tablet, Take 4 mg by mouth every 6 (six) hours as needed for nausea or vomiting, Disp: , Rfl:     perphenazine 4 mg tablet, Take 4 mg by mouth 3 (three) times a day With meals, Disp: , Rfl:     sacubitril-valsartan (ENTRESTO) 24-26 MG TABS, Take 1 tablet by mouth 2 (two) times a day Hold morning of dialysis days, Disp: 60 tablet, Rfl: 0    sertraline (ZOLOFT) 25 mg tablet, Take 25 mg by mouth daily, Disp: , Rfl:     Sevelamer Carbonate 0 8 g PACK, Take 1 Package by mouth 3 (three) times a day 1 Packet with meals TID, Disp: , Rfl:   History reviewed  No pertinent family history  Coordination of Care: Wound team aware of the treatment plan  Facility nurse will provide treatment and monitor the wound  Patient / Staff education :  Staff was given education on sign of infection and pressure ulcer prevention  Staff verbalized understanding     Follow up :  Return sign off  Voice-recognition software may have been used in the preparation of this document   Occasional wrong word or "sound-alike" substitutions may have occurred due to the inherent limitations of voice recognition software  Interpretation should be guided by context        SANTINO Schulz

## 2022-03-04 NOTE — TELEPHONE ENCOUNTER
[phone] (2nd floor)   r/t death of patient (Hospice)

## 2022-03-07 VITALS
SYSTOLIC BLOOD PRESSURE: 113 MMHG | TEMPERATURE: 97.8 F | OXYGEN SATURATION: 99 % | HEART RATE: 82 BPM | WEIGHT: 113.2 LBS | RESPIRATION RATE: 20 BRPM | BODY MASS INDEX: 20.05 KG/M2 | DIASTOLIC BLOOD PRESSURE: 65 MMHG

## 2022-03-07 NOTE — ASSESSMENT & PLAN NOTE
· As evidenced on CT head on 12/7/2021 revealing left cerebellar subacute CVA verses chronic infarct   · Patient unable to get MRI due to [facility] pacemaker placed in 2017   · Continue aspirin Plavix and statin   · Patient is awake alert able to make needs known is fully dependent for care and requires a mechanical lift out of bed 0  · Outpatient follow-up with Neurology

## 2022-03-07 NOTE — PROGRESS NOTES
[facility]  [address]  [phone]  [facility]  Code 32      NAME: Chapo Waller  AGE: 73 y o  SEX: female CODE STATUS: DNR    DATE OF ENCOUNTER: 2/25/2022    Assessment and Plan     1  Type 2 diabetes mellitus with hyperosmolarity without coma, with long-term current use of insulin (HCC)  Assessment & Plan:    Lab Results   Component Value Date    HGBA1C 10 3 (H) 12/06/2021     · Per geriatric guidelines, goal HgA1c is > 7 5 to prevent hypoglycemic event in the older adult with cognitive decline  · Needs better A1c Control   · Recently hospitalized with HHNK  · Fair meal completion   · Continue Lantus at 5 units Q HS   · Continue SSI with Accuchecks   · BS Goal 140-180  · Currently ranging 120-280 with one BS reading at 71 in past 2 weeks - improved   · Recheck HgbA1c in 3 months ( around 3/7/2022)        2  Cerebrovascular accident (CVA), unspecified mechanism ([address] )  Assessment & Plan:  · As evidenced on CT head on 12/7/2021 revealing left cerebellar subacute CVA verses chronic infarct   · Patient unable to get MRI due to [facility] pacemaker placed in 2017   · Continue aspirin Plavix and statin   · Patient is awake alert able to make needs known is fully dependent for care and requires a mechanical lift out of bed 0  · Outpatient follow-up with Neurology      3  Ischemic cardiomyopathy  Assessment & Plan:  · Daily weights, low salt diet  · Entresto on non HD days  · Monitor for fluid overload  · Appears Euvolemic on exam   · Per review of records- patient refuses weights at times       4   ESRD (end stage renal disease) on dialysis [facility])  Assessment & Plan:    Lab Results   Component Value Date    EGFR 12 01/22/2022    EGFR 18 01/21/2022    EGFR 9 01/20/2022    CREATININE 3 53 (H) 01/22/2022    CREATININE 2 55 (H) 01/21/2022    CREATININE 4 47 (H) 01/20/2022     · Previously on PD but required transition to HD during recent hospitalization   · Left Perma Cath placed  · Site clean and dry  · Continue HD on Tue/Thus/Sat  · Hold Entresto on Dialysis days   · Outpatient f/u with Nephrology      Patient expressed she did not want to do HD anymore, She is AAOx1 on exam, will need to discuss with family- Hospice Consult          All medications and routine orders were reviewed and updated as needed  Chief Complaint     STR follow up visit    History of Present Illness     Marga Bone is a 73year old female initially admitted to [facility] on 12/22/2021 for STR, she is a LTC resudnt at [place]  Past Medical Hx including but not limited to ESRD of PD with recent transition to HD, CVA, Pacemaker, DM II, HTN, Depression, Ambulatory dysfunction seen in collaboration with nursing for medical mgmt and follow up visit  Presented to [facility]   on 12/4/2021 with c/o Altered Mental Status  She was found to be in Hyperglycemic Hyperosmolar Nonketotic coma and was admitted with encephalopathy  She was managed with IV insulin gtt  Her mentation did not improve with glucose correction and Neuro workup revealed CT head with cerebellar CVA acute on chronic MRI could not be performed due to pacemaker  Due to being unable to find placement that would accommodate Peritoneal dialysis she was transitioned over to HD  Patient was consistently hypotensive while inpatient and her amlodipine and furosemide were discontinued  Patient also developed frequent hypoglycemic episodes and her Lantus was discontinued- she was discharged to rehab on SSI only  Rapid Response alerted on 12/17/21 due to unresponsiveness -unsure the cause but thought to be secondary to dialysis disequilibrium syndrome  Patient's mental status improved and she was deemed stable for d/c to rehab  Patient is a poor historian due to cognitive impairment from prior cva and language barrier  Georgian interrpertation done with nurses aide today  Patient is found resting in bed, she is propped up on pillows, Her legs and arms are contracted  Patient is awake and alert  Patient states she feels "bad" she states her "bottom" hurts  Patient is AAO x self only, She told aide she is 82years old and she thought she was at home and did not know the year  Patient did state she does not want to do HD anymore  Family made aware and we will consult hospice  Per review of SNF records, Patient is eating 1-2 meals per day, consuming 25-50%  Patient is incontinent of bowel and bladder, LBM 2/25/2022  Patient is dependent for care, requires full mechanical lift for transfers oob  No concerns from nursing at this time  The patient's allergies, past medical, surgical, social and family history were reviewed and unchanged  Review of Systems     Review of Systems   Constitutional: Positive for fatigue  Negative for chills and fever  HENT: Negative for congestion, rhinorrhea and sore throat  Eyes: Negative for pain and visual disturbance  Respiratory: Negative for cough, shortness of breath and wheezing  Cardiovascular: Negative for chest pain and palpitations  Gastrointestinal: Negative for abdominal pain, constipation, diarrhea and nausea  Genitourinary: Negative for decreased urine volume, difficulty urinating, dysuria and hematuria  Musculoskeletal: Positive for myalgias  Negative for arthralgias and back pain  Skin: Negative for color change and rash  Neurological: Positive for weakness  Negative for dizziness, syncope, numbness and headaches  Psychiatric/Behavioral: Negative for dysphoric mood and sleep disturbance  The patient is not nervous/anxious  Objective     Vitals:   Vitals:    02/25/22 0643   BP: 113/65   Pulse: 82   Resp: 20   Temp: 97 8 °F (36 6 °C)   SpO2: 99%         Physical Exam  Vitals and nursing note reviewed  Constitutional:       General: She is not in acute distress  Appearance: She is obese  She is ill-appearing  HENT:      Head: Normocephalic and atraumatic        Nose: No congestion or rhinorrhea  Mouth/Throat:      Mouth: Mucous membranes are dry  Eyes:      General: No scleral icterus  Conjunctiva/sclera: Conjunctivae normal       Pupils: Pupils are equal, round, and reactive to light  Cardiovascular:      Rate and Rhythm: Normal rate and regular rhythm  Pulses: Normal pulses  Heart sounds: Normal heart sounds  No murmur heard  Pulmonary:      Effort: Pulmonary effort is normal  No respiratory distress  Breath sounds: Normal breath sounds  No wheezing, rhonchi or rales  Comments: Diminished - poor effort   Abdominal:      General: Bowel sounds are normal  There is no distension  Palpations: Abdomen is soft  There is no mass  Tenderness: There is no abdominal tenderness  Hernia: No hernia is present  Musculoskeletal:         General: No swelling or tenderness  Lymphadenopathy:      Cervical: No cervical adenopathy  Skin:     General: Skin is warm and dry  Coloration: Skin is not pale  Findings: No rash  Comments: Left subclavian Perma Cath in place   Neurological:      General: No focal deficit present  Mental Status: She is alert and easily aroused  Mental status is at baseline  She is disoriented  Motor: Weakness present  Gait: Gait abnormal       Comments: AAOx1- Greek speaking- able to make needs known- good eye contact -follows commands    Psychiatric:         Mood and Affect: Mood normal          Behavior: Behavior normal          Pertinent Laboratory/Diagnostic Studies:   Reviewed in facility chart-stable    Current Medications   Medications reviewed and updated see facility [facility] for details        Current Outpatient Medications:     Acetaminophen 325 MG CAPS, Take 650 mg by mouth every 6 (six) hours as needed, Disp: , Rfl:     aspirin (ECOTRIN LOW STRENGTH) 81 mg EC tablet, Take 81 mg by mouth daily, Disp: , Rfl:     atorvastatin (LIPITOR) 10 mg tablet, Take 1 tablet (10 mg total) by mouth daily, Disp: 30 tablet, Rfl: 0    B Complex-C-Folic Acid (NEPHRO VITAMINS PO), Take 1 tablet by mouth daily, Disp: , Rfl:     calcitriol (ROCALTROL) 0 25 mcg capsule, Take 0 25 mcg by mouth daily (Patient not taking: Reported on 1/17/2022 ), Disp: , Rfl:     cholecalciferol (VITAMIN D3) 1,000 units tablet, Take 2,000 Units by mouth daily, Disp: , Rfl:     cholestyramine sugar free (QUESTRAN LIGHT) 4 g packet, Take 1 packet (4 g total) by mouth 2 (two) times a day, Disp: 60 packet, Rfl: 0    clopidogrel (PLAVIX) 75 mg tablet, Take 75 mg by mouth daily, Disp: , Rfl:     docusate sodium (COLACE) 100 mg capsule, Take 100 mg by mouth 2 (two) times a day, Disp: , Rfl:     esomeprazole (NexIUM) 40 MG capsule, Take 40 mg by mouth every morning before breakfast, Disp: , Rfl:     gentamicin (GARAMYCIN) 0 1 % cream, Apply 1 application topically daily, Disp: 15 g, Rfl: 0    glucagon (GLUCAGEN) 1 mg injection, Inject 1 mg into a muscle once As needed for low blood sugar, Disp: , Rfl:     insulin glargine (LANTUS) 100 units/mL subcutaneous injection, Inject 5 Units under the skin daily at bedtime, Disp: , Rfl:     insulin lispro (HumaLOG) 100 units/mL injection, Inject under the skin Sliding scale coverage  , Disp: , Rfl:     latanoprost (XALATAN) 0 005 % ophthalmic solution, Administer 1 drop to both eyes daily at bedtime, Disp: , Rfl:     metoprolol tartrate (LOPRESSOR) 25 mg tablet, Take 25 mg by mouth daily, Disp: , Rfl:     midodrine (PROAMATINE) 5 mg tablet, Take 5 mg by mouth 3 (three) times a week 1 tablet Daily on Tue,Thus,Sat prior to HD, Disp: , Rfl:     Nutritional Supplements (Alon) POWD, Take by mouth, Disp: , Rfl:     nystatin (MYCOSTATIN) cream, Apply 1 application topically daily To buttocks (Patient not taking: Reported on 1/17/2022 ), Disp: , Rfl:     ondansetron (ZOFRAN) 4 mg tablet, Take 4 mg by mouth every 6 (six) hours as needed for nausea or vomiting, Disp: , Rfl:     perphenazine 4 mg tablet, Take 4 mg by mouth 3 (three) times a day With meals, Disp: , Rfl:     sacubitril-valsartan (ENTRESTO) 24-26 MG TABS, Take 1 tablet by mouth 2 (two) times a day Hold morning of dialysis days, Disp: 60 tablet, Rfl: 0    sertraline (ZOLOFT) 25 mg tablet, Take 25 mg by mouth daily, Disp: , Rfl:     Sevelamer Carbonate 0 8 g PACK, Take 1 Package by mouth 3 (three) times a day 1 Packet with meals TID, Disp: , Rfl:      Plan discussed with Dr Tere Halsted Dr Tere Halsted noted agreement with assessment and plan  Please note:  Voice-recognition software may have been used in the preparation of this document  Occasional wrong word or "sound-alike" substitutions may have occurred due to the inherent limitations of voice recognition software  Interpretation should be guided by SANTINO Ewing  2/25/2022

## 2022-03-07 NOTE — ASSESSMENT & PLAN NOTE
Lab Results   Component Value Date    EGFR 12 01/22/2022    EGFR 18 01/21/2022    EGFR 9 01/20/2022    CREATININE 3 53 (H) 01/22/2022    CREATININE 2 55 (H) 01/21/2022    CREATININE 4 47 (H) 01/20/2022     · Previously on PD but required transition to HD during recent hospitalization   · Left Perma Cath placed  · Site clean and dry  · Continue HD on Tue/Thus/Sat  · Hold Entresto on Dialysis days   · Outpatient f/u with Nephrology      Patient expressed she did not want to do HD anymore, She is AAOx1 on exam, will need to discuss with family- Hospice Consult

## 2023-11-28 NOTE — PLAN OF CARE
Problem: Potential for Falls  Goal: Patient will remain free of falls  Description: INTERVENTIONS:  - Educate patient/family on patient safety including physical limitations  - Instruct patient to call for assistance with activity   - Consult OT/PT to assist with strengthening/mobility   - Keep Call bell within reach  - Keep bed low and locked with side rails adjusted as appropriate  - Keep care items and personal belongings within reach  - Initiate and maintain comfort rounds  - Make Fall Risk Sign visible to staff  - Offer Toileting every 2 Hours, in advance of need  - Initiate/Maintain bed/chair alarm  - Obtain necessary fall risk management equipment: bed alarm  - Apply yellow socks and bracelet for high fall risk patients  - Consider moving patient to room near nurses station  Outcome: Progressing     Problem: Prexisting or High Potential for Compromised Skin Integrity  Goal: Skin integrity is maintained or improved  Description: INTERVENTIONS:  - Identify patients at risk for skin breakdown  - Assess and monitor skin integrity  - Assess and monitor nutrition and hydration status  - Monitor labs   - Assess for incontinence   - Turn and reposition patient  - Assist with mobility/ambulation  - Relieve pressure over bony prominences  - Avoid friction and shearing  - Provide appropriate hygiene as needed including keeping skin clean and dry  - Evaluate need for skin moisturizer/barrier cream  - Collaborate with interdisciplinary team   - Patient/family teaching  - Consider wound care consult   Outcome: Progressing     Problem: MOBILITY - ADULT  Goal: Maintain or return to baseline ADL function  Description: INTERVENTIONS:  -  Assess patient's ability to carry out ADLs; assess patient's baseline for ADL function and identify physical deficits which impact ability to perform ADLs (bathing, care of mouth/teeth, toileting, grooming, dressing, etc )  - Assess/evaluate cause of self-care deficits   - Assess range of Routine referral letter sent via [address] [place] [address]. If no response is received by 12/13/2023, a f/u call to patient will be made. motion  - Assess patient's mobility; develop plan if impaired  - Assess patient's need for assistive devices and provide as appropriate  - Encourage maximum independence but intervene and supervise when necessary  - Involve family in performance of ADLs  - Assess for home care needs following discharge   - Consider OT consult to assist with ADL evaluation and planning for discharge  - Provide patient education as appropriate  Outcome: Progressing  Goal: Maintains/Returns to pre admission functional level  Description: INTERVENTIONS:  - Perform BMAT or MOVE assessment daily    - Set and communicate daily mobility goal to care team and patient/family/caregiver  - Collaborate with rehabilitation services on mobility goals if consulted  - Perform Range of Motion 2 times a day  - Reposition patient every 2 hours  - Dangle patient 2 times a day  - Stand patient 2 times a day  - Ambulate patient 2 times a day  - Out of bed to chair 2 times a day   - Out of bed for meals 2 times a day  - Out of bed for toileting  - Record patient progress and toleration of activity level   Outcome: Progressing     Problem: Nutrition/Hydration-ADULT  Goal: Nutrient/Hydration intake appropriate for improving, restoring or maintaining nutritional needs  Description: Monitor and assess patient's nutrition/hydration status for malnutrition  Collaborate with interdisciplinary team and initiate plan and interventions as ordered  Monitor patient's weight and dietary intake as ordered or per policy  Utilize nutrition screening tool and intervene as necessary  Determine patient's food preferences and provide high-protein, high-caloric foods as appropriate       INTERVENTIONS:  - Monitor oral intake, urinary output, labs, and treatment plans  - Assess nutrition and hydration status and recommend course of action  - Evaluate amount of meals eaten  - Assist patient with eating if necessary   - Allow adequate time for meals  - Recommend/ encourage appropriate diets, oral nutritional supplements, and vitamin/mineral supplements  - Order, calculate, and assess calorie counts as needed  - Recommend, monitor, and adjust tube feedings and TPN/PPN based on assessed needs  - Assess need for intravenous fluids  - Provide specific nutrition/hydration education as appropriate  - Include patient/family/caregiver in decisions related to nutrition  Outcome: Progressing

## 2025-05-23 NOTE — ASSESSMENT & PLAN NOTE
· Controlled on exam   · PRN Midodrine prior to HD due to hypotension  · Continue Metoprolol and Entresto on non HD days English